# Patient Record
Sex: FEMALE | HISPANIC OR LATINO | Employment: UNEMPLOYED | URBAN - METROPOLITAN AREA
[De-identification: names, ages, dates, MRNs, and addresses within clinical notes are randomized per-mention and may not be internally consistent; named-entity substitution may affect disease eponyms.]

---

## 2019-06-28 ENCOUNTER — HOSPITAL ENCOUNTER (OUTPATIENT)
Facility: MEDICAL CENTER | Age: 44
End: 2019-06-29
Attending: EMERGENCY MEDICINE | Admitting: INTERNAL MEDICINE
Payer: COMMERCIAL

## 2019-06-28 ENCOUNTER — APPOINTMENT (OUTPATIENT)
Dept: RADIOLOGY | Facility: MEDICAL CENTER | Age: 44
End: 2019-06-28
Attending: EMERGENCY MEDICINE
Payer: COMMERCIAL

## 2019-06-28 DIAGNOSIS — D75.1 SECONDARY POLYCYTHEMIA: ICD-10-CM

## 2019-06-28 DIAGNOSIS — J96.01 ACUTE RESPIRATORY FAILURE WITH HYPOXIA (HCC): ICD-10-CM

## 2019-06-28 DIAGNOSIS — J45.41 MODERATE PERSISTENT ASTHMA WITH ACUTE EXACERBATION: ICD-10-CM

## 2019-06-28 DIAGNOSIS — R09.02 HYPOXIA: ICD-10-CM

## 2019-06-28 DIAGNOSIS — R06.02 SOB (SHORTNESS OF BREATH): ICD-10-CM

## 2019-06-28 PROBLEM — D45 POLYCYTHEMIA VERA (HCC): Status: ACTIVE | Noted: 2019-06-28

## 2019-06-28 LAB
ALBUMIN SERPL BCP-MCNC: 3.7 G/DL (ref 3.2–4.9)
ALBUMIN/GLOB SERPL: 1.2 G/DL
ALP SERPL-CCNC: 72 U/L (ref 30–99)
ALT SERPL-CCNC: 11 U/L (ref 2–50)
ANION GAP SERPL CALC-SCNC: 10 MMOL/L (ref 0–11.9)
ANISOCYTOSIS BLD QL SMEAR: ABNORMAL
AST SERPL-CCNC: 21 U/L (ref 12–45)
BASOPHILS # BLD AUTO: 1 % (ref 0–1.8)
BASOPHILS # BLD: 0.09 K/UL (ref 0–0.12)
BILIRUB SERPL-MCNC: 1 MG/DL (ref 0.1–1.5)
BNP SERPL-MCNC: 9 PG/ML (ref 0–100)
BUN SERPL-MCNC: 16 MG/DL (ref 8–22)
CALCIUM SERPL-MCNC: 9.3 MG/DL (ref 8.5–10.5)
CHLORIDE SERPL-SCNC: 97 MMOL/L (ref 96–112)
CO2 SERPL-SCNC: 31 MMOL/L (ref 20–33)
COMMENT 1642: NORMAL
CREAT SERPL-MCNC: 0.64 MG/DL (ref 0.5–1.4)
EKG IMPRESSION: NORMAL
EOSINOPHIL # BLD AUTO: 0.17 K/UL (ref 0–0.51)
EOSINOPHIL NFR BLD: 1.9 % (ref 0–6.9)
ERYTHROCYTE [DISTWIDTH] IN BLOOD BY AUTOMATED COUNT: 64.8 FL (ref 35.9–50)
GLOBULIN SER CALC-MCNC: 3.2 G/DL (ref 1.9–3.5)
GLUCOSE SERPL-MCNC: 126 MG/DL (ref 65–99)
HCT VFR BLD AUTO: 69.6 % (ref 37–47)
HGB BLD-MCNC: 19.6 G/DL (ref 12–16)
IMM GRANULOCYTES # BLD AUTO: 0.01 K/UL (ref 0–0.11)
IMM GRANULOCYTES NFR BLD AUTO: 0.1 % (ref 0–0.9)
LYMPHOCYTES # BLD AUTO: 1.85 K/UL (ref 1–4.8)
LYMPHOCYTES NFR BLD: 20.6 % (ref 22–41)
MACROCYTES BLD QL SMEAR: ABNORMAL
MCH RBC QN AUTO: 25.1 PG (ref 27–33)
MCHC RBC AUTO-ENTMCNC: 29.7 G/DL (ref 33.6–35)
MCV RBC AUTO: 85.7 FL (ref 81.4–97.8)
MICROCYTES BLD QL SMEAR: ABNORMAL
MONOCYTES # BLD AUTO: 0.75 K/UL (ref 0–0.85)
MONOCYTES NFR BLD AUTO: 8.4 % (ref 0–13.4)
MORPHOLOGY BLD-IMP: NORMAL
NEUTROPHILS # BLD AUTO: 6.1 K/UL (ref 2–7.15)
NEUTROPHILS NFR BLD: 68 % (ref 44–72)
NRBC # BLD AUTO: 0 K/UL
NRBC BLD-RTO: 0 /100 WBC
PLATELET # BLD AUTO: 258 K/UL (ref 164–446)
PLATELET BLD QL SMEAR: NORMAL
PMV BLD AUTO: 10.3 FL (ref 9–12.9)
POTASSIUM SERPL-SCNC: 4.4 MMOL/L (ref 3.6–5.5)
PROT SERPL-MCNC: 6.9 G/DL (ref 6–8.2)
RBC # BLD AUTO: 8.09 M/UL (ref 4.2–5.4)
RBC BLD AUTO: PRESENT
SODIUM SERPL-SCNC: 138 MMOL/L (ref 135–145)
TROPONIN I SERPL-MCNC: <0.01 NG/ML (ref 0–0.04)
WBC # BLD AUTO: 9 K/UL (ref 4.8–10.8)

## 2019-06-28 PROCEDURE — 700101 HCHG RX REV CODE 250: Performed by: EMERGENCY MEDICINE

## 2019-06-28 PROCEDURE — G0378 HOSPITAL OBSERVATION PER HR: HCPCS

## 2019-06-28 PROCEDURE — 80053 COMPREHEN METABOLIC PANEL: CPT

## 2019-06-28 PROCEDURE — 94640 AIRWAY INHALATION TREATMENT: CPT

## 2019-06-28 PROCEDURE — 83880 ASSAY OF NATRIURETIC PEPTIDE: CPT

## 2019-06-28 PROCEDURE — 93005 ELECTROCARDIOGRAM TRACING: CPT | Performed by: EMERGENCY MEDICINE

## 2019-06-28 PROCEDURE — 84484 ASSAY OF TROPONIN QUANT: CPT

## 2019-06-28 PROCEDURE — 99285 EMERGENCY DEPT VISIT HI MDM: CPT

## 2019-06-28 PROCEDURE — 99220 PR INITIAL OBSERVATION CARE,LEVL III: CPT | Performed by: INTERNAL MEDICINE

## 2019-06-28 PROCEDURE — 304561 HCHG STAT O2

## 2019-06-28 PROCEDURE — 85379 FIBRIN DEGRADATION QUANT: CPT

## 2019-06-28 PROCEDURE — 71045 X-RAY EXAM CHEST 1 VIEW: CPT

## 2019-06-28 PROCEDURE — 94760 N-INVAS EAR/PLS OXIMETRY 1: CPT

## 2019-06-28 PROCEDURE — 85025 COMPLETE CBC W/AUTO DIFF WBC: CPT

## 2019-06-28 RX ORDER — FUROSEMIDE 20 MG/1
20 TABLET ORAL DAILY
COMMUNITY
End: 2019-06-29

## 2019-06-28 RX ORDER — POLYETHYLENE GLYCOL 3350 17 G/17G
1 POWDER, FOR SOLUTION ORAL
Status: DISCONTINUED | OUTPATIENT
Start: 2019-06-28 | End: 2019-06-29 | Stop reason: HOSPADM

## 2019-06-28 RX ORDER — ALBUTEROL SULFATE 90 UG/1
2 AEROSOL, METERED RESPIRATORY (INHALATION) EVERY 6 HOURS PRN
Status: DISCONTINUED | OUTPATIENT
Start: 2019-06-28 | End: 2019-06-29 | Stop reason: HOSPADM

## 2019-06-28 RX ORDER — FELODIPINE 10 MG/1
10 TABLET, EXTENDED RELEASE ORAL
Status: DISCONTINUED | OUTPATIENT
Start: 2019-06-29 | End: 2019-06-29 | Stop reason: HOSPADM

## 2019-06-28 RX ORDER — BISACODYL 10 MG
10 SUPPOSITORY, RECTAL RECTAL
Status: DISCONTINUED | OUTPATIENT
Start: 2019-06-28 | End: 2019-06-29 | Stop reason: HOSPADM

## 2019-06-28 RX ORDER — ONDANSETRON 2 MG/ML
4 INJECTION INTRAMUSCULAR; INTRAVENOUS EVERY 4 HOURS PRN
Status: DISCONTINUED | OUTPATIENT
Start: 2019-06-28 | End: 2019-06-29 | Stop reason: HOSPADM

## 2019-06-28 RX ORDER — METOPROLOL SUCCINATE 50 MG/1
100 TABLET, EXTENDED RELEASE ORAL
Status: DISCONTINUED | OUTPATIENT
Start: 2019-06-29 | End: 2019-06-29 | Stop reason: HOSPADM

## 2019-06-28 RX ORDER — ONDANSETRON 4 MG/1
4 TABLET, ORALLY DISINTEGRATING ORAL EVERY 4 HOURS PRN
Status: DISCONTINUED | OUTPATIENT
Start: 2019-06-28 | End: 2019-06-29 | Stop reason: HOSPADM

## 2019-06-28 RX ORDER — ACETAMINOPHEN 325 MG/1
650 TABLET ORAL EVERY 6 HOURS PRN
Status: DISCONTINUED | OUTPATIENT
Start: 2019-06-28 | End: 2019-06-29 | Stop reason: HOSPADM

## 2019-06-28 RX ORDER — POTASSIUM CHLORIDE 1.5 G/1.58G
20 POWDER, FOR SOLUTION ORAL 2 TIMES DAILY
COMMUNITY
End: 2019-06-29

## 2019-06-28 RX ORDER — AMOXICILLIN 250 MG
2 CAPSULE ORAL 2 TIMES DAILY
Status: DISCONTINUED | OUTPATIENT
Start: 2019-06-29 | End: 2019-06-29 | Stop reason: HOSPADM

## 2019-06-28 RX ORDER — LISINOPRIL 20 MG/1
20 TABLET ORAL
Status: DISCONTINUED | OUTPATIENT
Start: 2019-06-29 | End: 2019-06-29 | Stop reason: HOSPADM

## 2019-06-28 RX ADMIN — ALBUTEROL SULFATE 2.5 MG: 2.5 SOLUTION RESPIRATORY (INHALATION) at 22:31

## 2019-06-28 ASSESSMENT — COPD QUESTIONNAIRES
HAVE YOU SMOKED AT LEAST 100 CIGARETTES IN YOUR ENTIRE LIFE: YES
COPD SCREENING SCORE: 6
DURING THE PAST 4 WEEKS HOW MUCH DID YOU FEEL SHORT OF BREATH: MOST  OR ALL OF THE TIME
DO YOU EVER COUGH UP ANY MUCUS OR PHLEGM?: YES, A FEW DAYS A WEEK OR MONTH

## 2019-06-28 ASSESSMENT — LIFESTYLE VARIABLES: EVER_SMOKED: YES

## 2019-06-29 ENCOUNTER — PATIENT OUTREACH (OUTPATIENT)
Dept: HEALTH INFORMATION MANAGEMENT | Facility: OTHER | Age: 44
End: 2019-06-29

## 2019-06-29 ENCOUNTER — APPOINTMENT (OUTPATIENT)
Dept: CARDIOLOGY | Facility: MEDICAL CENTER | Age: 44
End: 2019-06-29
Attending: INTERNAL MEDICINE
Payer: COMMERCIAL

## 2019-06-29 VITALS
WEIGHT: 292.99 LBS | RESPIRATION RATE: 20 BRPM | HEART RATE: 75 BPM | HEIGHT: 65 IN | SYSTOLIC BLOOD PRESSURE: 144 MMHG | OXYGEN SATURATION: 98 % | BODY MASS INDEX: 48.82 KG/M2 | TEMPERATURE: 96.5 F | DIASTOLIC BLOOD PRESSURE: 96 MMHG

## 2019-06-29 PROBLEM — J45.41 MODERATE PERSISTENT ASTHMA WITH ACUTE EXACERBATION: Status: ACTIVE | Noted: 2019-06-29

## 2019-06-29 PROBLEM — I10 ESSENTIAL HYPERTENSION: Status: ACTIVE | Noted: 2019-06-29

## 2019-06-29 PROBLEM — E66.9 OBESITY: Status: ACTIVE | Noted: 2019-06-29

## 2019-06-29 LAB
ANION GAP SERPL CALC-SCNC: 17 MMOL/L (ref 0–11.9)
BASOPHILS # BLD AUTO: 1.1 % (ref 0–1.8)
BASOPHILS # BLD: 0.07 K/UL (ref 0–0.12)
BUN SERPL-MCNC: 14 MG/DL (ref 8–22)
CALCIUM SERPL-MCNC: 9.2 MG/DL (ref 8.5–10.5)
CHLORIDE SERPL-SCNC: 97 MMOL/L (ref 96–112)
CO2 SERPL-SCNC: 25 MMOL/L (ref 20–33)
CREAT SERPL-MCNC: 0.6 MG/DL (ref 0.5–1.4)
D DIMER PPP IA.FEU-MCNC: <0.4 UG/ML (FEU) (ref 0–0.5)
EOSINOPHIL # BLD AUTO: 0.14 K/UL (ref 0–0.51)
EOSINOPHIL NFR BLD: 2.1 % (ref 0–6.9)
ERYTHROCYTE [DISTWIDTH] IN BLOOD BY AUTOMATED COUNT: 68.3 FL (ref 35.9–50)
GLUCOSE SERPL-MCNC: 100 MG/DL (ref 65–99)
HCT VFR BLD AUTO: 70.1 % (ref 37–47)
HGB BLD-MCNC: 20.2 G/DL (ref 12–16)
IMM GRANULOCYTES # BLD AUTO: 0.01 K/UL (ref 0–0.11)
IMM GRANULOCYTES NFR BLD AUTO: 0.2 % (ref 0–0.9)
LV EJECT FRACT  99904: 65
LV EJECT FRACT MOD 2C 99903: 66.82
LV EJECT FRACT MOD 4C 99902: 67.8
LV EJECT FRACT MOD BP 99901: 66.49
LYMPHOCYTES # BLD AUTO: 1.42 K/UL (ref 1–4.8)
LYMPHOCYTES NFR BLD: 21.4 % (ref 22–41)
MCH RBC QN AUTO: 25 PG (ref 27–33)
MCHC RBC AUTO-ENTMCNC: 28.8 G/DL (ref 33.6–35)
MCV RBC AUTO: 86.8 FL (ref 81.4–97.8)
MONOCYTES # BLD AUTO: 0.58 K/UL (ref 0–0.85)
MONOCYTES NFR BLD AUTO: 8.7 % (ref 0–13.4)
NEUTROPHILS # BLD AUTO: 4.42 K/UL (ref 2–7.15)
NEUTROPHILS NFR BLD: 66.5 % (ref 44–72)
NRBC # BLD AUTO: 0 K/UL
NRBC BLD-RTO: 0 /100 WBC
PLATELET # BLD AUTO: 230 K/UL (ref 164–446)
PMV BLD AUTO: 9.6 FL (ref 9–12.9)
POTASSIUM SERPL-SCNC: 4.2 MMOL/L (ref 3.6–5.5)
PROCALCITONIN SERPL-MCNC: <0.05 NG/ML
RBC # BLD AUTO: 8.08 M/UL (ref 4.2–5.4)
SODIUM SERPL-SCNC: 139 MMOL/L (ref 135–145)
WBC # BLD AUTO: 6.6 K/UL (ref 4.8–10.8)

## 2019-06-29 PROCEDURE — 84145 PROCALCITONIN (PCT): CPT

## 2019-06-29 PROCEDURE — 93306 TTE W/DOPPLER COMPLETE: CPT | Mod: 26 | Performed by: INTERNAL MEDICINE

## 2019-06-29 PROCEDURE — G0378 HOSPITAL OBSERVATION PER HR: HCPCS

## 2019-06-29 PROCEDURE — 700102 HCHG RX REV CODE 250 W/ 637 OVERRIDE(OP): Performed by: INTERNAL MEDICINE

## 2019-06-29 PROCEDURE — 80048 BASIC METABOLIC PNL TOTAL CA: CPT

## 2019-06-29 PROCEDURE — 36415 COLL VENOUS BLD VENIPUNCTURE: CPT

## 2019-06-29 PROCEDURE — A9270 NON-COVERED ITEM OR SERVICE: HCPCS | Performed by: INTERNAL MEDICINE

## 2019-06-29 PROCEDURE — 99217 PR OBSERVATION CARE DISCHARGE: CPT | Performed by: INTERNAL MEDICINE

## 2019-06-29 PROCEDURE — 93306 TTE W/DOPPLER COMPLETE: CPT

## 2019-06-29 PROCEDURE — 85025 COMPLETE CBC W/AUTO DIFF WBC: CPT

## 2019-06-29 RX ORDER — LISINOPRIL 20 MG/1
20 TABLET ORAL 2 TIMES DAILY
COMMUNITY
End: 2021-10-30

## 2019-06-29 RX ORDER — HYDROCHLOROTHIAZIDE 25 MG/1
25 TABLET ORAL DAILY
COMMUNITY
End: 2021-10-30

## 2019-06-29 RX ORDER — FELODIPINE 5 MG/1
5 TABLET, EXTENDED RELEASE ORAL DAILY
COMMUNITY
End: 2021-06-16

## 2019-06-29 RX ORDER — POTASSIUM CHLORIDE 20 MEQ/1
20 TABLET, EXTENDED RELEASE ORAL DAILY
COMMUNITY
End: 2021-10-31

## 2019-06-29 RX ORDER — ACETAMINOPHEN 325 MG/1
650 TABLET ORAL EVERY 6 HOURS PRN
Qty: 30 TAB | Refills: 0 | Status: SHIPPED | OUTPATIENT
Start: 2019-06-29 | End: 2021-10-30

## 2019-06-29 RX ORDER — IPRATROPIUM BROMIDE AND ALBUTEROL SULFATE 2.5; .5 MG/3ML; MG/3ML
3 SOLUTION RESPIRATORY (INHALATION) EVERY 4 HOURS PRN
Qty: 30 BULLET | Refills: 0 | Status: SHIPPED | OUTPATIENT
Start: 2019-06-29 | End: 2021-10-30

## 2019-06-29 RX ORDER — ALBUTEROL SULFATE 90 UG/1
2 AEROSOL, METERED RESPIRATORY (INHALATION) EVERY 6 HOURS PRN
Qty: 8.5 G | Refills: 1 | Status: SHIPPED | OUTPATIENT
Start: 2019-06-29 | End: 2023-01-11 | Stop reason: SDUPTHER

## 2019-06-29 RX ORDER — BUDESONIDE AND FORMOTEROL FUMARATE DIHYDRATE 80; 4.5 UG/1; UG/1
2 AEROSOL RESPIRATORY (INHALATION) 2 TIMES DAILY
Qty: 1 EACH | Refills: 2 | Status: SHIPPED | OUTPATIENT
Start: 2019-06-29 | End: 2021-06-16

## 2019-06-29 RX ADMIN — LISINOPRIL 20 MG: 20 TABLET ORAL at 04:49

## 2019-06-29 RX ADMIN — FELODIPINE 10 MG: 10 TABLET, EXTENDED RELEASE ORAL at 04:50

## 2019-06-29 RX ADMIN — ASPIRIN 81 MG: 81 TABLET, DELAYED RELEASE ORAL at 04:47

## 2019-06-29 RX ADMIN — SENNOSIDES AND DOCUSATE SODIUM 2 TABLET: 8.6; 5 TABLET ORAL at 04:49

## 2019-06-29 RX ADMIN — METOPROLOL SUCCINATE 100 MG: 50 TABLET, EXTENDED RELEASE ORAL at 01:41

## 2019-06-29 ASSESSMENT — COGNITIVE AND FUNCTIONAL STATUS - GENERAL
TURNING FROM BACK TO SIDE WHILE IN FLAT BAD: A LITTLE
DAILY ACTIVITIY SCORE: 24
SUGGESTED CMS G CODE MODIFIER DAILY ACTIVITY: CH
MOBILITY SCORE: 23
SUGGESTED CMS G CODE MODIFIER MOBILITY: CI

## 2019-06-29 ASSESSMENT — PATIENT HEALTH QUESTIONNAIRE - PHQ9
SUM OF ALL RESPONSES TO PHQ9 QUESTIONS 1 AND 2: 0
1. LITTLE INTEREST OR PLEASURE IN DOING THINGS: NOT AT ALL
2. FEELING DOWN, DEPRESSED, IRRITABLE, OR HOPELESS: NOT AT ALL

## 2019-06-29 ASSESSMENT — ENCOUNTER SYMPTOMS
NAUSEA: 0
MYALGIAS: 0
BLURRED VISION: 0
FOCAL WEAKNESS: 0
DIAPHORESIS: 0
FEVER: 0
DIARRHEA: 0
ABDOMINAL PAIN: 0
COUGH: 0
HEADACHES: 0
BLOOD IN STOOL: 0
PALPITATIONS: 0
WHEEZING: 0
CHILLS: 0
SPUTUM PRODUCTION: 0
NECK PAIN: 0
SHORTNESS OF BREATH: 1
DIZZINESS: 0
FLANK PAIN: 0
BACK PAIN: 0
SEIZURES: 0
BRUISES/BLEEDS EASILY: 0
VOMITING: 0
SORE THROAT: 0

## 2019-06-29 ASSESSMENT — LIFESTYLE VARIABLES
EVER_SMOKED: NEVER
ALCOHOL_USE: NO
EVER_SMOKED: NEVER

## 2019-06-29 NOTE — DISCHARGE PLANNING
Hospital Care Management Discharge Planning       Anticipated Discharge Disposition:   · Home w/ O2 DME resumption and update.     Action:   · This RN CM met with patient at bedside.   · Patient gets her NOC O2 through [place].   · This RN CM requested ELVIA Arriola to send updated DME order to [facility] and request bedside delivery for today.      Barriers to Discharge:   · DME delivery.     Plan:   · F/U with [facility] and treatment team.    · Continue to provide support services and assistance with discharge planning as needed.

## 2019-06-29 NOTE — DISCHARGE SUMMARY
Discharge Summary    CHIEF COMPLAINT ON ADMISSION  Chief Complaint   Patient presents with   • Sent by MD   • Abnormal Labs       Reason for Admission  Shortness of breath    Admission Date  6/28/2019    CODE STATUS  Full Code    HPI & HOSPITAL COURSE  This is a 43 y.o. female here with complaint of shortness of breath and noticed to have significant polycythemia.  Patient was also noticed to have COPD exacerbation.  Patient was treated with breathing treatment and bronchodilator.  Patient underlying asthma probably is the main reason for COPD.  Patient currently tolerated treatment of breathing condition significantly breathing treatment and bronchodilator.  Patient is recommended to be discharged home with use of oxygen to treat chronic hypoxemia and use of inhaled steroid for COPD.  Recommend patient follow-up with outpatient for polycythemia likely due to chronic hypoxia.    During patient's hospital stay patient's BMP was negative and patient d-dimer was negative.  No signs of PE.         Therefore, she is discharged in fair and stable condition to home with close outpatient follow-up.    The patient recovered much more quickly than anticipated on admission.    Discharge Date  6/29/2019    FOLLOW UP ITEMS POST DISCHARGE  none    DISCHARGE DIAGNOSES      Polycythemia vera (HCC) POA: Yes    Acute respiratory failure with hypoxia (HCC) POA: Yes    Obesity POA: Yes    Essential hypertension POA: Yes    Moderate persistent asthma with acute exacerbation POA: Yes    FOLLOW UP  No future appointments.  Deniz Murcia M.D.  [address] [facility] [address]  [phone]    In 1 week        MEDICATIONS ON DISCHARGE     Medication List      START taking these medications      Instructions   acetaminophen 325 MG Tabs  Commonly known as:  TYLENOL   Take 2 Tabs by mouth every 6 hours as needed (Mild Pain; (Pain scale 1-3); Temp greater than 100.5 F).  Dose:  650 mg     budesonide-formoterol 80-4.5 MCG/ACT Aero  Commonly  known as:  SYMBICORT   Inhale 2 Puffs by mouth 2 Times a Day.  Dose:  2 Puff     ipratropium-albuterol 0.5-2.5 (3) MG/3ML nebulizer solution  Commonly known as:  DUONEB   3 mL by Nebulization route every four hours as needed for Shortness of Breath.  Dose:  3 mL        CONTINUE taking these medications      Instructions   albuterol 108 (90 Base) MCG/ACT Aers inhalation aerosol   Inhale 2 Puffs by mouth every 6 hours as needed for Shortness of Breath.  Dose:  2 Puff     felodipine 5 MG Tb24  Commonly known as:  PLENDIL   Take 5 mg by mouth every day.  Dose:  5 mg     hydroCHLOROthiazide 25 MG Tabs  Commonly known as:  HYDRODIURIL   Take 25 mg by mouth every day.  Dose:  25 mg     lisinopril 20 MG Tabs  Commonly known as:  PRINIVIL   Take 20 mg by mouth 2 Times a Day.  Dose:  20 mg     metoprolol 25 MG Tabs  Commonly known as:  LOPRESSOR   Take 25 mg by mouth 2 times a day.  Dose:  25 mg     OMEGA 3 PO   Take 1 Dose by mouth every day. Unknown OTC Strength  Dose:  1 Dose     potassium chloride SA 20 MEQ Tbcr  Commonly known as:  Kdur   Take 20 mEq by mouth every day.  Dose:  20 mEq            Allergies  No Known Allergies    DIET  Orders Placed This Encounter   Procedures   • Diet Order Cardiac, Diabetic     Standing Status:   Standing     Number of Occurrences:   1     Order Specific Question:   Diet:     Answer:   Cardiac [6]     Order Specific Question:   Diet:     Answer:   Diabetic [3]       ACTIVITY  As tolerated.  Weight bearing as tolerated    CONSULTATIONS  none    PROCEDURES  None    DX-CHEST-PORTABLE (1 VIEW)   Final Result   Addendum 1 of 1   These findings were discussed with TRINITY SABA on 6/28/2019 10:51 PM.      Final      No acute cardiopulmonary disease.      EC-ECHOCARDIOGRAM COMPLETE W/O CONT    (Results Pending)     PE:  Gen: AAOx3, NAD  Eyes: PELLA  Neck: no JVD, no lymphadenopathy  Cardia: RRR, no mrg  Lungs: CTAB, no rales, rhonci or wheezing  Abd: NABS, soft, non extended, no mass  EXT:  Bilateral lower extremity symmetric 1+edema, peripheral pulse 2+ b/l  Neuro: CN II-XII intact, non focal, reflex 2+ symmetrical  Skin: Intact, no lesion, warm  Psych: Appropriate.      LABORATORY  Lab Results   Component Value Date    SODIUM 139 06/29/2019    POTASSIUM 4.2 06/29/2019    CHLORIDE 97 06/29/2019    CO2 25 06/29/2019    GLUCOSE 100 (H) 06/29/2019    BUN 14 06/29/2019    CREATININE 0.60 06/29/2019    CREATININE 0.6 05/28/2006        Lab Results   Component Value Date    WBC 6.6 06/29/2019    HEMOGLOBIN 20.2 (H) 06/29/2019    HEMATOCRIT 70.1 (HH) 06/29/2019    PLATELETCT 230 06/29/2019        Total time of the discharge process exceeds 38 minutes.

## 2019-06-29 NOTE — DISCHARGE PLANNING
Hospital Care Management Discharge Planning       Anticipated Discharge Disposition:   · Home w/ O2 DME resumption and update.     Action:   · This RN BOB requested additional follow-up with [facility] regarding delivery.      Barriers to Discharge:   · DME delivery.     Plan:   · F/U with [facility] and treatment team.    · Continue to provide support services and assistance with discharge planning as needed.

## 2019-06-29 NOTE — ASSESSMENT & PLAN NOTE
Troponin and BNP within normal limits  Check d-dimer, if elevated will order CTA chest with IV contrast to rule out PE  Check 2D echo  Continuous cardiac monitoring  Continue supplemental oxygen with RT protocol

## 2019-06-29 NOTE — ED TRIAGE NOTES
Chief Complaint   Patient presents with   • Sent by MD   • Abnormal Labs   Pt to triage in NAD.  Pt was sent by the [facility] for abnormal labs.  Pt reports they told her that her hormones are all out of wack.  Pt is supposed to wear 2L home O2 but has not gotten portable tank but she states she does have home O2.  Pt educated on triage process and instructed to notify triage RN of any change in status.

## 2019-06-29 NOTE — PROGRESS NOTES
O2 delivered to bedside, pt and  aware of how to follow up for new equipment. D/c instructions reviewed with pt. Pt escorted to family car via WC by RN. All questions answered.

## 2019-06-29 NOTE — CARE PLAN
Problem: Infection  Goal: Will remain free from infection  Outcome: PROGRESSING AS EXPECTED  No s/sx infection    Problem: Knowledge Deficit  Goal: Knowledge of disease process/condition, treatment plan, diagnostic tests, and medications will improve  Outcome: PROGRESSING SLOWER THAN EXPECTED  Pt states she has questions about diagnosis, want to talk with Md today. Will let them know

## 2019-06-29 NOTE — RESPIRATORY CARE
COPD EDUCATION by COPD CLINICAL EDUCATOR  6/29/2019 at 7:12 AM by Domonique Fajardo     Patient reviewed by COPD education team. Patient does not have a history or diagnosis of COPD and is a non-smoker, therefore does not qualify for the COPD program.

## 2019-06-29 NOTE — PROGRESS NOTES
Critical lab result called hct 70.1, read back and confirmed. Dr Contreras and bedside and made aware. No further interventions at this time.

## 2019-06-29 NOTE — PROGRESS NOTES
Lab called RN to clarify therapeutic phlebotomy that was ordered overnight but was not done. I had let Dr Contreras know when he was talking about discharge and he said it was fine it was not done. Cancelled with lab.

## 2019-06-29 NOTE — PROGRESS NOTES
Patient transfer from [place] [address] ED to 816.  Patient accompanied with significant other.  Patient on 3L via NC.  Denies pain.  No distress noted at this time.  Patient placed on monitor. SR.  Patient and significant other informed and educated of unit and floor protocol.  Will continue to monitor.

## 2019-06-29 NOTE — PROGRESS NOTES
RN acknowledges discharge order and echo complete. Working with CM to get home O2 delivered to bedside before d/c.

## 2019-06-29 NOTE — ASSESSMENT & PLAN NOTE
Body mass index is 48.76 kg/m².   Recommended outpatient weight management program and bariatric surgery evaluation

## 2019-06-29 NOTE — ED NOTES
"Pt here because of MD called her to come to the ED about lab results. Pt states \" I feel fine.\" SPO2 found to be 80%. Pt states she is supposed to be on O2 but was not wearing it at this time. SPO2 came up with 3L O2 via nasal cannula.   "

## 2019-06-29 NOTE — DISCHARGE PLANNING
Hospital Care Management Discharge Planning       Anticipated Discharge Disposition:   · Home w/ O2 DME resumption and update     Action:   · This RN CM placed STAT page out to [place]  and escalated the lack of communication to their leadership.      Barriers to Discharge:   · DME delivery.     Plan:   · Await call back.   · F/U with [facility] and treatment team.    · Continue to provide support services and assistance with discharge planning as needed.

## 2019-06-29 NOTE — PROGRESS NOTES
2 RN skin admission check with Isabell FORD    Devices in place: Nasal cannula and telebox   Skin assess under devices:  Yes  Bilateral ears: red/blanchable  Neck: skin tags with dark discoloration  Bilateral elbows: dry/blanchable  Skin folds (under breast and abdomen): discoloration  Sacral area: no pressure ulcers and blanchable  Heels: dry, callus, and slow to joseph    Patient informed and educated importance of pressure ulcers and repositioning.

## 2019-06-29 NOTE — ED PROVIDER NOTES
ED Provider Note    HPI: Patient is a 43-year-old female who presented to the emergency department June 28, 2019 at 6:52 PM with a chief complaint of shortness of breath.    The patient is an extremely poor and tangential historian.  the patient was recently started on O2 at home but states the oxygen has been delivered yet.  She was noted to be markedly hypoxic at triage with a pulse oximetry of 80%.  She states that her doctor told her her hormones were all out of whack but really could not give me any further helpful history in terms of this problem.  She said no chest pain no fever no chills no nausea no vomiting no abdominal pain.  No change in bladder bowel habits.  No other somatic complaint    Review of Systems: Positive shortness of breath possible laboratory abnormalities negative for chest pain fever chills nausea vomiting abdominal pain change in bladder or bowel habits.  Review of systems reviewed with patient, all other systems    Past medical/surgical history: Asthma/COPD elevated BMI hypertension    Medications: Home O2 Lasix potassium chloride metoprolol lisinopril hydrochlorothiazide    Allergies: None    Social History: No history of smoking no alcohol use      Physical exam: Constitutional: Pleasant female awake alert appears short of breath  Vital signs: Temperature 97.0 pulse 100 respirations 18 blood pressure 168/99 pulse oximetry 80% on room air 92% on 3 L  EYES: PERRL, EOMI, Conjunctivae and sclera normal, eyelids normal bilaterally.  Neck: Trachea midline. No cervical masses seen or palpated. Normal range of motion, supple. No meningeal signs elicited.  Cardiac: Regular rate and rhythm. S1-S2 present. No S3 or S4 present. No murmurs, rubs, or gallops heard. No edema or varicosities were seen.   Lungs: Coarse breath sounds diffusely aeration fair at best.  No wheezes, rales, or rhonchi heard. Patient's chest wall moved symmetrically with each respiratory effort. Patient was not making use of  accessory muscles of respiration in breathing.  Abdomen: Soft nontender to palpation. No rebound or guarding elicited. No organomegaly identified. No pulsatile abdominal masses identified.   Musculoskeletal:  no  pain with palpitation or movement of muscle, bone or joint , no obvious musculoskeletal deformities identified.  Neurologic: alert and awake answers questions appropriately. Moves all four extremities independently, no gross focal abnormalities identified. Normal strength and motor.  Skin: no rash or lesion seen, no palpable dermatologic lesions identified.  Psychiatric: not anxious, delusional, or hallucinating.    Medical decision making: EKG obtained (interpretation) twelve-lead EKG Rate 73.  Morphology P waves unremarkable.  Patient appears to have left posterior fascicular block configuration of her QRS complex.  No evidence of ST elevation or depression.  R wave progression appears to show late transition.  There is no evidence of ST elevation or depression.  Interpreted as an abnormal EKG but not indicating acute ischemia or dysrhythmia    Chest x-ray obtained; no acute abnormalities were seen    Laboratory studies obtained (please see lab sheet for all results) significant findings include significant polycythemia with 8.09 red blood cells and hemoglobin of 19.6 hematocrit of 69.6.  Platelet count and white count normal.  Chemistry panel unremarkable except minimally elevated blood sugar 126.  Troponin and BNP were normal    Patient admitted by hospitalist service.  She appears to have polycythemia which I suspect is secondary given her history of fairly significant COPD.  Phlebotomy may be helpful alternatively pharmaceutical treatment may be undertaken.  The patient is fairly hypoxic.  Further care and hospital course per attending physician summary    Impression secondary polycythemia  2) shortness of breath  3.)  Hypoxia

## 2019-06-29 NOTE — FACE TO FACE
"Face to Face Note  -  Durable Medical Equipment    Daisy Contreras M.D. - NPI: [id number]  I certify that this patient is under my care and that they had a durable medical equipment(DME)face to face encounter by myself that meets the physician DME face-to-face encounter requirements with this patient on:    Date of encounter:   Patient:                    MRN:                       YOB: 2020  Adri Maldonado  [id number]  1975     The encounter with the patient was in whole, or in part, for the following medical condition, which is the primary reason for durable medical equipment:  Asthma    I certify that, based on my findings, the following durable medical equipment is medically necessary:  Oxygen.    HOME O2 Saturation Measurements:(Values must be present for Home Oxygen orders)  Room air sat at rest: 88  Room air sat with amb: 77  With liters of O2: 3, O2 sat at rest with O2: 95  With Liters of O2: 3, O2 sat with amb with O2 : 93  Is the patient mobile?: Yes    My Clinical findings support the need for the above equipment due to:  Hypoxia    Supporting Symptoms: The patient requires supplemental oxygen, as the following interventions have been tried with limited or no improvement: \"Bronchodilators and/or steroid inhalers    "

## 2019-06-29 NOTE — DISCHARGE INSTRUCTIONS
Discharge Instructions    Discharged to home by car with relative. Discharged via wheelchair, hospital escort: Yes.  Special equipment needed: Oxygen    Be sure to schedule a follow-up appointment with your primary care doctor or any specialists as instructed.     Discharge Plan:   Diet Plan: Discussed  Activity Level: Discussed  Confirmed Follow up Appointment: Patient to Call and Schedule Appointment  Confirmed Symptoms Management: Discussed  Medication Reconciliation Updated: Yes  Influenza Vaccine Indication: Not indicated: Previously immunized this influenza season and > 8 years of age    I understand that a diet low in cholesterol, fat, and sodium is recommended for good health. Unless I have been given specific instructions below for another diet, I accept this instruction as my diet prescription.   Other diet: Regular    Special Instructions: None    · Is patient discharged on Warfarin / Coumadin?   No     Depression / Suicide Risk    As [facility], it is important to learn how to keep safe from harming yourself.    Recognize the warning signs:  · Abrupt changes in personality, positive or negative- including increase in energy   · Giving away possessions  · Change in eating patterns- significant weight changes-  positive or negative  · Change in sleeping patterns- unable to sleep or sleeping all the time   · Unwillingness or inability to communicate  · Depression  · Unusual sadness, discouragement and loneliness  · Talk of wanting to die  · Neglect of personal appearance   · Rebelliousness- reckless behavior  · Withdrawal from people/activities they love  · Confusion- inability to concentrate     If you or a loved one observes any of these behaviors or has concerns about self-harm, here's what you can do:  · Talk about it- your feelings and reasons for harming yourself  · Remove any means that you might use to hurt yourself (examples: pills, rope, extension cords,  firearm)  · Get professional help from the community (Mental Health, Substance Abuse, psychological counseling)  · Do not be alone:Call your Safe Contact- someone whom you trust who will be there for you.  · Call your local CRISIS HOTLINE 421-9093 or 943-285-5452  · Call your local Children's Mobile Crisis Response Team [place] (418) 774-9137 or www.ShowNearby  · Call the toll free National Suicide Prevention Hotlines   · National Suicide Prevention Lifeline 073-866-OENL (9474)  · National Hope Line Network 800-SUICIDE (938-1270)

## 2019-06-29 NOTE — H&P
Hospital Medicine History & Physical Note    Date of Service  6/28/2019    Primary Care Physician  Deniz Murcia M.D.    Consultants  None    Code Status  Full code    Chief Complaint  Shortness of breath    History of Presenting Illness  43 y.o. female with a past medical history of morbid obesity, obstructive sleep apnea on nocturnal oxygen, asthma, who presented 6/28/2019 with shortness of breath and abnormal labs.  Patient underwent blood work and was noted to have polycythemia with a hemoglobin of 19.6 and a hematocrit of 69.6.  In triage she was noted to be hypoxic at 80% and was started on 2 L of oxygen continuous whereas she had previously been on 2 L of oxygen only at night.  She reports a mild headache.  She denies any fevers, chills, chest pain, vision changes, focal weakness, numbness or tingling.    EKG interpreted by me reveals sinus rhythm with left posterior fascicular block and biatrial enlargement.  No ST elevation or ST depression noted  Chest x-ray interpreted by me reveals no acute cardiopulmonary process      Review of Systems  Review of Systems   Constitutional: Negative for chills, diaphoresis and fever.   HENT: Negative for hearing loss and sore throat.    Eyes: Negative for blurred vision.   Respiratory: Positive for shortness of breath. Negative for cough, sputum production and wheezing.    Cardiovascular: Negative for chest pain, palpitations and leg swelling.   Gastrointestinal: Negative for abdominal pain, blood in stool, diarrhea, nausea and vomiting.   Genitourinary: Negative for dysuria, flank pain and urgency.   Musculoskeletal: Negative for back pain, joint pain, myalgias and neck pain.   Skin: Negative for rash.   Neurological: Negative for dizziness, focal weakness, seizures and headaches.   Endo/Heme/Allergies: Does not bruise/bleed easily.   Psychiatric/Behavioral: Negative for suicidal ideas.   All other systems reviewed and are negative.      Past Medical History   has a  past medical history of ASTHMA.    Surgical History  No pertinent surgical history    Family History  No pertinent family history    Social History  Ex-smoker with a 9-pack-year smoking history. She does not have any smokeless tobacco history on file. She reports that she does not drink alcohol or use drugs.    Allergies  No Known Allergies    Medications  Prior to Admission Medications   Prescriptions Last Dose Informant Patient Reported? Taking?   ALBUTEROL INH   Yes No   Sig: Inhale  by mouth. PRN   BACITRACIN 500 UNIT/GM OP OINT   No No   Sig: Place  in right eye 4 times a day. Apply qid to R eye in place of polytrim drops if no response to polytrim drops   GuaiFENesin (MUCINEX MAXIMUM STRENGTH) 1200 MG TB12   No No   Sig: Take 1 Tab by mouth 2 times a day.   Pseudoephedrine HCl (SUDAFED 12 HOUR PO)   Yes No   Sig: Take  by mouth.   albuterol (ACCUNEB) 0.63 MG/3ML nebulizer solution   No No   Sig: 3 mL by Nebulization route every four hours as needed for Shortness of Breath. Dispense # 1 box   albuterol (VENTOLIN OR PROVENTIL) 108 (90 BASE) MCG/ACT AERS   No No   Sig: Inhale 2 Puffs by mouth every 6 hours as needed for Shortness of Breath.   albuterol (VENTOLIN OR PROVENTIL) 108 (90 BASE) MCG/ACT AERS   No No   Sig: Inhale 2 Puffs by mouth every 6 hours as needed for Shortness of Breath.   amoxicillin-clavulanate (AUGMENTIN) 875-125 MG TABS   No No   Sig: Take 1 Tab by mouth every 12 hours. Take with food.   azithromycin (ZITHROMAX) 250 MG TABS   No No   Sig: Take  by mouth every day. 2 tabs by mouth day 1, 1 tab by mouth days 2-5   chlorpheniramine-hydrocodone (TUSSIONEX) 8-10 MG/5ML suspension   No No   Sig: Take 5 mL by mouth every 12 hours as needed.   fluconazole (DIFLUCAN) 150 MG tablet   No No   Sig: Take 1 Tab by mouth every day.   furosemide (LASIX) 40 MG Tab   Yes Yes   Sig: Take 40 mg by mouth every day.   ipratropium (ATROVENT) 0.02 % SOLN   No No   Si.5 mL by Nebulization route. now    levalbuterol (XOPENEX) 0.63 MG/3ML NEBU   No No   Sig: 3 mL by Nebulization route every four hours as needed for Shortness of Breath for 1 dose.   moxifloxacin (AVELOX) 400 MG TABS   No No   Sig: Take 1 Tab by mouth every day.   phenazopyridine (PYRIDIUM) 200 MG TABS   No No   Sig: Take 1 Tab by mouth 3 times a day as needed.   potassium chloride (KLOR-CON) 20 MEQ Pack   Yes Yes   Sig: Take 20 mEq by mouth 2 times a day.      Facility-Administered Medications: None       Physical Exam  Temp:  [36.1 °C (97 °F)] 36.1 °C (97 °F)  Pulse:  [] 86  Resp:  [18-20] 20  BP: (158-168)/() 158/108  SpO2:  [92 %-98 %] 95 %    Physical Exam   Constitutional: She is oriented to person, place, and time. She appears well-developed and well-nourished. No distress.   Obese   HENT:   Head: Normocephalic and atraumatic.   Mouth/Throat: Oropharynx is clear and moist.   Eyes: Pupils are equal, round, and reactive to light. Conjunctivae are normal. Right eye exhibits no discharge. Left eye exhibits no discharge. No scleral icterus.   Neck: Normal range of motion. Neck supple. No tracheal deviation present.   Cardiovascular: Normal rate, regular rhythm and normal heart sounds.    Pulmonary/Chest: Effort normal and breath sounds normal. No stridor. No respiratory distress. She has no wheezes. She has no rales.   Abdominal: Soft. Bowel sounds are normal. She exhibits no distension. There is no tenderness. There is no rebound and no guarding.   Musculoskeletal: Normal range of motion. She exhibits edema (+1 edema bilaterally in lower extremity). She exhibits no tenderness or deformity.   Lymphadenopathy:     She has no cervical adenopathy.   Neurological: She is alert and oriented to person, place, and time. No cranial nerve deficit. Coordination normal.   Skin: Skin is warm and dry. No rash noted. She is not diaphoretic. No erythema.   Psychiatric: She has a normal mood and affect. Her behavior is normal.   Nursing note and  vitals reviewed.      Laboratory:  Recent Labs      06/28/19   2222   WBC  9.0   RBC  8.09*   HEMOGLOBIN  19.6*   HEMATOCRIT  69.6*   MCV  85.7   MCH  25.1*   MCHC  29.7*   RDW  64.8*   PLATELETCT  258   MPV  10.3     Recent Labs      06/28/19   2222   SODIUM  138   POTASSIUM  4.4   CHLORIDE  97   CO2  31   GLUCOSE  126*   BUN  16   CREATININE  0.64   CALCIUM  9.3     Recent Labs      06/28/19   2222   ALTSGPT  11   ASTSGOT  21   ALKPHOSPHAT  72   TBILIRUBIN  1.0   GLUCOSE  126*         Recent Labs      06/28/19   2222   BNPBTYPENAT  9         Recent Labs      06/28/19   2222   TROPONINI  <0.01       Urinalysis:    No results found     Imaging:  DX-CHEST-PORTABLE (1 VIEW)   Final Result   Addendum 1 of 1   These findings were discussed with TRINITY SABA on 6/28/2019 10:51 PM.      Final      No acute cardiopulmonary disease.            Assessment/Plan:  I anticipate this patient is appropriate for observation status at this time.    Polycythemia vera (HCC)- (present on admission)   Assessment & Plan    Likely secondary polycythemia due to chronic hypoxia secondary to obstructive sleep apnea.  Rule out underlying cardiopulmonary process.  Check 2D echo  Check serum EPO  I have ordered a therapeutic phlebotomy  I started the patient aspirin 81 mg twice daily  Will consider consulting hematology in the morning for further recommendations  Continuous cardiac monitoring  Monitor CBC     Acute respiratory failure with hypoxia (HCC)- (present on admission)   Assessment & Plan    Troponin and BNP within normal limits  Check d-dimer, if elevated will order CTA chest with IV contrast to rule out PE  Check 2D echo  Continuous cardiac monitoring  Continue supplemental oxygen with RT protocol       Essential hypertension- (present on admission)   Assessment & Plan    Uncontrolled  Continue metoprolol, lisinopril and Plendil  IV hydralazine PRN for SBP greater than 160     Obesity- (present on admission)   Assessment & Plan     Body mass index is 48.76 kg/m².   Recommended outpatient weight management program and bariatric surgery evaluation             VTE prophylaxis: heparin

## 2019-06-29 NOTE — ASSESSMENT & PLAN NOTE
Uncontrolled  Continue metoprolol, lisinopril and Plendil  IV hydralazine PRN for SBP greater than 160

## 2019-06-29 NOTE — ASSESSMENT & PLAN NOTE
Likely secondary polycythemia due to chronic hypoxia secondary to obstructive sleep apnea.  Rule out underlying cardiopulmonary process.  Check 2D echo  Check serum EPO  I have ordered a therapeutic phlebotomy  I started the patient aspirin 81 mg twice daily  Will consider consulting hematology in the morning for further recommendations  Continuous cardiac monitoring  Monitor CBC

## 2019-06-29 NOTE — DIETARY
NUTRITION SERVICES: BMI - Pt with BMI >40 (=Body mass index is 48.76 kg/m².), class III (extreme) obesity. Weight loss counseling not appropriate in acute care setting.     RECOMMEND - Referral to outpatient nutrition services for weight management after D/C.

## 2019-10-21 ENCOUNTER — SLEEP CENTER VISIT (OUTPATIENT)
Dept: SLEEP MEDICINE | Facility: MEDICAL CENTER | Age: 44
End: 2019-10-21
Payer: COMMERCIAL

## 2019-10-21 VITALS
WEIGHT: 293 LBS | DIASTOLIC BLOOD PRESSURE: 84 MMHG | HEART RATE: 83 BPM | RESPIRATION RATE: 15 BRPM | SYSTOLIC BLOOD PRESSURE: 134 MMHG | HEIGHT: 65 IN | OXYGEN SATURATION: 92 % | BODY MASS INDEX: 48.82 KG/M2

## 2019-10-21 DIAGNOSIS — I10 ESSENTIAL HYPERTENSION: ICD-10-CM

## 2019-10-21 DIAGNOSIS — J96.01 ACUTE RESPIRATORY FAILURE WITH HYPOXIA (HCC): ICD-10-CM

## 2019-10-21 DIAGNOSIS — J45.41 MODERATE PERSISTENT ASTHMA WITH ACUTE EXACERBATION: ICD-10-CM

## 2019-10-21 DIAGNOSIS — G47.30 SLEEP DISORDER BREATHING: ICD-10-CM

## 2019-10-21 PROCEDURE — 99244 OFF/OP CNSLTJ NEW/EST MOD 40: CPT | Performed by: FAMILY MEDICINE

## 2019-10-21 RX ORDER — ZOLPIDEM TARTRATE 5 MG/1
5 TABLET ORAL NIGHTLY PRN
Qty: 2 TAB | Refills: 0 | Status: SHIPPED | OUTPATIENT
Start: 2019-10-21 | End: 2019-10-22

## 2019-10-21 SDOH — HEALTH STABILITY: MENTAL HEALTH: HOW OFTEN DO YOU HAVE A DRINK CONTAINING ALCOHOL?: MONTHLY OR LESS

## 2019-10-21 NOTE — PATIENT INSTRUCTIONS
Zolpidem tablets  What is this medicine?  ZOLPIDEM (zole PI dem) is used to treat insomnia. This medicine helps you to fall asleep and sleep through the night.  This medicine may be used for other purposes; ask your health care provider or pharmacist if you have questions.  COMMON BRAND NAME(S): Karan  What should I tell my health care provider before I take this medicine?  They need to know if you have any of these conditions:  -depression  -history of drug abuse or addiction  -if you often drink alcohol  -liver disease  -lung or breathing disease  -myasthenia gravis  -sleep apnea  -suicidal thoughts, plans, or attempt; a previous suicide attempt by you or a family member  -an unusual or allergic reaction to zolpidem, other medicines, foods, dyes, or preservatives  -pregnant or trying to get pregnant  -breast-feeding  How should I use this medicine?  Take this medicine by mouth with a glass of water. Follow the directions on the prescription label. It is better to take this medicine on an empty stomach and only when you are ready for bed. Do not take your medicine more often than directed. If you have been taking this medicine for several weeks and suddenly stop taking it, you may get unpleasant withdrawal symptoms. Your doctor or health care professional may want to gradually reduce the dose. Do not stop taking this medicine on your own. Always follow your doctor or health care professional's advice.  A special MedGuide will be given to you by the pharmacist with each prescription and refill. Be sure to read this information carefully each time.  Talk to your pediatrician regarding the use of this medicine in children. Special care may be needed.  Overdosage: If you think you have taken too much of this medicine contact a poison control center or emergency room at once.  NOTE: This medicine is only for you. Do not share this medicine with others.  What if I miss a dose?  This does not apply. This medicine  "should only be taken immediately before going to sleep. Do not take double or extra doses.  What may interact with this medicine?  -alcohol  -antihistamines for allergy, cough and cold  -certain medicines for anxiety or sleep  -certain medicines for depression, like amitriptyline, fluoxetine, sertraline  -certain medicines for fungal infections like ketoconazole and itraconazole  -certain medicines for seizures like phenobarbital, primidone  -ciprofloxacin  -dietary supplements for sleep, like valerian or kava kava  -general anesthetics like halothane, isoflurane, methoxyflurane, propofol  -local anesthetics like lidocaine, pramoxine, tetracaine  -medicines that relax muscles for surgery  -narcotic medicines for pain  -phenothiazines like chlorpromazine, mesoridazine, prochlorperazine, thioridazine  -rifampin  This list may not describe all possible interactions. Give your health care provider a list of all the medicines, herbs, non-prescription drugs, or dietary supplements you use. Also tell them if you smoke, drink alcohol, or use illegal drugs. Some items may interact with your medicine.  What should I watch for while using this medicine?  Visit your doctor or health care professional for regular checks on your progress. Keep a regular sleep schedule by going to bed at about the same time each night. Avoid caffeine-containing drinks in the evening hours. When sleep medicines are used every night for more than a few weeks, they may stop working. Talk to your doctor if you still have trouble sleeping.  After taking this medicine for sleep, you may get up out of bed while not being fully awake and do an activity that you do not know you are doing. The next morning, you may have no memory of the event. Activities such as driving a car (\"sleep-driving\"), making and eating food, talking on the phone, sexual activity, and sleep-walking have been reported. Call your doctor right away if you find out you have done any of " these activities. Do not take this medicine if you have used alcohol that evening or before bed or taken another medicine for sleep since your risk of doing these sleep-related activities will be increased.  Wait for at least 8 hours after you take a dose before driving or doing other activities that require full mental alertness. Do not take this medicine unless you are able to stay in bed for a full night (7 to 8 hours) before you must be active again. You may have a decrease in mental alertness the day after use, even if you feel that you are fully awake. Tell your doctor if you will need to perform activities requiring full alertness, such as driving, the next day. Do not stand or sit up quickly after taking this medicine, especially if you are an older patient. This reduces the risk of dizzy or fainting spells.  If you or your family notice any changes in your behavior, such as new or worsening depression, thoughts of harming yourself, anxiety, other unusual or disturbing thoughts, or memory loss, call your doctor right away.  After you stop taking this medicine, you may have trouble falling asleep. This is called rebound insomnia. This problem usually goes away on its own after 1 or 2 nights.  What side effects may I notice from receiving this medicine?  Side effects that you should report to your doctor or health care professional as soon as possible:  -allergic reactions like skin rash, itching or hives, swelling of the face, lips, or tongue  -breathing problems  -changes in vision  -confusion  -depressed mood or other changes in moods or emotions  -feeling faint or lightheaded, falls  -hallucinations  -loss of balance or coordination  -loss of memory  -numbness or tingling of the tongue  -restlessness, excitability, or feelings of anxiety or agitation  -signs and symptoms of liver injury like dark yellow or brown urine; general ill feeling or flu-like symptoms; light-colored stools; loss of appetite;  nausea; right upper belly pain; unusually weak or tired; yellowing of the eyes or skin  -suicidal thoughts  -unusual activities while asleep like driving, eating, making phone calls, or sexual activity  Side effects that usually do not require medical attention (report to your doctor or health care professional if they continue or are bothersome):  -dizziness  -drowsiness the day after you take this medicine  -headache  This list may not describe all possible side effects. Call your doctor for medical advice about side effects. You may report side effects to FDA at 8-888-FDA-1777.  Where should I keep my medicine?  Keep out of the reach of children. This medicine can be abused. Keep your medicine in a safe place to protect it from theft. Do not share this medicine with anyone. Selling or giving away this medicine is dangerous and against the law.  This medicine may cause accidental overdose and death if taken by other adults, children, or pets. Mix any unused medicine with a substance like cat litter or coffee grounds. Then throw the medicine away in a sealed container like a sealed bag or a coffee can with a lid. Do not use the medicine after the expiration date.  Store at room temperature between 20 and 25 degrees C (68 and 77 degrees F).  NOTE: This sheet is a summary. It may not cover all possible information. If you have questions about this medicine, talk to your doctor, pharmacist, or health care provider.  © 2018 [employer]vier/Gold Standard (2017-03-22 14:38:20)  Sleep Apnea  Sleep apnea is a condition in which breathing pauses or becomes shallow during sleep. Episodes of sleep apnea usually last 10 seconds or longer, and they may occur as many as 20 times an hour. Sleep apnea disrupts your sleep and keeps your body from getting the rest that it needs. This condition can increase your risk of certain health problems, including:  · Heart attack.  · Stroke.  · Obesity.  · Diabetes.  · Heart failure.  · Irregular  heartbeat.  There are three kinds of sleep apnea:  · Obstructive sleep apnea. This kind is caused by a blocked or collapsed airway.  · Central sleep apnea. This kind happens when the part of the brain that controls breathing does not send the correct signals to the muscles that control breathing.  · Mixed sleep apnea. This is a combination of obstructive and central sleep apnea.  What are the causes?  The most common cause of this condition is a collapsed or blocked airway. An airway can collapse or become blocked if:  · Your throat muscles are abnormally relaxed.  · Your tongue and tonsils are larger than normal.  · You are overweight.  · Your airway is smaller than normal.  What increases the risk?  This condition is more likely to develop in people who:  · Are overweight.  · Smoke.  · Have a smaller than normal airway.  · Are elderly.  · Are male.  · Drink alcohol.  · Take sedatives or tranquilizers.  · Have a family history of sleep apnea.  What are the signs or symptoms?  Symptoms of this condition include:  · Trouble staying asleep.  · Daytime sleepiness and tiredness.  · Irritability.  · Loud snoring.  · Morning headaches.  · Trouble concentrating.  · Forgetfulness.  · Decreased interest in sex.  · Unexplained sleepiness.  · Mood swings.  · Personality changes.  · Feelings of depression.  · Waking up often during the night to urinate.  · Dry mouth.  · Sore throat.  How is this diagnosed?  This condition may be diagnosed with:  · A medical history.  · A physical exam.  · A series of tests that are done while you are sleeping (sleep study). These tests are usually done in a sleep lab, but they may also be done at home.  How is this treated?  Treatment for this condition aims to restore normal breathing and to ease symptoms during sleep. It may involve managing health issues that can affect breathing, such as high blood pressure or obesity. Treatment may include:  · Sleeping on your side.  · Using a decongestant  if you have nasal congestion.  · Avoiding the use of depressants, including alcohol, sedatives, and narcotics.  · Losing weight if you are overweight.  · Making changes to your diet.  · Quitting smoking.  · Using a device to open your airway while you sleep, such as:  ¨ An oral appliance. This is a custom-made mouthpiece that shifts your lower jaw forward.  ¨ A continuous positive airway pressure (CPAP) device. This device delivers oxygen to your airway through a mask.  ¨ A nasal expiratory positive airway pressure (EPAP) device. This device has valves that you put into each nostril.  ¨ A bi-level positive airway pressure (BPAP) device. This device delivers oxygen to your airway through a mask.  · Surgery if other treatments do not work. During surgery, excess tissue is removed to create a wider airway.  It is important to get treatment for sleep apnea. Without treatment, this condition can lead to:  · High blood pressure.  · Coronary artery disease.  · (Men) An inability to achieve or maintain an erection (impotence).  · Reduced thinking abilities.  Follow these instructions at home:  · Make any lifestyle changes that your health care provider recommends.  · Eat a healthy, well-balanced diet.  · Take over-the-counter and prescription medicines only as told by your health care provider.  · Avoid using depressants, including alcohol, sedatives, and narcotics.  · Take steps to lose weight if you are overweight.  · If you were given a device to open your airway while you sleep, use it only as told by your health care provider.  · Do not use any tobacco products, such as cigarettes, chewing tobacco, and e-cigarettes. If you need help quitting, ask your health care provider.  · Keep all follow-up visits as told by your health care provider. This is important.  Contact a health care provider if:  · The device that you received to open your airway during sleep is uncomfortable or does not seem to be working.  · Your  symptoms do not improve.  · Your symptoms get worse.  Get help right away if:  · You develop chest pain.  · You develop shortness of breath.  · You develop discomfort in your back, arms, or stomach.  · You have trouble speaking.  · You have weakness on one side of your body.  · You have drooping in your face.  These symptoms may represent a serious problem that is an emergency. Do not wait to see if the symptoms will go away. Get medical help right away. Call your local emergency services (911 in the [place] Do not drive yourself to the hospital.   This information is not intended to replace advice given to you by your health care provider. Make sure you discuss any questions you have with your health care provider.  Document Released: 12/08/2003 Document Revised: 08/13/2017 Document Reviewed: 09/26/2016  [employer]vier Interactive Patient Education © 2017 [employer]

## 2019-10-21 NOTE — PROGRESS NOTES
"     [facility]  Consult Note     Date: 10/21/2019 / Time: 8:41 AM    Patient ID:   Name:             Adri Maldonado   YOB: 1975  Age:                 44 y.o.  female   MRN:               [id number]      Thank you for requesting a sleep medicine consultation on Adri Maldonado at the sleep center. She presents today with the chief complaints of snoring and hypoxia. She is referred by Dr. Murcia for evaluation and treatment of sleep disorder breathing. She is currently on O2 at 2 L/min 24/7. Her comorbid conditions include respiratory failure with hypoxia, asthma and HTN.     HISTORY OF PRESENT ILLNESS:       At night,  Adri Maldonado goes to bed around 11 pm-1 am on weekdays and on the weekends. She gets out of bed at 7 am on weekdays and on the weekends.  She  Averages about 7-8 hrs of sleep on a good night,. Pt rarely has bad nights. She falls asleep within few minutes. She awakens 1 times during the night due to bathroom use. It takes her fe min to fall back asleep.     As per suppose questioner,She is aware of snoring/breathing pauses/gasping or choking in sleep.  She  denies any symptoms of restless legs syndrome such as an \"urge to move\"  She  legs in the evening or bedtime. She  denies any symptoms of narcolepsy such as sleep paralysis or cataplexy, or any symptoms to suggest parasomnias such as sleep walking or acting out of dreams. She  has not used any medications for the sleep problem.    Overall, she doesnot finds her sleep refreshing. In terms of  excessive daytime sleepiness,  She complains of sleepiness while watching TV however denies while  driving.She does take regular naps. The naps are usually  min long.      REVIEW OF SYSTEMS:       Constitutional: Denies fevers, Denies weight changes  Eyes: Denies changes in vision, no eye pain  Ears/Nose/Throat/Mouth: Denies nasal congestion or sore throat   Cardiovascular: Denies chest pain or palpitations   Respiratory: " Denies shortness of breath , Denies cough  Gastrointestinal/Hepatic: Denies abdominal pain, nausea, vomiting, diarrhea, constipation or GI bleeding   Genitourinary: Denies bladder dysfunction, dysuria or frequency  Musculoskeletal/Rheum: Denies  joint pain and swelling   Skin/Breast: Denies rash  Neurological: Denies headache, confusion, memory loss or focal weakness/parasthesias  Psychiatric: denies mood disorder     Comprehensive review of systems form is reviewed with the patient and is attached in the EMR.     PMH:  has a past medical history of ASTHMA.  MEDS:   Current Outpatient Medications:   •  lisinopril (PRINIVIL) 20 MG Tab, Take 20 mg by mouth 2 Times a Day., Disp: , Rfl:   •  metoprolol (LOPRESSOR) 25 MG Tab, Take 25 mg by mouth 2 times a day., Disp: , Rfl:   •  hydroCHLOROthiazide (HYDRODIURIL) 25 MG Tab, Take 25 mg by mouth every day., Disp: , Rfl:   •  potassium chloride SA (KDUR) 20 MEQ Tab CR, Take 20 mEq by mouth every day., Disp: , Rfl:   •  felodipine (PLENDIL) 5 MG TABLET SR 24 HR, Take 5 mg by mouth every day., Disp: , Rfl:   •  Omega-3 Fatty Acids (OMEGA 3 PO), Take 1 Dose by mouth every day. Unknown OTC Strength, Disp: , Rfl:   •  albuterol 108 (90 Base) MCG/ACT Aero Soln inhalation aerosol, Inhale 2 Puffs by mouth every 6 hours as needed for Shortness of Breath., Disp: 8.5 g, Rfl: 1  •  budesonide-formoterol (SYMBICORT) 80-4.5 MCG/ACT Aerosol, Inhale 2 Puffs by mouth 2 Times a Day., Disp: 1 Each, Rfl: 2  •  acetaminophen (TYLENOL) 325 MG Tab, Take 2 Tabs by mouth every 6 hours as needed (Mild Pain; (Pain scale 1-3); Temp greater than 100.5 F)., Disp: 30 Tab, Rfl: 0  •  ipratropium-albuterol (DUONEB) 0.5-2.5 (3) MG/3ML nebulizer solution, 3 mL by Nebulization route every four hours as needed for Shortness of Breath. (Patient not taking: Reported on 10/21/2019), Disp: 30 Bullet, Rfl: 0  ALLERGIES: No Known Allergies  SURGHX: History reviewed. No pertinent surgical history.  SOCHX:  reports  "that she has quit smoking. Her smoking use included cigarettes. She quit after 9.00 years of use. She has never used smokeless tobacco. She reports that she drinks alcohol. She reports that she does not use drugs.   FH: History reviewed. No pertinent family history.        Physical Exam:  Vitals/ General Appearance:   Weight/BMI: Body mass index is 51.42 kg/m².  /84 (BP Location: Right arm, Patient Position: Sitting, BP Cuff Size: Adult)   Pulse 83   Resp 15   Ht 1.651 m (5' 5\")   Wt (!) 140.2 kg (309 lb)   SpO2 92%   Vitals:    10/21/19 0829   BP: 134/84   BP Location: Right arm   Patient Position: Sitting   BP Cuff Size: Adult   Pulse: 83   Resp: 15   SpO2: 92%   Weight: (!) 140.2 kg (309 lb)   Height: 1.651 m (5' 5\")       Pt. is alert and oriented to time, place and person. Cooperative and in no apparent distress.       -Head: Atraumatic, normocephalic.   -. Ears: Normal tympanic membrane and no discharge  -. Nose: No inferior turbinate hypertophy, no septal deviation, no polyp.   -. Throat: Oropharynx appears crowded in that the palate is  overhanging (Malam Ellie scale 4. Tonsils are enlarged, uvula is large, pharynx not inflamed. .   -. Neck: Supple. No thyromegaly  -. Chest: Trachea central, no spine deformity   -. Lungs auscultation: B/L good air entry, vesicular breath sounds, no adventitious sounds  -. Heart auscultation: 1st and 2nd heart sounds normal, regular rhythm. No appreciable murmur.  - Extremities: no clubbing,2+ pedal edema.  - Skin: No rash  - NEUROLOGICAL EXAMINATION: On neurological exam, the patient was alert and oriented x3. speech was clear and fluent without dysarthria.      INVESTIGATIONS:       ASSESSMENT AND PLAN     1. She  has symptoms of Obstructive Sleep Apnea (KIRIT). She  has excessive daytime sleepiness that  interferes with activites of daily living. She  risk factors for KIRIT include obesity, thick neck, and crowded oropharynx.     The pathophysiology of KIRIT and the " increased risk of cardiovascular morbidity from untreated KIRIT is discussed in detail with the patient. She  also has HTN which can be worsened by her KIRIT.     We have discussed diagnostic options including in-laboratory, attended polysomnography and home sleep testing. We have also discussed treatment options including airway pressurization, reconstructive otolaryngologic surgery, dental appliances and weight management.       Subsequently,treatment options will be discussed based on the diagnostic study. Meanwhile, She is urged to avoid supine sleep, weight gain and alcoholic beverages since all of these can worsen KIRIT. She is cautioned against drowsy driving. If She feels sleepy while driving, She must pull over for a break/nap, rather than persist on the road, in the interest of She own safety and that of others on the road.    Plan  -  She  will be scheduled for an overnight PSG to assess sleep related  breathing disorder.    2. HTN: stable and asymptomatic. She is currently on lisinopril, metoprolol and HCTZ. Last ECHo was in 6/29/19 which showed Normal left ventricular systolic function. Left ventricular ejection fraction is visually estimated to be 65%. Normal diastolic function    3. Regarding treatment of other past medical problems and general health maintenance,  She is urged to follow up with PCP.

## 2019-11-17 ENCOUNTER — SLEEP STUDY (OUTPATIENT)
Dept: SLEEP MEDICINE | Facility: MEDICAL CENTER | Age: 44
End: 2019-11-17
Attending: FAMILY MEDICINE
Payer: COMMERCIAL

## 2019-11-17 DIAGNOSIS — G47.30 SLEEP DISORDER BREATHING: ICD-10-CM

## 2019-11-17 DIAGNOSIS — J45.41 MODERATE PERSISTENT ASTHMA WITH ACUTE EXACERBATION: ICD-10-CM

## 2019-11-17 DIAGNOSIS — J96.01 ACUTE RESPIRATORY FAILURE WITH HYPOXIA (HCC): ICD-10-CM

## 2019-11-17 PROCEDURE — 95811 POLYSOM 6/>YRS CPAP 4/> PARM: CPT | Performed by: FAMILY MEDICINE

## 2019-11-18 NOTE — PROCEDURES
The patient underwent a split night polysomnogram with a CPAP titration using the standard montage for measurement of paramaters of sleep, respiratory events, movement abnormalities, heart rate and rhythm.  A Microphone was used to monitior snoring.    Interpretation:  Study start time was 10:20:30 PM.  Total recording time was 3h 57.0m (237 minutes) with a total sleep time of 2h 9.0m (129 minutes) resulting in a sleep efficiency of 54.43%.Sleep latency from the start fo the study was 10 minutes minutes and REM latency from sleep onset was 00 minutes minutes.    Respiratory:   There were 0 apneas in total consisting of 0 obstructive apneas, 0 mixed apneas, and 0 central apneas.  There were 126 hypopneas in total.The apnea index was 0.00 per hour and the hypopnea index was 58.60 per hour.The overall AHI was 58.6, with a REM AHI of 0.00, and a supine AHI of 0.00.    Limb Movements:  There were a total of 4 periodic leg movements, of which 0 were PLMS arousals.  This resulted in a PLMS index of 1.9 and a PLMS arousal index of 0.0    Oximetry:  The mean SaO2 was 66.0% for the diagnostic portion of the study, with a minimum SaO2 of 51.0%.    Treatment:    Interpretation:  Treatment recording time was 3h 50.0m (230 minutes) with a total sleep time of 2h 53.0m (173 minutes) resulting in a sleep efficiency of 75.2%.  Sleep latency from the start of treatment was 13 minutes minutes and REM latency from sleep onset was 2h 33.0m minutes.  The patient had 63 arousals in total for an arousal index of 21.8.    Respiratory:   There were 0 apneas in total consisting of  0 obstructive apneas, 0 central apneas, and 0 mixed apneas for an apnea index of 0.00.  The patient had 56 hypopneas in total, which resulted in a hypopnea index of 19.42.  The overall AHI was 19.42, with a REM AHI of 10.71, and a supine AHI of 0.00.       Limb Movements:  There were a total of 14 periodic leg movements, of which 1 were PLMS arousals.  This resulted  in a PLMS index of 4.9 and a PLMS arousal index of 0.3.    Oximetry:  The mean SaO2 during treatment was 64.0%, with a minimum oxygen saturation of 50.0%.    CPAP was tried from 5cm H2O to 15cm H2O.      CPAP Titration:  Due to the significant number of obstructive respiratory events observed during the diagnostic portion of the study a CPAP titration trial was performed during the second half of the night. The CPAP pressure was initiated at 5 cm of water and the pressure was increased in an attempt to eliminate all sleep disordered breathing and snoring. The CPAP pressure was increased to CPAP 15 cm water and at this final pressure the patient was not observed in supine or REM sleep stage. The apnea hypopnea index improved to 0/hr with improved O2 nona of  64%.He spent 173 min of sleep time below 89% O2 saturation Snoring was resolved. The patient utilized medium dreamwhisp mask with heated humidification. The CPAP was well-tolerated and there was minimal air leaks.    Impression:  1.  Severe obstructive sleep apnea with AHI of 58.6/hr and O2 nona 53 %. Due to severity of the disease he met the split study protocol. The titration started with CPAP 5 cm and the best tolerated was CPAP 15 cm. The AHI improved to 0/hr with improved O2 nona of 64% and average O2 saturation of 67 %.     2.  Severe sleep related hypoxia  3.  Sinus tachycardia      Recommendations:  I recommend CPAP 15-20 cm with O2 bleed in and medium dreamwear whisp mask. I also recommend 30 day compliance download to assess the efficacy to the recommended pressure, measure leak, apnea hypopnea index and compliance for further outpatient monitoring and management of CPAP therapy. In some cases alternative treatment options may prove effective in resolving sleep apnea and these options include upper airway surgery, the use of a dental orthotic or weight loss and positional therapy. Clinical correlation is required. In general patients with sleep apnea  are advised to avoid alcohol and sedatives and to not operate a motor vehicle while drowsy and are at a greater risk for cardiovascular disease.

## 2019-11-25 ENCOUNTER — APPOINTMENT (OUTPATIENT)
Dept: PULMONOLOGY | Facility: HOSPICE | Age: 44
End: 2019-11-25
Payer: COMMERCIAL

## 2019-12-02 ENCOUNTER — APPOINTMENT (OUTPATIENT)
Dept: PULMONOLOGY | Facility: HOSPICE | Age: 44
End: 2019-12-02
Payer: COMMERCIAL

## 2021-06-11 ENCOUNTER — TELEPHONE (OUTPATIENT)
Dept: CARDIOLOGY | Facility: MEDICAL CENTER | Age: 46
End: 2021-06-11

## 2021-06-11 NOTE — TELEPHONE ENCOUNTER
Called patient to request records for NP appointment with . Called to confirm if this will be first time patient is seeing a cardiologist. No answer, left voicemail to call back.

## 2021-06-14 ENCOUNTER — APPOINTMENT (OUTPATIENT)
Dept: CARDIOLOGY | Facility: MEDICAL CENTER | Age: 46
End: 2021-06-14
Payer: COMMERCIAL

## 2021-06-14 NOTE — PROGRESS NOTES
Cardiology Initial Consultation Note    Date of note:    6/13/2021    Primary Care Provider: Deniz Murcia M.D.  Referring Provider: Lorna Desir P.A.-*     Patient Name: Adri Maldonado   YOB: 1975  MRN:              [id number]    Chief Complaint: HTN    History of Present Illness: Ms. Adri Maldonado is a 45 y.o. female whose current medical problems include HTN, polycythemia vera, and COPD who is here for cardiac consultation for HTN.      Cardiovascular Risk Factors:  1. Smoking status: ***  2. Type II Diabetes Mellitus: *** No results found for: HBA1C  3. Hypertension: ***  4. Dyslipidemia: *** No results found for: CHOLSTRLTOT, LDL, HDL, TRIGLYCERIDE  5. Family history of early Coronary Artery Disease in a first degree relative (Male less than 55 years of age; Female less than 65 years of age): ***  6.  Obesity and/or Metabolic Syndrome: ***  7. Sedentary lifestyle: ***    ROS      All other systems reviewed and are negative.       Past Medical History:   Diagnosis Date   • ASTHMA          No past surgical history on file.      Current Outpatient Medications   Medication Sig Dispense Refill   • lisinopril (PRINIVIL) 20 MG Tab Take 20 mg by mouth 2 Times a Day.     • metoprolol (LOPRESSOR) 25 MG Tab Take 25 mg by mouth 2 times a day.     • hydroCHLOROthiazide (HYDRODIURIL) 25 MG Tab Take 25 mg by mouth every day.     • potassium chloride SA (KDUR) 20 MEQ Tab CR Take 20 mEq by mouth every day.     • felodipine (PLENDIL) 5 MG TABLET SR 24 HR Take 5 mg by mouth every day.     • Omega-3 Fatty Acids (OMEGA 3 PO) Take 1 Dose by mouth every day. Unknown OTC Strength     • acetaminophen (TYLENOL) 325 MG Tab Take 2 Tabs by mouth every 6 hours as needed (Mild Pain; (Pain scale 1-3); Temp greater than 100.5 F). 30 Tab 0   • albuterol 108 (90 Base) MCG/ACT Aero Soln inhalation aerosol Inhale 2 Puffs by mouth every 6 hours as needed for Shortness of Breath. 8.5 g 1   • ipratropium-albuterol  (DUONEB) 0.5-2.5 (3) MG/3ML nebulizer solution 3 mL by Nebulization route every four hours as needed for Shortness of Breath. (Patient not taking: Reported on 10/21/2019) 30 Bullet 0   • budesonide-formoterol (SYMBICORT) 80-4.5 MCG/ACT Aerosol Inhale 2 Puffs by mouth 2 Times a Day. 1 Each 2     No current facility-administered medications for this visit.         No Known Allergies      No family history on file.      Social History     Socioeconomic History   • Marital status:      Spouse name: Not on file   • Number of children: Not on file   • Years of education: Not on file   • Highest education level: Not on file   Occupational History   • Not on file   Tobacco Use   • Smoking status: Former Smoker     Years: 9.00     Types: Cigarettes   • Smokeless tobacco: Never Used   Vaping Use   • Vaping Use: Never used   Substance and Sexual Activity   • Alcohol use: Yes     Comment: occ   • Drug use: No   • Sexual activity: Not on file   Other Topics Concern   • Not on file   Social History Narrative   • Not on file     Social Determinants of Health     Financial Resource Strain:    • Difficulty of Paying Living Expenses:    Food Insecurity:    • Worried About Running Out of Food in the Last Year:    • Ran Out of Food in the Last Year:    Transportation Needs:    • Lack of Transportation (Medical):    • Lack of Transportation (Non-Medical):    Physical Activity:    • Days of Exercise per Week:    • Minutes of Exercise per Session:    Stress:    • Feeling of Stress :    Social Connections:    • Frequency of Communication with Friends and Family:    • Frequency of Social Gatherings with Friends and Family:    • Attends Ten[employer] Services:    • Active Member of Clubs or Organizations:    • Attends Club or Organization Meetings:    • Marital Status:    Intimate Partner Violence:    • Fear of Current or Ex-Partner:    • Emotionally Abused:    • Physically Abused:    • Sexually Abused:          Physical Exam:  Ambulatory  Vitals  There were no vitals taken for this visit.   Oxygen Therapy:     BP Readings from Last 4 Encounters:   10/21/19 134/84   06/29/19 144/96   05/15/15 (!) 170/110   02/02/13 148/80       Weight/BMI: There is no height or weight on file to calculate BMI.  Wt Readings from Last 4 Encounters:   10/21/19 (!) 140 kg (309 lb)   06/29/19 (!) 133 kg (292 lb 15.9 oz)   05/15/15 137 kg (301 lb)   02/02/13 136 kg (300 lb)         General: Well appearing and in no apparent distress  Eyes: ***nl conjunctiva, no icteric sclera  ENT: wearing a mask***, normal external appearance of ears  Neck: ***no visible JVP, *** no carotid bruits  Lungs: normal respiratory effort, CTAB  Heart: ***RRR, ***no murmurs, no rubs or gallops, *** no edema bilateral lower extremities. No LV/RV heave on cardiac palpatation. ***+ bilateral radial pulses.  ***+ bilateral dp pulses.   Abdomen: soft, non tender, non distended, no masses, normal bowel sounds.  No HSM.  Extremities/MSK: no clubbing, no cyanosis  Neurological: No focal sensory deficits  Psychiatric: Appropriate affect, A/O x 3, intact judgement and insight  Skin: Warm extremities      Lab Data Review:  No results found for: CHOLSTRLTOT, LDL, HDL, TRIGLYCERIDE    Lab Results   Component Value Date/Time    SODIUM 139 06/29/2019 09:31 AM    POTASSIUM 4.2 06/29/2019 09:31 AM    CHLORIDE 97 06/29/2019 09:31 AM    CO2 25 06/29/2019 09:31 AM    GLUCOSE 100 (H) 06/29/2019 09:31 AM    BUN 14 06/29/2019 09:31 AM    CREATININE 0.60 06/29/2019 09:31 AM    CREATININE 0.6 05/28/2006 03:30 PM     Lab Results   Component Value Date/Time    ALKPHOSPHAT 72 06/28/2019 10:22 PM    ASTSGOT 21 06/28/2019 10:22 PM    ALTSGPT 11 06/28/2019 10:22 PM    TBILIRUBIN 1.0 06/28/2019 10:22 PM      Lab Results   Component Value Date/Time    WBC 6.6 06/29/2019 09:31 AM     No results found for: HBA1C      Cardiac Imaging and Procedures Review:    EKG dated ***: My personal interpretation is ***    Echo dated 6/29/2019:     CONCLUSIONS  No prior study is available for comparison.   Normal left ventricular systolic function. Left ventricular ejection   fraction is visually estimated to be 65%.   Normal diastolic function.  Normal inferior vena cava size and inspiratory collapse.        Assessment & Plan     No diagnosis found.      Shared Medical Decision Making:  ***    All of *** excellent questions were answered to the best of my knowledge and to *** satisfaction.  It was a pleasure seeing Ms. Adri Maldonado in my clinic today. No follow-ups on file. Patient is aware to call the cardiology clinic with any questions or concerns.      Delvis Carrasco MD  [facility], [address]  [address] [address]  Phone: [phone]  Fax: [phone]

## 2021-06-15 ASSESSMENT — ENCOUNTER SYMPTOMS
DYSPNEA ON EXERTION: 0
NAUSEA: 0
DIARRHEA: 0
ABDOMINAL PAIN: 0
WEAKNESS: 0
DIZZINESS: 0
FEVER: 0
WHEEZING: 0
COUGH: 0
NIGHT SWEATS: 0
PND: 0
VOMITING: 0
ORTHOPNEA: 0
PALPITATIONS: 0
SYNCOPE: 0
FOCAL WEAKNESS: 0
NEAR-SYNCOPE: 0
IRREGULAR HEARTBEAT: 0
SHORTNESS OF BREATH: 0

## 2021-06-15 NOTE — PROGRESS NOTES
Cardiology Initial Consultation Note    Date of note:    6/16/2021    Primary Care Provider: Deniz Murcia M.D.  Referring Provider: Lonra Desir P.A.-*     Patient Name: Adri Maldonado   YOB: 1975  MRN:              [id number]    Chief Complaint: HTN     History of Present Illness: Ms. Adri Maldonado is a 45 y.o. female whose current medical problems include HTN (diagnosed 2015), polycythemia vera, and COPD who is here for cardiac consultation for HTN.    The patient reports that she was referred to cardiology due to her uncontrolled hypertension.  She denies any symptoms.  She is starting to become more active, walks every other day without any chest pain or shortness of breath.  No orthopnea, PND, or leg swelling.  No palpitations.  No syncope or presyncopal episodes.      Cardiovascular Risk Factors:  1. Smoking status: Former smoker  2. Type II Diabetes Mellitus: None  3. Hypertension: On medication  4. Dyslipidemia: None  5. Family history of early Coronary Artery Disease in a first degree relative (Male less than 55 years of age; Female less than 65 years of age): None  6.  Obesity and/or Metabolic Syndrome: BMI 50.42  7. Sedentary lifestyle: Not sedentary, walking every other day ~0.5 mile    Review of Systems   Constitutional: Negative for fever, malaise/fatigue and night sweats.   Cardiovascular: Negative for chest pain, dyspnea on exertion, irregular heartbeat, leg swelling, near-syncope, orthopnea, palpitations, paroxysmal nocturnal dyspnea and syncope.   Respiratory: Negative for cough, shortness of breath and wheezing.    Gastrointestinal: Negative for abdominal pain, diarrhea, nausea and vomiting.   Neurological: Negative for dizziness, focal weakness and weakness.       All other systems reviewed and are negative.       Current Outpatient Medications   Medication Sig Dispense Refill   • aspirin 81 MG EC tablet      • cetirizine (ZYRTEC) 10 MG Tab      • WIXELA INHUB  "250-50 MCG/DOSE AEROSOL POWDER, BREATH ACTIVATED      • lisinopril (PRINIVIL) 20 MG Tab Take 20 mg by mouth 2 Times a Day.     • metoprolol (LOPRESSOR) 25 MG Tab Take 25 mg by mouth 2 times a day.     • hydroCHLOROthiazide (HYDRODIURIL) 25 MG Tab Take 25 mg by mouth every day.     • potassium chloride SA (KDUR) 20 MEQ Tab CR Take 20 mEq by mouth every day.     • Omega-3 Fatty Acids (OMEGA 3 PO) Take 1 Dose by mouth every day. Unknown OTC Strength     • acetaminophen (TYLENOL) 325 MG Tab Take 2 Tabs by mouth every 6 hours as needed (Mild Pain; (Pain scale 1-3); Temp greater than 100.5 F). 30 Tab 0   • albuterol 108 (90 Base) MCG/ACT Aero Soln inhalation aerosol Inhale 2 Puffs by mouth every 6 hours as needed for Shortness of Breath. 8.5 g 1   • ipratropium-albuterol (DUONEB) 0.5-2.5 (3) MG/3ML nebulizer solution 3 mL by Nebulization route every four hours as needed for Shortness of Breath. 30 Bullet 0   • metoprolol tartrate (LOPRESSOR) 100 MG Tab        No current facility-administered medications for this visit.         No Known Allergies      Physical Exam:  Ambulatory Vitals  /86 (BP Location: Left arm, Patient Position: Sitting, BP Cuff Size: Adult)   Pulse 84   Ht 1.651 m (5' 5\")   Wt (!) 137 kg (303 lb)   SpO2 94%    Oxygen Therapy:  Pulse Oximetry: 94 %  BP Readings from Last 4 Encounters:   06/16/21 138/86   10/21/19 134/84   06/29/19 144/96   05/15/15 (!) 170/110       Weight/BMI: Body mass index is 50.42 kg/m².  Wt Readings from Last 4 Encounters:   06/16/21 (!) 137 kg (303 lb)   10/21/19 (!) 140 kg (309 lb)   06/29/19 (!) 133 kg (292 lb 15.9 oz)   05/15/15 137 kg (301 lb)       General: Well appearing and in no apparent distress  Eyes: nl conjunctiva, no icteric sclera  ENT: wearing a mask, normal external appearance of ears  Neck: no visible JVP,  no carotid bruits  Lungs: normal respiratory effort, CTAB  Heart: RRR, no murmurs, no rubs or gallops,  no edema bilateral lower extremities. No " LV/RV heave on cardiac palpatation. + bilateral radial pulses.  + bilateral dp pulses.   Abdomen: soft, non tender, non distended, no masses, normal bowel sounds.  No HSM.  Extremities/MSK: no clubbing, no cyanosis  Neurological: No focal sensory deficits  Psychiatric: Appropriate affect, A/O x 3, intact judgement and insight  Skin: Warm extremities      Lab Data Review:  No results found for: CHOLSTRLTOT, LDL, HDL, TRIGLYCERIDE    Lab Results   Component Value Date/Time    SODIUM 139 06/29/2019 09:31 AM    POTASSIUM 4.2 06/29/2019 09:31 AM    CHLORIDE 97 06/29/2019 09:31 AM    CO2 25 06/29/2019 09:31 AM    GLUCOSE 100 (H) 06/29/2019 09:31 AM    BUN 14 06/29/2019 09:31 AM    CREATININE 0.60 06/29/2019 09:31 AM    CREATININE 0.6 05/28/2006 03:30 PM     Lab Results   Component Value Date/Time    ALKPHOSPHAT 72 06/28/2019 10:22 PM    ASTSGOT 21 06/28/2019 10:22 PM    ALTSGPT 11 06/28/2019 10:22 PM    TBILIRUBIN 1.0 06/28/2019 10:22 PM      Lab Results   Component Value Date/Time    WBC 6.6 06/29/2019 09:31 AM     No results found for: HBA1C      Cardiac Imaging and Procedures Review:    EKG dated 6/16/2021: My personal interpretation is Sinus rhythm, biatrial abnormalities.  Borderline right axis deviation    Echo dated 6/29/2019:   CONCLUSIONS  No prior study is available for comparison.   Normal left ventricular systolic function. Left ventricular ejection   fraction is visually estimated to be 65%.   Normal diastolic function.  Normal inferior vena cava size and inspiratory collapse.        Assessment & Plan     1. Essential hypertension  EKG    EC-ECHOCARDIOGRAM COMPLETE W/O CONT   2. Sleep apnea, unspecified type           Shared Medical Decision Making:    Hypertension  Pressure borderline elevated today.  Patient does not take hydrochlorothiazide regularly.  -Continue lisinopril and metoprolol.  -Instructed the patient to start taking hydrochlorothiazide 25 mg daily.  Can hold felodipine while starting  hydrochlorothiazide.  Instructed the patient to measure blood pressure at home.  If remains above 130/80, can add back felodipine.  -Obtain echocardiogram to assess LVEF and any structural abnormalities  -Continue treatment for sleep apnea  -Counseled the patient on diet and exercise     All of the patient's excellent questions were answered to the best of my knowledge and to her satisfaction.  It was a pleasure seeing Ms. Adri Maldonado in my clinic today. Return in about 6 months (around 12/16/2021). Patient is aware to call the cardiology clinic with any questions or concerns.      Delvis Carrasco MD  [facility]  [place], [address]  [address] [address]  Phone: [phone]  Fax: [phone]

## 2021-06-16 ENCOUNTER — OFFICE VISIT (OUTPATIENT)
Dept: CARDIOLOGY | Facility: MEDICAL CENTER | Age: 46
End: 2021-06-16
Payer: COMMERCIAL

## 2021-06-16 VITALS
WEIGHT: 293 LBS | OXYGEN SATURATION: 94 % | HEIGHT: 65 IN | HEART RATE: 84 BPM | SYSTOLIC BLOOD PRESSURE: 138 MMHG | BODY MASS INDEX: 48.82 KG/M2 | DIASTOLIC BLOOD PRESSURE: 86 MMHG

## 2021-06-16 DIAGNOSIS — I10 ESSENTIAL HYPERTENSION: ICD-10-CM

## 2021-06-16 DIAGNOSIS — G47.30 SLEEP APNEA, UNSPECIFIED TYPE: ICD-10-CM

## 2021-06-16 LAB — EKG IMPRESSION: NORMAL

## 2021-06-16 PROCEDURE — 93000 ELECTROCARDIOGRAM COMPLETE: CPT | Performed by: STUDENT IN AN ORGANIZED HEALTH CARE EDUCATION/TRAINING PROGRAM

## 2021-06-16 PROCEDURE — 99244 OFF/OP CNSLTJ NEW/EST MOD 40: CPT | Performed by: STUDENT IN AN ORGANIZED HEALTH CARE EDUCATION/TRAINING PROGRAM

## 2021-06-16 RX ORDER — CETIRIZINE HYDROCHLORIDE 10 MG/1
TABLET ORAL
COMMUNITY
Start: 2021-05-03 | End: 2021-10-30

## 2021-06-16 RX ORDER — METOPROLOL TARTRATE 100 MG/1
TABLET ORAL
COMMUNITY
Start: 2021-06-01 | End: 2021-10-30

## 2021-06-16 RX ORDER — ASPIRIN 81 MG/1
TABLET ORAL
COMMUNITY
Start: 2021-05-03 | End: 2021-10-30

## 2021-06-16 ASSESSMENT — FIBROSIS 4 INDEX: FIB4 SCORE: 1.266347647226607215

## 2021-06-16 NOTE — PATIENT INSTRUCTIONS
Schedule echocardiogram    Start hydrocholorthiazide.  You can stop felodipine.   Measure blood pressure at home.  Your blood pressure should be <130/80. If blood pressure consistently above 130/80, add felodipine.

## 2021-10-18 ENCOUNTER — HOSPITAL ENCOUNTER (OUTPATIENT)
Dept: CARDIOLOGY | Facility: MEDICAL CENTER | Age: 46
End: 2021-10-18
Attending: STUDENT IN AN ORGANIZED HEALTH CARE EDUCATION/TRAINING PROGRAM
Payer: COMMERCIAL

## 2021-10-18 DIAGNOSIS — I10 ESSENTIAL HYPERTENSION: ICD-10-CM

## 2021-10-18 LAB
LV EJECT FRACT MOD 2C 99903: 70.07
LV EJECT FRACT MOD 4C 99902: 66.58
LV EJECT FRACT MOD BP 99901: 68.33

## 2021-10-18 PROCEDURE — 93306 TTE W/DOPPLER COMPLETE: CPT | Mod: 26 | Performed by: STUDENT IN AN ORGANIZED HEALTH CARE EDUCATION/TRAINING PROGRAM

## 2021-10-18 PROCEDURE — 93306 TTE W/DOPPLER COMPLETE: CPT

## 2021-10-30 ENCOUNTER — OFFICE VISIT (OUTPATIENT)
Dept: URGENT CARE | Facility: CLINIC | Age: 46
End: 2021-10-30
Payer: COMMERCIAL

## 2021-10-30 VITALS
SYSTOLIC BLOOD PRESSURE: 140 MMHG | HEART RATE: 74 BPM | RESPIRATION RATE: 26 BRPM | WEIGHT: 293 LBS | TEMPERATURE: 97.5 F | DIASTOLIC BLOOD PRESSURE: 88 MMHG | OXYGEN SATURATION: 100 % | BODY MASS INDEX: 48.82 KG/M2 | HEIGHT: 65 IN

## 2021-10-30 DIAGNOSIS — R53.83 FATIGUE, UNSPECIFIED TYPE: ICD-10-CM

## 2021-10-30 DIAGNOSIS — U07.1 COVID-19: ICD-10-CM

## 2021-10-30 DIAGNOSIS — R50.81 FEVER IN OTHER DISEASES: ICD-10-CM

## 2021-10-30 DIAGNOSIS — Z99.81 OXYGEN DEPENDENT: ICD-10-CM

## 2021-10-30 LAB
EXTERNAL QUALITY CONTROL: ABNORMAL
SARS-COV+SARS-COV-2 AG RESP QL IA.RAPID: POSITIVE

## 2021-10-30 PROCEDURE — 87426 SARSCOV CORONAVIRUS AG IA: CPT | Performed by: FAMILY MEDICINE

## 2021-10-30 PROCEDURE — 99204 OFFICE O/P NEW MOD 45 MIN: CPT | Mod: CS | Performed by: FAMILY MEDICINE

## 2021-10-30 RX ORDER — FELODIPINE 10 MG/1
10 TABLET, EXTENDED RELEASE ORAL DAILY
COMMUNITY
Start: 2021-09-10 | End: 2021-11-22

## 2021-10-30 ASSESSMENT — ENCOUNTER SYMPTOMS
MYALGIAS: 0
EYE REDNESS: 0
EYE DISCHARGE: 0
VOMITING: 0
NAUSEA: 0
WEIGHT LOSS: 0

## 2021-10-30 NOTE — PROGRESS NOTES
"Subjective     Adri Maldonado is a 46 y.o. female who presents with Coronavirus Screening (Exposure, cold sweats, fatigue, feverish,  runny nose, headaches, stomach aches x 4 days  )            Onset 10/26 fatigue, headache, fever.  T-max 101F.  No cough.  Chronic respiratory failure on O2 noting mild new shortness of breath.  Possible COVID-19 exposure.  Unvaccinated.  No other aggravating or alleviating factors.      Review of Systems   Constitutional: Negative for malaise/fatigue and weight loss.   Eyes: Negative for discharge and redness.   Gastrointestinal: Negative for nausea and vomiting.   Musculoskeletal: Negative for joint pain and myalgias.   Skin: Negative for itching and rash.              Objective     /88   Pulse 74   Temp 36.4 °C (97.5 °F)   Resp (!) 26   Ht 1.651 m (5' 5\")   Wt (!) 136 kg (300 lb 6.4 oz)   SpO2 100%   BMI 49.99 kg/m²      Physical Exam  Constitutional:       General: She is not in acute distress.     Appearance: She is well-developed.   HENT:      Head: Normocephalic and atraumatic.      Nose: Congestion present.      Mouth/Throat:      Mouth: Mucous membranes are moist.   Eyes:      Conjunctiva/sclera: Conjunctivae normal.   Cardiovascular:      Rate and Rhythm: Regular rhythm. Tachycardia present.      Heart sounds: Normal heart sounds. No murmur heard.     Pulmonary:      Effort: Pulmonary effort is normal.      Breath sounds: Normal breath sounds. No wheezing.      Comments: Hypoxic to 70s on room air notes that this is not unusual for her.  Skin:     General: Skin is warm and dry.      Findings: No rash.   Neurological:      Mental Status: She is alert and oriented to person, place, and time.                             Assessment & Plan      POCT Covid positive    1. Fatigue, unspecified type  POCT SARS-COV Antigen ANA (Symptomatic Only)   2. Fever in other diseases  POCT SARS-COV Antigen ANA (Symptomatic Only)   3. Oxygen dependent     4. COVID-19   "       Differential diagnosis, natural history, supportive care, and indications for immediate follow-up discussed at length.     Offered chest x-ray today.  Adri declines noting that she would like to get that through the [facility] on Monday.  She understands the risks and will go to the hospital if breathing worsens.  I also offered referral to the infusion center for Regeneron therapy.  She would like to talk to call any about this on Monday as well.  She understands that the treatment is time-limited.  I will contact her around lunchtime on Monday to determine if the [facility] can help her with this.

## 2021-10-31 ENCOUNTER — APPOINTMENT (OUTPATIENT)
Dept: RADIOLOGY | Facility: MEDICAL CENTER | Age: 46
DRG: 871 | End: 2021-10-31
Attending: SPECIALIST
Payer: COMMERCIAL

## 2021-10-31 ENCOUNTER — APPOINTMENT (OUTPATIENT)
Dept: RADIOLOGY | Facility: MEDICAL CENTER | Age: 46
DRG: 871 | End: 2021-10-31
Attending: EMERGENCY MEDICINE
Payer: COMMERCIAL

## 2021-10-31 ENCOUNTER — HOSPITAL ENCOUNTER (INPATIENT)
Facility: MEDICAL CENTER | Age: 46
LOS: 7 days | DRG: 871 | End: 2021-11-07
Attending: EMERGENCY MEDICINE | Admitting: INTERNAL MEDICINE
Payer: COMMERCIAL

## 2021-10-31 DIAGNOSIS — E83.51 HYPOCALCEMIA: ICD-10-CM

## 2021-10-31 DIAGNOSIS — R40.4 ALTERED LEVEL OF CONSCIOUSNESS: ICD-10-CM

## 2021-10-31 DIAGNOSIS — Z86.79 HISTORY OF CHF (CONGESTIVE HEART FAILURE): ICD-10-CM

## 2021-10-31 DIAGNOSIS — J96.00 ACUTE RESPIRATORY FAILURE DUE TO COVID-19 (HCC): ICD-10-CM

## 2021-10-31 DIAGNOSIS — R65.21 SEPTIC SHOCK (HCC): ICD-10-CM

## 2021-10-31 DIAGNOSIS — A41.9 SEPTIC SHOCK (HCC): ICD-10-CM

## 2021-10-31 DIAGNOSIS — U07.1 ACUTE RESPIRATORY FAILURE DUE TO COVID-19 (HCC): ICD-10-CM

## 2021-10-31 DIAGNOSIS — I16.0 HYPERTENSIVE URGENCY: ICD-10-CM

## 2021-10-31 DIAGNOSIS — U07.1 REAL TIME REVERSE TRANSCRIPTASE PCR POSITIVE FOR COVID-19 VIRUS: ICD-10-CM

## 2021-10-31 DIAGNOSIS — K11.20 SIALADENITIS: ICD-10-CM

## 2021-10-31 DIAGNOSIS — J96.01 ACUTE RESPIRATORY FAILURE WITH HYPOXIA (HCC): ICD-10-CM

## 2021-10-31 DIAGNOSIS — J43.1 PANLOBULAR EMPHYSEMA (HCC): Chronic | ICD-10-CM

## 2021-10-31 DIAGNOSIS — E66.01 MORBID OBESITY (HCC): ICD-10-CM

## 2021-10-31 DIAGNOSIS — E87.1 HYPONATREMIA: ICD-10-CM

## 2021-10-31 PROBLEM — G93.40 ENCEPHALOPATHY ACUTE: Status: ACTIVE | Noted: 2021-10-31

## 2021-10-31 PROBLEM — I95.9 ARTERIAL HYPOTENSION: Status: ACTIVE | Noted: 2021-10-31

## 2021-10-31 LAB
ALBUMIN SERPL BCP-MCNC: 2.7 G/DL (ref 3.2–4.9)
ALBUMIN/GLOB SERPL: 0.6 G/DL
ALP SERPL-CCNC: 107 U/L (ref 30–99)
ALT SERPL-CCNC: 16 U/L (ref 2–50)
AMORPH CRY #/AREA URNS HPF: PRESENT /HPF
ANION GAP SERPL CALC-SCNC: 15 MMOL/L (ref 7–16)
APPEARANCE UR: CLEAR
APTT PPP: 38.3 SEC (ref 24.7–36)
AST SERPL-CCNC: 33 U/L (ref 12–45)
BACTERIA #/AREA URNS HPF: ABNORMAL /HPF
BASE EXCESS BLDA CALC-SCNC: 2 MMOL/L (ref -4–3)
BASOPHILS # BLD AUTO: 0.3 % (ref 0–1.8)
BASOPHILS # BLD: 0.03 K/UL (ref 0–0.12)
BILIRUB SERPL-MCNC: 1.3 MG/DL (ref 0.1–1.5)
BILIRUB UR QL STRIP.AUTO: ABNORMAL
BODY TEMPERATURE: ABNORMAL DEGREES
BUN SERPL-MCNC: 12 MG/DL (ref 8–22)
CALCIUM SERPL-MCNC: 7.9 MG/DL (ref 8.4–10.2)
CHLORIDE SERPL-SCNC: 76 MMOL/L (ref 96–112)
CO2 BLDA-SCNC: 35 MMOL/L (ref 20–33)
CO2 SERPL-SCNC: 25 MMOL/L (ref 20–33)
COLOR UR: YELLOW
COMMENT 1642: NORMAL
CORTIS SERPL-MCNC: 27 UG/DL (ref 0–23)
CREAT SERPL-MCNC: 0.84 MG/DL (ref 0.5–1.4)
CRP SERPL HS-MCNC: 10.53 MG/DL (ref 0–0.75)
CRP SERPL HS-MCNC: 12.83 MG/DL (ref 0–0.75)
D DIMER PPP IA.FEU-MCNC: 1.87 UG/ML (FEU) (ref 0–0.5)
D DIMER PPP IA.FEU-MCNC: 2.04 UG/ML (FEU) (ref 0–0.5)
DELSYS IDSYS: ABNORMAL
EKG IMPRESSION: NORMAL
EOSINOPHIL # BLD AUTO: 0 K/UL (ref 0–0.51)
EOSINOPHIL NFR BLD: 0 % (ref 0–6.9)
EPI CELLS #/AREA URNS HPF: ABNORMAL /HPF
ERYTHROCYTE [DISTWIDTH] IN BLOOD BY AUTOMATED COUNT: 49.8 FL (ref 35.9–50)
GLOBULIN SER CALC-MCNC: 4.4 G/DL (ref 1.9–3.5)
GLUCOSE BLD-MCNC: 215 MG/DL (ref 65–99)
GLUCOSE SERPL-MCNC: 183 MG/DL (ref 65–99)
GLUCOSE UR STRIP.AUTO-MCNC: NEGATIVE MG/DL
GRAN CASTS #/AREA URNS LPF: ABNORMAL /LPF
HCO3 BLDA-SCNC: 32.5 MMOL/L (ref 17–25)
HCT VFR BLD AUTO: 57.8 % (ref 37–47)
HGB BLD-MCNC: 16.7 G/DL (ref 12–16)
HYALINE CASTS #/AREA URNS LPF: ABNORMAL /LPF
IMM GRANULOCYTES # BLD AUTO: 0.05 K/UL (ref 0–0.11)
IMM GRANULOCYTES NFR BLD AUTO: 0.5 % (ref 0–0.9)
INR PPP: 1.19 (ref 0.87–1.13)
KETONES UR STRIP.AUTO-MCNC: ABNORMAL MG/DL
LACTATE BLD-SCNC: 1.7 MMOL/L (ref 0.5–2)
LACTATE BLD-SCNC: 1.8 MMOL/L (ref 0.5–2)
LACTATE BLD-SCNC: 2.5 MMOL/L (ref 0.5–2)
LEUKOCYTE ESTERASE UR QL STRIP.AUTO: NEGATIVE
LYMPHOCYTES # BLD AUTO: 1.2 K/UL (ref 1–4.8)
LYMPHOCYTES NFR BLD: 12 % (ref 22–41)
MAGNESIUM SERPL-MCNC: 1.4 MG/DL (ref 1.5–2.5)
MCH RBC QN AUTO: 22.2 PG (ref 27–33)
MCHC RBC AUTO-ENTMCNC: 28.9 G/DL (ref 33.6–35)
MCV RBC AUTO: 76.9 FL (ref 81.4–97.8)
MICRO URNS: ABNORMAL
MICROCYTES BLD QL SMEAR: ABNORMAL
MONOCYTES # BLD AUTO: 1.1 K/UL (ref 0–0.85)
MONOCYTES NFR BLD AUTO: 11 % (ref 0–13.4)
NEUTROPHILS # BLD AUTO: 7.61 K/UL (ref 2–7.15)
NEUTROPHILS NFR BLD: 76.2 % (ref 44–72)
NITRITE UR QL STRIP.AUTO: NEGATIVE
NRBC # BLD AUTO: 0 K/UL
NRBC BLD-RTO: 0 /100 WBC
PCO2 BLDA: 68.5 MMHG (ref 26–37)
PH BLDA: 7.28 [PH] (ref 7.4–7.5)
PH TEMP ADJ BLDA: 7.27 [PH] (ref 7.4–7.5)
PH UR STRIP.AUTO: 6 [PH] (ref 5–8)
PHOSPHATE SERPL-MCNC: 2.7 MG/DL (ref 2.5–4.5)
PLATELET # BLD AUTO: 203 K/UL (ref 164–446)
PLATELET BLD QL SMEAR: NORMAL
PMV BLD AUTO: 10 FL (ref 9–12.9)
PO2 BLDA: 81 MMHG (ref 64–87)
PO2 TEMP ADJ BLDA: 85 MMHG (ref 64–87)
POTASSIUM SERPL-SCNC: 4 MMOL/L (ref 3.6–5.5)
PROCALCITONIN SERPL-MCNC: 0.13 NG/ML
PROT SERPL-MCNC: 7.1 G/DL (ref 6–8.2)
PROT UR QL STRIP: >=300 MG/DL
PROTHROMBIN TIME: 14.2 SEC (ref 12–14.6)
RBC # BLD AUTO: 7.52 M/UL (ref 4.2–5.4)
RBC # URNS HPF: ABNORMAL /HPF
RBC BLD AUTO: PRESENT
RBC UR QL AUTO: ABNORMAL
SAO2 % BLDA: 94 % (ref 93–99)
SODIUM SERPL-SCNC: 114 MMOL/L (ref 135–145)
SODIUM SERPL-SCNC: 116 MMOL/L (ref 135–145)
SP GR UR STRIP.AUTO: 1.02
SPECIMEN DRAWN FROM PATIENT: ABNORMAL
TRANS CELLS URNS QL MICRO: ABNORMAL /HPF
TRIGL SERPL-MCNC: 118 MG/DL (ref 0–149)
TROPONIN T SERPL-MCNC: 10 NG/L (ref 6–19)
WBC # BLD AUTO: 10 K/UL (ref 4.8–10.8)
WBC #/AREA URNS HPF: ABNORMAL /HPF

## 2021-10-31 PROCEDURE — 99291 CRITICAL CARE FIRST HOUR: CPT

## 2021-10-31 PROCEDURE — 31500 INSERT EMERGENCY AIRWAY: CPT

## 2021-10-31 PROCEDURE — 770022 HCHG ROOM/CARE - ICU (200)

## 2021-10-31 PROCEDURE — 94760 N-INVAS EAR/PLS OXIMETRY 1: CPT

## 2021-10-31 PROCEDURE — 84100 ASSAY OF PHOSPHORUS: CPT

## 2021-10-31 PROCEDURE — 96375 TX/PRO/DX INJ NEW DRUG ADDON: CPT

## 2021-10-31 PROCEDURE — 0BH17EZ INSERTION OF ENDOTRACHEAL AIRWAY INTO TRACHEA, VIA NATURAL OR ARTIFICIAL OPENING: ICD-10-PCS | Performed by: INTERNAL MEDICINE

## 2021-10-31 PROCEDURE — 700105 HCHG RX REV CODE 258

## 2021-10-31 PROCEDURE — 71045 X-RAY EXAM CHEST 1 VIEW: CPT

## 2021-10-31 PROCEDURE — 84295 ASSAY OF SERUM SODIUM: CPT

## 2021-10-31 PROCEDURE — 304538 HCHG NG TUBE

## 2021-10-31 PROCEDURE — 84300 ASSAY OF URINE SODIUM: CPT

## 2021-10-31 PROCEDURE — 700111 HCHG RX REV CODE 636 W/ 250 OVERRIDE (IP)

## 2021-10-31 PROCEDURE — 700111 HCHG RX REV CODE 636 W/ 250 OVERRIDE (IP): Performed by: EMERGENCY MEDICINE

## 2021-10-31 PROCEDURE — 83930 ASSAY OF BLOOD OSMOLALITY: CPT

## 2021-10-31 PROCEDURE — 96374 THER/PROPH/DIAG INJ IV PUSH: CPT

## 2021-10-31 PROCEDURE — 02HV33Z INSERTION OF INFUSION DEVICE INTO SUPERIOR VENA CAVA, PERCUTANEOUS APPROACH: ICD-10-PCS | Performed by: INTERNAL MEDICINE

## 2021-10-31 PROCEDURE — 83935 ASSAY OF URINE OSMOLALITY: CPT

## 2021-10-31 PROCEDURE — 94002 VENT MGMT INPAT INIT DAY: CPT

## 2021-10-31 PROCEDURE — 87040 BLOOD CULTURE FOR BACTERIA: CPT | Mod: 91

## 2021-10-31 PROCEDURE — 84484 ASSAY OF TROPONIN QUANT: CPT

## 2021-10-31 PROCEDURE — 84145 PROCALCITONIN (PCT): CPT

## 2021-10-31 PROCEDURE — C1751 CATH, INF, PER/CENT/MIDLINE: HCPCS

## 2021-10-31 PROCEDURE — 700105 HCHG RX REV CODE 258: Performed by: INTERNAL MEDICINE

## 2021-10-31 PROCEDURE — 36415 COLL VENOUS BLD VENIPUNCTURE: CPT

## 2021-10-31 PROCEDURE — 80053 COMPREHEN METABOLIC PANEL: CPT

## 2021-10-31 PROCEDURE — 303105 HCHG CATHETER EXTRA

## 2021-10-31 PROCEDURE — 700101 HCHG RX REV CODE 250

## 2021-10-31 PROCEDURE — 85025 COMPLETE CBC W/AUTO DIFF WBC: CPT

## 2021-10-31 PROCEDURE — 87086 URINE CULTURE/COLONY COUNT: CPT

## 2021-10-31 PROCEDURE — 36556 INSERT NON-TUNNEL CV CATH: CPT

## 2021-10-31 PROCEDURE — 99291 CRITICAL CARE FIRST HOUR: CPT | Mod: 25 | Performed by: INTERNAL MEDICINE

## 2021-10-31 PROCEDURE — 84478 ASSAY OF TRIGLYCERIDES: CPT

## 2021-10-31 PROCEDURE — 700101 HCHG RX REV CODE 250: Performed by: INTERNAL MEDICINE

## 2021-10-31 PROCEDURE — 5A1945Z RESPIRATORY VENTILATION, 24-96 CONSECUTIVE HOURS: ICD-10-PCS | Performed by: INTERNAL MEDICINE

## 2021-10-31 PROCEDURE — 83735 ASSAY OF MAGNESIUM: CPT

## 2021-10-31 PROCEDURE — 36600 WITHDRAWAL OF ARTERIAL BLOOD: CPT

## 2021-10-31 PROCEDURE — 36556 INSERT NON-TUNNEL CV CATH: CPT | Mod: LT | Performed by: INTERNAL MEDICINE

## 2021-10-31 PROCEDURE — 86140 C-REACTIVE PROTEIN: CPT

## 2021-10-31 PROCEDURE — 85730 THROMBOPLASTIN TIME PARTIAL: CPT

## 2021-10-31 PROCEDURE — 700111 HCHG RX REV CODE 636 W/ 250 OVERRIDE (IP): Performed by: INTERNAL MEDICINE

## 2021-10-31 PROCEDURE — 31500 INSERT EMERGENCY AIRWAY: CPT | Performed by: INTERNAL MEDICINE

## 2021-10-31 PROCEDURE — 83605 ASSAY OF LACTIC ACID: CPT | Mod: 91

## 2021-10-31 PROCEDURE — 99292 CRITICAL CARE ADDL 30 MIN: CPT | Mod: 25 | Performed by: INTERNAL MEDICINE

## 2021-10-31 PROCEDURE — 82533 TOTAL CORTISOL: CPT

## 2021-10-31 PROCEDURE — 85610 PROTHROMBIN TIME: CPT

## 2021-10-31 PROCEDURE — 93005 ELECTROCARDIOGRAM TRACING: CPT

## 2021-10-31 PROCEDURE — 85379 FIBRIN DEGRADATION QUANT: CPT

## 2021-10-31 PROCEDURE — 51702 INSERT TEMP BLADDER CATH: CPT

## 2021-10-31 PROCEDURE — 81001 URINALYSIS AUTO W/SCOPE: CPT

## 2021-10-31 PROCEDURE — 700102 HCHG RX REV CODE 250 W/ 637 OVERRIDE(OP): Performed by: INTERNAL MEDICINE

## 2021-10-31 PROCEDURE — 700105 HCHG RX REV CODE 258: Performed by: EMERGENCY MEDICINE

## 2021-10-31 PROCEDURE — 82962 GLUCOSE BLOOD TEST: CPT

## 2021-10-31 PROCEDURE — 82803 BLOOD GASES ANY COMBINATION: CPT

## 2021-10-31 RX ORDER — DEXTROSE MONOHYDRATE 25 G/50ML
50 INJECTION, SOLUTION INTRAVENOUS
Status: DISCONTINUED | OUTPATIENT
Start: 2021-10-31 | End: 2021-11-07 | Stop reason: HOSPADM

## 2021-10-31 RX ORDER — METOPROLOL TARTRATE 100 MG/1
100 TABLET ORAL 2 TIMES DAILY
Status: ON HOLD | COMMUNITY
End: 2021-11-07

## 2021-10-31 RX ORDER — 3% SODIUM CHLORIDE 3 G/100ML
INJECTION, SOLUTION INTRAVENOUS CONTINUOUS
Status: DISCONTINUED | OUTPATIENT
Start: 2021-10-31 | End: 2021-11-01

## 2021-10-31 RX ORDER — NOREPINEPHRINE BITARTRATE 0.03 MG/ML
INJECTION, SOLUTION INTRAVENOUS
Status: COMPLETED
Start: 2021-10-31 | End: 2021-10-31

## 2021-10-31 RX ORDER — SODIUM CHLORIDE, SODIUM LACTATE, POTASSIUM CHLORIDE, AND CALCIUM CHLORIDE .6; .31; .03; .02 G/100ML; G/100ML; G/100ML; G/100ML
30 INJECTION, SOLUTION INTRAVENOUS ONCE
Status: ACTIVE | OUTPATIENT
Start: 2021-10-31 | End: 2021-11-01

## 2021-10-31 RX ORDER — ETOMIDATE 2 MG/ML
40 INJECTION INTRAVENOUS ONCE
Status: COMPLETED | OUTPATIENT
Start: 2021-10-31 | End: 2021-10-31

## 2021-10-31 RX ORDER — HEPARIN SODIUM 5000 [USP'U]/ML
5000 INJECTION, SOLUTION INTRAVENOUS; SUBCUTANEOUS EVERY 8 HOURS
Status: DISCONTINUED | OUTPATIENT
Start: 2021-11-01 | End: 2021-11-07 | Stop reason: HOSPADM

## 2021-10-31 RX ORDER — LISINOPRIL 20 MG/1
20 TABLET ORAL 2 TIMES DAILY
COMMUNITY

## 2021-10-31 RX ORDER — ONDANSETRON 2 MG/ML
4 INJECTION INTRAMUSCULAR; INTRAVENOUS EVERY 6 HOURS PRN
Status: DISCONTINUED | OUTPATIENT
Start: 2021-10-31 | End: 2021-11-07 | Stop reason: HOSPADM

## 2021-10-31 RX ORDER — BISACODYL 10 MG
10 SUPPOSITORY, RECTAL RECTAL
Status: DISCONTINUED | OUTPATIENT
Start: 2021-10-31 | End: 2021-11-04

## 2021-10-31 RX ORDER — POLYETHYLENE GLYCOL 3350 17 G/17G
1 POWDER, FOR SOLUTION ORAL
Status: DISCONTINUED | OUTPATIENT
Start: 2021-10-31 | End: 2021-11-04

## 2021-10-31 RX ORDER — BISACODYL 10 MG
10 SUPPOSITORY, RECTAL RECTAL
Status: DISCONTINUED | OUTPATIENT
Start: 2021-10-31 | End: 2021-10-31

## 2021-10-31 RX ORDER — LORAZEPAM 2 MG/ML
1 INJECTION INTRAMUSCULAR ONCE
Status: COMPLETED | OUTPATIENT
Start: 2021-10-31 | End: 2021-10-31

## 2021-10-31 RX ORDER — NOREPINEPHRINE BITARTRATE 0.03 MG/ML
0-30 INJECTION, SOLUTION INTRAVENOUS CONTINUOUS
Status: DISCONTINUED | OUTPATIENT
Start: 2021-10-31 | End: 2021-11-02

## 2021-10-31 RX ORDER — MIDAZOLAM HYDROCHLORIDE 1 MG/ML
5 INJECTION INTRAMUSCULAR; INTRAVENOUS ONCE
Status: COMPLETED | OUTPATIENT
Start: 2021-10-31 | End: 2021-10-31

## 2021-10-31 RX ORDER — ALBUTEROL SULFATE 2.5 MG/3ML
2.5 SOLUTION RESPIRATORY (INHALATION) EVERY 4 HOURS PRN
COMMUNITY

## 2021-10-31 RX ORDER — ACETAMINOPHEN 325 MG/1
650 TABLET ORAL EVERY 6 HOURS PRN
Status: DISCONTINUED | OUTPATIENT
Start: 2021-10-31 | End: 2021-11-04

## 2021-10-31 RX ORDER — AMOXICILLIN 250 MG
2 CAPSULE ORAL 2 TIMES DAILY
Status: DISCONTINUED | OUTPATIENT
Start: 2021-10-31 | End: 2021-11-04

## 2021-10-31 RX ORDER — IBUPROFEN 800 MG/1
800 TABLET ORAL EVERY 8 HOURS PRN
COMMUNITY
End: 2021-12-13

## 2021-10-31 RX ORDER — HYDRALAZINE HYDROCHLORIDE 20 MG/ML
10 INJECTION INTRAMUSCULAR; INTRAVENOUS EVERY 6 HOURS PRN
Status: DISCONTINUED | OUTPATIENT
Start: 2021-10-31 | End: 2021-11-03

## 2021-10-31 RX ORDER — SODIUM CHLORIDE 9 MG/ML
INJECTION, SOLUTION INTRAVENOUS CONTINUOUS
Status: DISCONTINUED | OUTPATIENT
Start: 2021-10-31 | End: 2021-10-31

## 2021-10-31 RX ORDER — CETIRIZINE HYDROCHLORIDE 10 MG/1
10 TABLET ORAL DAILY
Status: SHIPPED | COMMUNITY
End: 2021-10-31

## 2021-10-31 RX ORDER — ROCURONIUM BROMIDE 10 MG/ML
100 INJECTION, SOLUTION INTRAVENOUS ONCE
Status: COMPLETED | OUTPATIENT
Start: 2021-10-31 | End: 2021-10-31

## 2021-10-31 RX ORDER — ONDANSETRON 4 MG/1
4 TABLET, ORALLY DISINTEGRATING ORAL EVERY 6 HOURS PRN
Status: DISCONTINUED | OUTPATIENT
Start: 2021-10-31 | End: 2021-11-01

## 2021-10-31 RX ORDER — ACETAMINOPHEN 500 MG
1000 TABLET ORAL EVERY 6 HOURS PRN
COMMUNITY
End: 2021-12-13

## 2021-10-31 RX ORDER — VECURONIUM BROMIDE 1 MG/ML
20 INJECTION, POWDER, LYOPHILIZED, FOR SOLUTION INTRAVENOUS ONCE
Status: COMPLETED | OUTPATIENT
Start: 2021-10-31 | End: 2021-10-31

## 2021-10-31 RX ORDER — MIDAZOLAM HYDROCHLORIDE 5 MG/ML
INJECTION INTRAMUSCULAR; INTRAVENOUS
Status: COMPLETED
Start: 2021-10-31 | End: 2021-10-31

## 2021-10-31 RX ORDER — 3% SODIUM CHLORIDE 3 G/100ML
INJECTION, SOLUTION INTRAVENOUS
Status: COMPLETED
Start: 2021-10-31 | End: 2021-11-01

## 2021-10-31 RX ORDER — POLYETHYLENE GLYCOL 3350 17 G/17G
1 POWDER, FOR SOLUTION ORAL
Status: DISCONTINUED | OUTPATIENT
Start: 2021-10-31 | End: 2021-10-31

## 2021-10-31 RX ORDER — FAMOTIDINE 20 MG/1
20 TABLET, FILM COATED ORAL EVERY 12 HOURS
Status: DISCONTINUED | OUTPATIENT
Start: 2021-10-31 | End: 2021-11-03

## 2021-10-31 RX ORDER — AMOXICILLIN 250 MG
2 CAPSULE ORAL 2 TIMES DAILY
Status: DISCONTINUED | OUTPATIENT
Start: 2021-10-31 | End: 2021-10-31

## 2021-10-31 RX ADMIN — FAMOTIDINE 20 MG: 10 INJECTION INTRAVENOUS at 21:23

## 2021-10-31 RX ADMIN — ETOMIDATE 40 MG: 2 INJECTION INTRAVENOUS at 18:45

## 2021-10-31 RX ADMIN — LORAZEPAM 1 MG: 2 INJECTION INTRAMUSCULAR; INTRAVENOUS at 17:45

## 2021-10-31 RX ADMIN — NOREPINEPHRINE BITARTRATE 2 MCG/MIN: 1 INJECTION, SOLUTION, CONCENTRATE INTRAVENOUS at 19:50

## 2021-10-31 RX ADMIN — INSULIN HUMAN 4 UNITS: 100 INJECTION, SOLUTION PARENTERAL at 21:40

## 2021-10-31 RX ADMIN — VECURONIUM BROMIDE 20 MG: 10 INJECTION, POWDER, LYOPHILIZED, FOR SOLUTION INTRAVENOUS at 18:40

## 2021-10-31 RX ADMIN — PROPOFOL 40 MCG/KG/MIN: 10 INJECTION, EMULSION INTRAVENOUS at 19:00

## 2021-10-31 RX ADMIN — MIDAZOLAM HYDROCHLORIDE 5 MG: 1 INJECTION, SOLUTION INTRAMUSCULAR; INTRAVENOUS at 18:45

## 2021-10-31 RX ADMIN — ROCURONIUM BROMIDE 100 MG: 10 INJECTION, SOLUTION INTRAVENOUS at 19:45

## 2021-10-31 RX ADMIN — LORAZEPAM 1 MG: 2 INJECTION INTRAMUSCULAR; INTRAVENOUS at 18:15

## 2021-10-31 RX ADMIN — HYDROCORTISONE SODIUM SUCCINATE 100 MG: 100 INJECTION, POWDER, FOR SOLUTION INTRAMUSCULAR; INTRAVENOUS at 21:19

## 2021-10-31 RX ADMIN — Medication 2500 MCG: at 21:37

## 2021-10-31 RX ADMIN — SODIUM CHLORIDE: 9 INJECTION, SOLUTION INTRAVENOUS at 17:45

## 2021-10-31 RX ADMIN — SODIUM CHLORIDE: 9 INJECTION, SOLUTION INTRAVENOUS at 19:45

## 2021-10-31 ASSESSMENT — COGNITIVE AND FUNCTIONAL STATUS - GENERAL
MOBILITY SCORE: 24
DAILY ACTIVITIY SCORE: 24
SUGGESTED CMS G CODE MODIFIER DAILY ACTIVITY: CH
SUGGESTED CMS G CODE MODIFIER MOBILITY: CH

## 2021-10-31 ASSESSMENT — PAIN DESCRIPTION - PAIN TYPE
TYPE: ACUTE PAIN
TYPE: ACUTE PAIN

## 2021-10-31 ASSESSMENT — FIBROSIS 4 INDEX
FIB4 SCORE: 1.91
FIB4 SCORE: 1.91

## 2021-10-31 ASSESSMENT — LIFESTYLE VARIABLES
AVERAGE NUMBER OF DAYS PER WEEK YOU HAVE A DRINK CONTAINING ALCOHOL: 1
ALCOHOL_USE: YES
CONSUMPTION TOTAL: INCOMPLETE

## 2021-11-01 ENCOUNTER — APPOINTMENT (OUTPATIENT)
Dept: RADIOLOGY | Facility: MEDICAL CENTER | Age: 46
DRG: 871 | End: 2021-11-01
Attending: INTERNAL MEDICINE
Payer: COMMERCIAL

## 2021-11-01 PROBLEM — R65.21 SEPTIC SHOCK (HCC): Status: ACTIVE | Noted: 2021-11-01

## 2021-11-01 PROBLEM — A41.9 SEPTIC SHOCK (HCC): Status: ACTIVE | Noted: 2021-11-01

## 2021-11-01 PROBLEM — J96.01 ACUTE HYPOXEMIC RESPIRATORY FAILURE DUE TO COVID-19 (HCC): Status: ACTIVE | Noted: 2021-10-31

## 2021-11-01 PROBLEM — R65.21 SEPTIC SHOCK (HCC): Status: ACTIVE | Noted: 2021-10-31

## 2021-11-01 PROBLEM — A41.9 SEPTIC SHOCK (HCC): Status: RESOLVED | Noted: 2021-10-31 | Resolved: 2021-11-01

## 2021-11-01 PROBLEM — R65.21 SEPTIC SHOCK (HCC): Status: RESOLVED | Noted: 2021-10-31 | Resolved: 2021-11-01

## 2021-11-01 PROBLEM — A41.9 SEPTIC SHOCK (HCC): Status: ACTIVE | Noted: 2021-10-31

## 2021-11-01 LAB
ALBUMIN SERPL BCP-MCNC: 1.8 G/DL (ref 3.2–4.9)
ALBUMIN/GLOB SERPL: 0.5 G/DL
ALP SERPL-CCNC: 78 U/L (ref 30–99)
ALT SERPL-CCNC: 13 U/L (ref 2–50)
ANION GAP SERPL CALC-SCNC: 12 MMOL/L (ref 7–16)
ANION GAP SERPL CALC-SCNC: 12 MMOL/L (ref 7–16)
ANION GAP SERPL CALC-SCNC: 8 MMOL/L (ref 7–16)
ANION GAP SERPL CALC-SCNC: 8 MMOL/L (ref 7–16)
AST SERPL-CCNC: 28 U/L (ref 12–45)
BASE EXCESS BLDA CALC-SCNC: 1 MMOL/L (ref -4–3)
BASOPHILS # BLD AUTO: 0.1 % (ref 0–1.8)
BASOPHILS # BLD: 0.01 K/UL (ref 0–0.12)
BILIRUB SERPL-MCNC: 0.7 MG/DL (ref 0.1–1.5)
BODY TEMPERATURE: ABNORMAL DEGREES
BUN SERPL-MCNC: 16 MG/DL (ref 8–22)
BUN SERPL-MCNC: 18 MG/DL (ref 8–22)
CA-I SERPL-SCNC: 0.98 MMOL/L (ref 1.1–1.3)
CALCIUM SERPL-MCNC: 6.9 MG/DL (ref 8.4–10.2)
CALCIUM SERPL-MCNC: 7.5 MG/DL (ref 8.4–10.2)
CALCIUM SERPL-MCNC: 8 MG/DL (ref 8.4–10.2)
CALCIUM SERPL-MCNC: 8.1 MG/DL (ref 8.4–10.2)
CHLORIDE SERPL-SCNC: 84 MMOL/L (ref 96–112)
CHLORIDE SERPL-SCNC: 85 MMOL/L (ref 96–112)
CHLORIDE SERPL-SCNC: 91 MMOL/L (ref 96–112)
CHLORIDE SERPL-SCNC: 94 MMOL/L (ref 96–112)
CO2 BLDA-SCNC: 33 MMOL/L (ref 20–33)
CO2 SERPL-SCNC: 23 MMOL/L (ref 20–33)
CO2 SERPL-SCNC: 25 MMOL/L (ref 20–33)
CO2 SERPL-SCNC: 27 MMOL/L (ref 20–33)
CO2 SERPL-SCNC: 29 MMOL/L (ref 20–33)
CREAT SERPL-MCNC: 0.96 MG/DL (ref 0.5–1.4)
CREAT SERPL-MCNC: 1.05 MG/DL (ref 0.5–1.4)
CREAT SERPL-MCNC: 1.13 MG/DL (ref 0.5–1.4)
CREAT SERPL-MCNC: 1.17 MG/DL (ref 0.5–1.4)
CRP SERPL HS-MCNC: 11.32 MG/DL (ref 0–0.75)
CRP SERPL HS-MCNC: 13.8 MG/DL (ref 0–0.75)
EOSINOPHIL # BLD AUTO: 0 K/UL (ref 0–0.51)
EOSINOPHIL NFR BLD: 0 % (ref 0–6.9)
ERYTHROCYTE [DISTWIDTH] IN BLOOD BY AUTOMATED COUNT: 51.5 FL (ref 35.9–50)
GLOBULIN SER CALC-MCNC: 3.6 G/DL (ref 1.9–3.5)
GLUCOSE BLD-MCNC: 171 MG/DL (ref 65–99)
GLUCOSE BLD-MCNC: 174 MG/DL (ref 65–99)
GLUCOSE BLD-MCNC: 176 MG/DL (ref 65–99)
GLUCOSE BLD-MCNC: 195 MG/DL (ref 65–99)
GLUCOSE SERPL-MCNC: 177 MG/DL (ref 65–99)
GLUCOSE SERPL-MCNC: 192 MG/DL (ref 65–99)
GLUCOSE SERPL-MCNC: 195 MG/DL (ref 65–99)
GLUCOSE SERPL-MCNC: 206 MG/DL (ref 65–99)
HAV IGM SERPL QL IA: NORMAL
HBV CORE IGM SER QL: NORMAL
HBV SURFACE AG SER QL: NORMAL
HCO3 BLDA-SCNC: 30.9 MMOL/L (ref 17–25)
HCT VFR BLD AUTO: 53.5 % (ref 37–47)
HCV AB SER QL: NORMAL
HGB BLD-MCNC: 15.1 G/DL (ref 12–16)
IMM GRANULOCYTES # BLD AUTO: 0.06 K/UL (ref 0–0.11)
IMM GRANULOCYTES NFR BLD AUTO: 0.6 % (ref 0–0.9)
LACTATE BLD-SCNC: 0.9 MMOL/L (ref 0.5–2)
LACTATE BLD-SCNC: 1.6 MMOL/L (ref 0.5–2)
LYMPHOCYTES # BLD AUTO: 0.58 K/UL (ref 1–4.8)
LYMPHOCYTES NFR BLD: 5.3 % (ref 22–41)
MAGNESIUM SERPL-MCNC: 1.6 MG/DL (ref 1.5–2.5)
MCH RBC QN AUTO: 22 PG (ref 27–33)
MCHC RBC AUTO-ENTMCNC: 28.2 G/DL (ref 33.6–35)
MCV RBC AUTO: 78.1 FL (ref 81.4–97.8)
MONOCYTES # BLD AUTO: 0.83 K/UL (ref 0–0.85)
MONOCYTES NFR BLD AUTO: 7.6 % (ref 0–13.4)
NEUTROPHILS # BLD AUTO: 9.38 K/UL (ref 2–7.15)
NEUTROPHILS NFR BLD: 86.4 % (ref 44–72)
NRBC # BLD AUTO: 0 K/UL
NRBC BLD-RTO: 0 /100 WBC
OSMOLALITY SERPL: 257 MOSM/KG H2O (ref 278–298)
OSMOLALITY UR: 311 MOSM/KG H2O (ref 300–900)
PCO2 BLDA: 67.4 MMHG (ref 26–37)
PH BLDA: 7.27 [PH] (ref 7.4–7.5)
PH TEMP ADJ BLDA: 7.27 [PH] (ref 7.4–7.5)
PHOSPHATE SERPL-MCNC: 4.4 MG/DL (ref 2.5–4.5)
PLATELET # BLD AUTO: 268 K/UL (ref 164–446)
PMV BLD AUTO: 10.8 FL (ref 9–12.9)
PO2 BLDA: 127 MMHG (ref 64–87)
PO2 TEMP ADJ BLDA: 127 MMHG (ref 64–87)
POTASSIUM SERPL-SCNC: 4.9 MMOL/L (ref 3.6–5.5)
POTASSIUM SERPL-SCNC: 5 MMOL/L (ref 3.6–5.5)
POTASSIUM SERPL-SCNC: 5 MMOL/L (ref 3.6–5.5)
POTASSIUM SERPL-SCNC: 5.3 MMOL/L (ref 3.6–5.5)
PREALB SERPL-MCNC: 4.4 MG/DL (ref 18–38)
PROT SERPL-MCNC: 5.4 G/DL (ref 6–8.2)
RBC # BLD AUTO: 6.85 M/UL (ref 4.2–5.4)
SAO2 % BLDA: 98 % (ref 93–99)
SODIUM SERPL-SCNC: 119 MMOL/L (ref 135–145)
SODIUM SERPL-SCNC: 119 MMOL/L (ref 135–145)
SODIUM SERPL-SCNC: 120 MMOL/L (ref 135–145)
SODIUM SERPL-SCNC: 128 MMOL/L (ref 135–145)
SODIUM SERPL-SCNC: 131 MMOL/L (ref 135–145)
SODIUM UR-SCNC: <20 MMOL/L
SPECIMEN DRAWN FROM PATIENT: ABNORMAL
WBC # BLD AUTO: 10.9 K/UL (ref 4.8–10.8)

## 2021-11-01 PROCEDURE — 84295 ASSAY OF SERUM SODIUM: CPT

## 2021-11-01 PROCEDURE — 80048 BASIC METABOLIC PNL TOTAL CA: CPT | Mod: 91

## 2021-11-01 PROCEDURE — 36592 COLLECT BLOOD FROM PICC: CPT

## 2021-11-01 PROCEDURE — 86140 C-REACTIVE PROTEIN: CPT

## 2021-11-01 PROCEDURE — 82962 GLUCOSE BLOOD TEST: CPT

## 2021-11-01 PROCEDURE — 700105 HCHG RX REV CODE 258

## 2021-11-01 PROCEDURE — 700105 HCHG RX REV CODE 258: Performed by: INTERNAL MEDICINE

## 2021-11-01 PROCEDURE — 85025 COMPLETE CBC W/AUTO DIFF WBC: CPT

## 2021-11-01 PROCEDURE — 94003 VENT MGMT INPAT SUBQ DAY: CPT

## 2021-11-01 PROCEDURE — 84100 ASSAY OF PHOSPHORUS: CPT

## 2021-11-01 PROCEDURE — 80074 ACUTE HEPATITIS PANEL: CPT

## 2021-11-01 PROCEDURE — 86480 TB TEST CELL IMMUN MEASURE: CPT

## 2021-11-01 PROCEDURE — 83605 ASSAY OF LACTIC ACID: CPT | Mod: 91

## 2021-11-01 PROCEDURE — 82803 BLOOD GASES ANY COMBINATION: CPT

## 2021-11-01 PROCEDURE — 770022 HCHG ROOM/CARE - ICU (200)

## 2021-11-01 PROCEDURE — 700101 HCHG RX REV CODE 250: Performed by: INTERNAL MEDICINE

## 2021-11-01 PROCEDURE — 80053 COMPREHEN METABOLIC PANEL: CPT

## 2021-11-01 PROCEDURE — 700111 HCHG RX REV CODE 636 W/ 250 OVERRIDE (IP): Performed by: INTERNAL MEDICINE

## 2021-11-01 PROCEDURE — 700102 HCHG RX REV CODE 250 W/ 637 OVERRIDE(OP): Performed by: INTERNAL MEDICINE

## 2021-11-01 PROCEDURE — 83735 ASSAY OF MAGNESIUM: CPT

## 2021-11-01 PROCEDURE — 94760 N-INVAS EAR/PLS OXIMETRY 1: CPT

## 2021-11-01 PROCEDURE — 99291 CRITICAL CARE FIRST HOUR: CPT | Performed by: INTERNAL MEDICINE

## 2021-11-01 PROCEDURE — A9270 NON-COVERED ITEM OR SERVICE: HCPCS | Performed by: INTERNAL MEDICINE

## 2021-11-01 PROCEDURE — 82330 ASSAY OF CALCIUM: CPT

## 2021-11-01 PROCEDURE — XW033H5 INTRODUCTION OF TOCILIZUMAB INTO PERIPHERAL VEIN, PERCUTANEOUS APPROACH, NEW TECHNOLOGY GROUP 5: ICD-10-PCS | Performed by: INTERNAL MEDICINE

## 2021-11-01 PROCEDURE — 84134 ASSAY OF PREALBUMIN: CPT

## 2021-11-01 PROCEDURE — 71045 X-RAY EXAM CHEST 1 VIEW: CPT

## 2021-11-01 RX ORDER — CALCIUM GLUCONATE 20 MG/ML
2 INJECTION, SOLUTION INTRAVENOUS ONCE
Status: COMPLETED | OUTPATIENT
Start: 2021-11-01 | End: 2021-11-01

## 2021-11-01 RX ORDER — DEXTROSE MONOHYDRATE 50 MG/ML
INJECTION, SOLUTION INTRAVENOUS CONTINUOUS
Status: DISCONTINUED | OUTPATIENT
Start: 2021-11-01 | End: 2021-11-03

## 2021-11-01 RX ORDER — MAGNESIUM SULFATE HEPTAHYDRATE 40 MG/ML
2 INJECTION, SOLUTION INTRAVENOUS ONCE
Status: COMPLETED | OUTPATIENT
Start: 2021-11-01 | End: 2021-11-01

## 2021-11-01 RX ORDER — ONDANSETRON 4 MG/1
4 TABLET, ORALLY DISINTEGRATING ORAL EVERY 6 HOURS PRN
Status: DISCONTINUED | OUTPATIENT
Start: 2021-11-01 | End: 2021-11-04

## 2021-11-01 RX ADMIN — INSULIN HUMAN 3 UNITS: 100 INJECTION, SOLUTION PARENTERAL at 17:50

## 2021-11-01 RX ADMIN — HYDROCORTISONE SODIUM SUCCINATE 100 MG: 100 INJECTION, POWDER, FOR SOLUTION INTRAMUSCULAR; INTRAVENOUS at 13:59

## 2021-11-01 RX ADMIN — INSULIN HUMAN 3 UNITS: 100 INJECTION, SOLUTION PARENTERAL at 05:11

## 2021-11-01 RX ADMIN — Medication 250 MCG/HR: at 14:00

## 2021-11-01 RX ADMIN — FAMOTIDINE 20 MG: 10 INJECTION INTRAVENOUS at 05:06

## 2021-11-01 RX ADMIN — SENNOSIDES AND DOCUSATE SODIUM 2 TABLET: 50; 8.6 TABLET ORAL at 05:06

## 2021-11-01 RX ADMIN — PROPOFOL 30 MCG/KG/MIN: 10 INJECTION, EMULSION INTRAVENOUS at 08:50

## 2021-11-01 RX ADMIN — INSULIN HUMAN 3 UNITS: 100 INJECTION, SOLUTION PARENTERAL at 01:20

## 2021-11-01 RX ADMIN — HYDROCORTISONE SODIUM SUCCINATE 100 MG: 100 INJECTION, POWDER, FOR SOLUTION INTRAMUSCULAR; INTRAVENOUS at 21:10

## 2021-11-01 RX ADMIN — CALCIUM GLUCONATE 2 G: 20 INJECTION, SOLUTION INTRAVENOUS at 10:52

## 2021-11-01 RX ADMIN — HEPARIN SODIUM 5000 UNITS: 5000 INJECTION, SOLUTION INTRAVENOUS; SUBCUTANEOUS at 05:38

## 2021-11-01 RX ADMIN — DEXTROSE MONOHYDRATE: 50 INJECTION, SOLUTION INTRAVENOUS at 19:38

## 2021-11-01 RX ADMIN — Medication 2500 MCG: at 17:16

## 2021-11-01 RX ADMIN — HYDROCORTISONE SODIUM SUCCINATE 100 MG: 100 INJECTION, POWDER, FOR SOLUTION INTRAMUSCULAR; INTRAVENOUS at 05:07

## 2021-11-01 RX ADMIN — FAMOTIDINE 20 MG: 10 INJECTION INTRAVENOUS at 17:27

## 2021-11-01 RX ADMIN — PROPOFOL 35.05 MCG/KG/MIN: 10 INJECTION, EMULSION INTRAVENOUS at 01:17

## 2021-11-01 RX ADMIN — PROPOFOL 35 MCG/KG/MIN: 10 INJECTION, EMULSION INTRAVENOUS at 04:48

## 2021-11-01 RX ADMIN — SODIUM CHLORIDE: 3 INJECTION, SOLUTION INTRAVENOUS at 00:42

## 2021-11-01 RX ADMIN — SODIUM CHLORIDE: 3 INJECTION, SOLUTION INTRAVENOUS at 12:57

## 2021-11-01 RX ADMIN — INSULIN HUMAN 3 UNITS: 100 INJECTION, SOLUTION PARENTERAL at 11:26

## 2021-11-01 RX ADMIN — DEXMEDETOMIDINE HYDROCHLORIDE 0.6 MCG/KG/HR: 100 INJECTION, SOLUTION, CONCENTRATE INTRAVENOUS at 16:13

## 2021-11-01 RX ADMIN — MAGNESIUM SULFATE 2 G: 2 INJECTION INTRAVENOUS at 07:55

## 2021-11-01 RX ADMIN — INSULIN HUMAN 3 UNITS: 100 INJECTION, SOLUTION PARENTERAL at 23:50

## 2021-11-01 RX ADMIN — HEPARIN SODIUM 5000 UNITS: 5000 INJECTION, SOLUTION INTRAVENOUS; SUBCUTANEOUS at 21:10

## 2021-11-01 RX ADMIN — Medication 200 MCG: at 10:24

## 2021-11-01 RX ADMIN — HEPARIN SODIUM 5000 UNITS: 5000 INJECTION, SOLUTION INTRAVENOUS; SUBCUTANEOUS at 13:58

## 2021-11-01 RX ADMIN — NOREPINEPHRINE BITARTRATE 5 MCG/MIN: 1 INJECTION, SOLUTION, CONCENTRATE INTRAVENOUS at 19:39

## 2021-11-01 RX ADMIN — SENNOSIDES AND DOCUSATE SODIUM 2 TABLET: 50; 8.6 TABLET ORAL at 17:27

## 2021-11-01 RX ADMIN — TOCILIZUMAB 400 MG: 20 INJECTION, SOLUTION, CONCENTRATE INTRAVENOUS at 11:34

## 2021-11-01 RX ADMIN — PROPOFOL 10 MCG/KG/MIN: 10 INJECTION, EMULSION INTRAVENOUS at 13:59

## 2021-11-01 RX ADMIN — DEXMEDETOMIDINE HYDROCHLORIDE 0.2 MCG/KG/HR: 100 INJECTION, SOLUTION, CONCENTRATE INTRAVENOUS at 11:32

## 2021-11-01 RX ADMIN — Medication 300 MCG/HR: at 21:18

## 2021-11-01 ASSESSMENT — PAIN DESCRIPTION - PAIN TYPE
TYPE: ACUTE PAIN

## 2021-11-01 NOTE — DIETARY
"Nutrition Support Assessment   Day 1 of admit.  Adri Maldonado is a 46 y.o. female with admitting DX of Acute respiratory failure due to COVID-19     Current problem list:  1. Arterial hypotension  2. Hyponatremia  3. Encephalopathy  4. Acute respiratory failure due to COVID 19  5. Polycythemia vera     Assessment:  Estimated Nutritional Needs: based on:   Height: 165.1 cm (5' 5\")  Weight: (!) 139 kg (305 lb 8.9 oz)(bed scale)  Body mass index is 50.85 kg/m²., BMI classification: class 3 obesity  Ideal body weight (IBW): 56.8 kg (125 lb)    Calculation/Equation:  PSU (EMV=10 L/min, Tmax=37.5 C) MSO=7298 kcals   MSJ: REE=2028 x 1.1-1.3=2231-2636 kcals    - 4554-4963 kcals (11-14 kcals/kg)    - >/= 142 gms protein (>/=2.5 gms/kg protein IBW)     Evaluation:   1. Pt with tube feed consult due to sedation and intubation (10/31). Pt is currently NPO.   2. RD spoke with pt's nurse. She currently has an OG tube.  3. Pt with BMI >40 (=Body mass index is 50.85 kg/m².), Class III obesity. Weight loss counseling not appropriate in acute care setting.   4. Consult noted for indirect metabolic cart study. Based on pt's current dx of COVID-19, this will not be utilized.   5. Labs: 11/1/21: sodium=120 (L), glucose=177 (H). Glucose accu-chk: 171-215   6. Meds: Solu-Cortef, SSI, Precedex, Levophed @ 6 mcg/min, propofol @ 28.6 mL/hour providing 755 kcals per day  7. Peptamen Intense VHP at a goal rate of 65 mL/hour will provide 1560 kcals (2315 kcals w/ propofol), 144 gms of protein and 1310 mL of free water. This formula is appropriate for this pt while on or off propofol.Based on guidelines for critically ill pt with COVID-19, tube feed should be initiated at a low rate and advanced slowly.  Pt with low sodium. Although Peptamen Intense VHP is not a  concentrated formula, it is appropriate. With low initiation and slow advancement, tube feed will not reach goal rate for 5 to 6 days. This will limit fluid. Of note, more " concentrated formula, Impact Peptide 1.5, would have to be provided at higher rate to meet pt's elevated protein needs. At this higher rate, this formula will only provide ~ 100 mL less fluid than Peptamen Intense VHP.      Malnutrition Risk: unable to determine     Recommendations/Plan:  1. Initiate Peptamen Intense VHP at 10 mL/hour and advance slowly by 10 mL per day to goal rate of 65 ml/hour.  2. Additional fluid per MD  3. Monitor weights  4. If appropriate at DC please refer to outpatient nutrition services for weight management.      RD will monitor.

## 2021-11-01 NOTE — ED NOTES
Med rec updated and complete  Allergies reviewed. Per vickie  Pt not able to tell me her medications, spoke with pts  (Chuck) [phone] to verify when pt took her medications last.  Pt had RX bottles at bedside,went over with  on the phone.  Per  reports that pt has not been taking her LASIX, HCTZ, KLOR-CON , and CETIRIZINE in the last 30 days or more.  Pts husabnd reports no vitamins   Pts  reports no antibiotics in the last 30 days.    No current facility-administered medications on file prior to encounter.     Current Outpatient Medications on File Prior to Encounter   Medication Sig Dispense Refill   • albuterol (PROVENTIL) 2.5mg/3ml Nebu Soln solution for nebulization Take 2.5 mg by nebulization every four hours as needed for Shortness of Breath.     • fluticasone-salmeterol (ADVAIR) 250-50 MCG/DOSE AEROSOL POWDER, BREATH ACTIVATED Inhale 1 Puff every 12 hours.     • aspirin EC (ECOTRIN) 81 MG Tablet Delayed Response Take 81 mg by mouth every day.     • ibuprofen (MOTRIN) 800 MG Tab Take 800 mg by mouth every 8 hours as needed for Moderate Pain.     • acetaminophen (TYLENOL) 500 MG Tab Take 1,000 mg by mouth every 6 hours as needed for Moderate Pain.     • lisinopril (PRINIVIL) 20 MG Tab Take 20 mg by mouth 2 times a day.     • metoprolol tartrate (LOPRESSOR) 100 MG Tab Take 100 mg by mouth 2 times a day.     • felodipine (PLENDIL) 10 MG TABLET SR 24 HR Take 10 mg by mouth every day.     • albuterol 108 (90 Base) MCG/ACT Aero Soln inhalation aerosol Inhale 2 Puffs by mouth every 6 hours as needed for Shortness of Breath. 8.5 g 1

## 2021-11-01 NOTE — PROCEDURES
Intubation    Date/Time: 10/31/2021 10:46 PM  Performed by: Alan Rios M.D.  Authorized by: Alan Rios M.D.     Consent:     Consent obtained:  Emergent situation    Alternatives discussed:  No treatment, delayed treatment and alternative treatment  Pre-procedure details:     Patient status:  Altered mental status    Mallampati score:  IV    Pretreatment medications:  Midazolam (etomidate 40 mg , vecuronium 10 mg followed by rsoalinda 100 mg)    Paralytics:  Vecuronium and rocuronium  Procedure details:     Preoxygenation:  Bag valve mask (Initially bag valve mask then LMA)    CPR in progress: no      Intubation method:  Oral    Oral intubation technique:  Video-assisted    Laryngoscope type:  GlideScope    Laryngoscope blade:  Mac 4    Tube size (mm):  6.0    Tube type:  Cuffed    Number of attempts:  3 or more    Ventilation between attempts: yes      Cricoid pressure: yes      Tube visualized through cords: yes    Placement assessment:     ETT to lip:  28    Tube secured with:  Adhesive tape and ETT rose    Breath sounds:  Equal    Placement verification: chest rise, CXR verification, equal breath sounds and ETCO2 detector    Post-procedure details:     Patient tolerance of procedure:  Tolerated with difficulty  Comments:      Pt extremely difficult intubation due to marked macroglossia  Initially tried with 7.5 ET tube but unable to see vocal cord  ERP DR. Lewis tried and unable to see the cords  LMA placed   Finally with aide of DR. Lewis who held the tongue out able to pass a 6.0 ET tube under direct visualization with glidescope  Very tight fit of ET tube  CXR shows good placement of ET tube

## 2021-11-01 NOTE — CARE PLAN
The patient is Unstable - High likelihood or risk of patient condition declining or worsening    Shift Goals  Clinical Goals: decrease o2 demand     Progress made toward(s) clinical / shift goals:        Problem: Pain - Standard  Goal: Alleviation of pain or a reduction in pain to the patient’s comfort goal  Outcome: Progressing  See emar for medication ordered.      Problem: Fall Risk  Goal: Patient will remain free from falls  Outcome: Progressing  Bed locked in lowest position.        Patient is not progressing towards the following goals:

## 2021-11-01 NOTE — ASSESSMENT & PLAN NOTE
This is Septic shock Present on admission  SIRS criteria identified on my evaluation include: Fever, with temperature greater than 101 deg F and Tachypnea, with respirations greater than 20 per minute  Source is COVID pneumonia  Presentation includes: Severe sepsis present and persistent hypotension after 30 ml/kg completed.   Despite appropriate fluid resuscitation with crystalloid given per sepsis guidelines, the patient remains hypotensive with systolic blood pressure less than 90 or MAP less than 65  Hemodynamic support with additional fluids and IV vasopressors as needed to maintain a SBP of 90 or MAP of 65  IV antibiotics as appropriate for source of sepsis  Reassessment: I have reassessed the patient's hemodynamic status    Septic shock 2/2 covid, improving, off pressors  TTE 10/18: mild pHTN, EF 65%  Cont precedex, fentanyl, minimize propofol

## 2021-11-01 NOTE — ED NOTES
Pt BIB [facility]  + Rapid COVID yesterday  Sudden onset of confusion after a shower today, family checked pulse ox 72% on home 3L    Pt verbal but not following commands, trashing  PIV placed, labs collected

## 2021-11-01 NOTE — ASSESSMENT & PLAN NOTE
Acute on chronic hypoxemic respiratory failure  Intubated 10/31/21, 6.0 ETT, challenging intubation  Self extubated 11/2 despite restraints and adequate sedation  Doing well on aerosol mask, cont to wean FiO2 to maintain sats low 90s  Cont decadron 6mg PO QD for total 10 days  s/p tocilimuzab 11/1/2021   Cont lasix 20mg IV BID  Refusing proning despite extensive education and counseling by RN, RT, MD  Cont PT/OT/SLP  Strict droplet, contact and  Eye protection

## 2021-11-01 NOTE — ED PROVIDER NOTES
ED Provider Note     Scribed for Samantha Chow D.O. by Mario Montelongo. 10/31/2021, 5:15 PM.     Primary care provider: Lorna Desir P.A.-C.  Means of arrival: EMS         History obtained from: Nurse  History limited by: None    CHIEF COMPLAINT  Chief Complaint   Patient presents with   • Respiratory Distress   • ALOC       HPI  Adri Maldonado is a 46 y.o. female with a history of asthma and congestive heart failure who presents to the emergency Department via EMS for acute altered level of consciousness and severe respiratory distress onset earlier today. Per nurse, the patient had a rapid COVID test yesterday which came back positive. Today, she had a sudden onset of altered level of consciousness after a shower today. Her family checked her pulse ox which was 72%. She is on 3 liters at home. Her blood sugar was 117. Per family member, she will say words but is difficult to understand. Denies fever. No alleviating or exacerbating factors reported.  No history of head trauma.    REVIEW OF SYSTEMS  Pertinent positives include respiratory distress and altered level of consciousness. Pertinent negatives include no fever.   See HPI for further details. All other systems are negative.    PAST MEDICAL HISTORY  Past Medical History:   Diagnosis Date   • ASTHMA      FAMILY HISTORY  No family history noted.    SOCIAL HISTORY  Social History     Tobacco Use   • Smoking status: Former Smoker     Years: 9.00     Types: Cigarettes   • Smokeless tobacco: Never Used   Vaping Use   • Vaping Use: Never used   Substance Use Topics   • Alcohol use: Not Currently     Comment: occ   • Drug use: No      Social History     Substance and Sexual Activity   Drug Use No       SURGICAL HISTORY  No past surgical history noted.    CURRENT MEDICATIONS    Current Facility-Administered Medications:   •  lactated ringers infusion (BOLUS): BMI less than or equal to 30, 30 mL/kg, Intravenous, Once, Samantha Chow D.O.  •  propofol (DIPRIVAN)  injection, 0-80 mcg/kg/min, Intravenous, Continuous, Last Rate: 32.6 mL/hr at 10/31/21 1900, 40 mcg/kg/min at 10/31/21 1900 **AND** Triglycerides Starting now and then Every 3 Days, , , Every 3 Days (0300), Alan Rios M.D.  •  [COMPLETED] rocuronium (ZEMURON) injection 100 mg, 100 mg, Intravenous, Once, Alan Rios M.D., 100 mg at 10/31/21 1945  •  ondansetron (ZOFRAN) syringe/vial injection 4 mg, 4 mg, Intravenous, Q6HRS PRN, Alan Rios M.D.  •  ondansetron (ZOFRAN ODT) dispertab 4 mg, 4 mg, Oral, Q6HRS PRN, Alan Rios M.D.  •  hydrALAZINE (APRESOLINE) injection 10 mg, 10 mg, Intravenous, Q6HRS PRN, Alan Rios M.D.  •  senna-docusate (PERICOLACE or SENOKOT S) 8.6-50 MG per tablet 2 Tablet, 2 Tablet, Oral, BID **AND** polyethylene glycol/lytes (MIRALAX) PACKET 1 Packet, 1 Packet, Oral, QDAY PRN **AND** magnesium hydroxide (MILK OF MAGNESIA) suspension 30 mL, 30 mL, Oral, QDAY PRN **AND** bisacodyl (DULCOLAX) suppository 10 mg, 10 mg, Rectal, QDAY PRN, Alan Rios M.D.  •  Respiratory Therapy Consult, , Nebulization, Continuous RT, Alan Rios M.D.  •  NS infusion, , Intravenous, Continuous, Alan Rios M.D.  •  heparin injection 5,000 Units, 5,000 Units, Subcutaneous, Q8HRS, Alan Rios M.D.  •  acetaminophen (Tylenol) tablet 650 mg, 650 mg, Oral, Q6HRS PRN, Phillips Madhani-Blanquita, M.D.  •  insulin regular (HumuLIN R,NovoLIN R) injection, 3-14 Units, Subcutaneous, Q6HRS **AND** POC blood glucose manual result, , , Q6H **AND** NOTIFY MD and PharmD, , , Once **AND** glucose 4 g chewable tablet 16 g, 16 g, Oral, Q15 MIN PRN **AND** dextrose 50% (D50W) injection 50 mL, 50 mL, Intravenous, Q15 MIN PRN, Alan Rios M.D.  •  Respiratory Therapy Consult, , Nebulization, Continuous RT, Alan Rios M.D.  •  famotidine (PEPCID) tablet 20 mg, 20 mg, Enteral Tube, Q12HRS **OR** famotidine (PEPCID) injection 20 mg, 20  mg, Intravenous, Q12HRS, Alan Rios M.D.  •  senna-docusate (PERICOLACE or SENOKOT S) 8.6-50 MG per tablet 2 Tablet, 2 Tablet, Enteral Tube, BID **AND** polyethylene glycol/lytes (MIRALAX) PACKET 1 Packet, 1 Packet, Enteral Tube, QDAY PRN **AND** magnesium hydroxide (MILK OF MAGNESIA) suspension 30 mL, 30 mL, Enteral Tube, QDAY PRN **AND** bisacodyl (DULCOLAX) suppository 10 mg, 10 mg, Rectal, QDAY PRN, Alan Rios M.D.  •  MD Alert...ICU Electrolyte Replacement per Pharmacy, , Other, PHARMACY TO DOSE, Alan Rios M.D.  •  lidocaine (XYLOCAINE) 1 % injection 2 mL, 2 mL, Tracheal Tube, Q30 MIN PRN, Alan Rios M.D.  •  propofol (DIPRIVAN) injection, 0-80 mcg/kg/min, Intravenous, Continuous **AND** Triglycerides Starting now and then Every 3 Days, , , Every 3 Days (0300), Alan Rios M.D.  •  fentaNYL (SUBLIMAZE) 50 mcg/mL in 50mL, , Intravenous, Continuous, Alan Rios M.D.  •  hydrocortisone sodium succinate PF (Solu-CORTEF) 100 MG injection 100 mg, 100 mg, Intravenous, Q8HRS, Alan Rios M.D.    Current Outpatient Medications:   •  albuterol (PROVENTIL) 2.5mg/3ml Nebu Soln solution for nebulization, Take 2.5 mg by nebulization every four hours as needed for Shortness of Breath., Disp: , Rfl:   •  fluticasone-salmeterol (ADVAIR) 250-50 MCG/DOSE AEROSOL POWDER, BREATH ACTIVATED, Inhale 1 Puff every 12 hours., Disp: , Rfl:   •  aspirin EC (ECOTRIN) 81 MG Tablet Delayed Response, Take 81 mg by mouth every day., Disp: , Rfl:   •  ibuprofen (MOTRIN) 800 MG Tab, Take 800 mg by mouth every 8 hours as needed for Moderate Pain., Disp: , Rfl:   •  acetaminophen (TYLENOL) 500 MG Tab, Take 1,000 mg by mouth every 6 hours as needed for Moderate Pain., Disp: , Rfl:   •  lisinopril (PRINIVIL) 20 MG Tab, Take 20 mg by mouth 2 times a day., Disp: , Rfl:   •  metoprolol tartrate (LOPRESSOR) 100 MG Tab, Take 100 mg by mouth 2 times a day., Disp: , Rfl:   •   "felodipine (PLENDIL) 10 MG TABLET SR 24 HR, Take 10 mg by mouth every day., Disp: , Rfl:   •  albuterol 108 (90 Base) MCG/ACT Aero Soln inhalation aerosol, Inhale 2 Puffs by mouth every 6 hours as needed for Shortness of Breath., Disp: 8.5 g, Rfl: 1    ALLERGIES  No Known Allergies    PHYSICAL EXAM  VITAL SIGNS: BP (!) 191/98   Pulse (!) 126   Temp 36.4 °C (97.5 °F)   Resp (!) 154   Ht 1.651 m (5' 5\")   Wt (!) 136 kg (300 lb)   SpO2 94%   BMI 49.92 kg/m²     Constitutional: Patient is well developed, well nourished. Non-toxic appearing. Acutely disoriented.  HENT: Normocephalic, atraumatic.  Nose normal with no mucosal edema or drainage. Oropharynx dry.  Cardiovascular: Tachycardic without murmur  Thorax & Lungs: In severe respiratory distress, Diminished air exchange diffusely, No rhonchi or wheezing  Abdomen: Morbidly obese, non tender  Skin: Warm, Dry, No edema, no peripheral cyanosis.  Neurologic: Does respond to verbal stimulus but does not track well. Does not respond to commands. No lateralizing or focal deficits noted.  Psychiatric: Acutely disoriented    DIAGNOSTICS/PROCEDURES    LABS  Results for orders placed or performed during the hospital encounter of 10/31/21   CBC WITH DIFFERENTIAL   Result Value Ref Range    WBC 10.0 4.8 - 10.8 K/uL    RBC 7.52 (H) 4.20 - 5.40 M/uL    Hemoglobin 16.7 (H) 12.0 - 16.0 g/dL    Hematocrit 57.8 (H) 37.0 - 47.0 %    MCV 76.9 (L) 81.4 - 97.8 fL    MCH 22.2 (L) 27.0 - 33.0 pg    MCHC 28.9 (L) 33.6 - 35.0 g/dL    RDW 49.8 35.9 - 50.0 fL    Platelet Count 203 164 - 446 K/uL    MPV 10.0 9.0 - 12.9 fL    Neutrophils-Polys 76.20 (H) 44.00 - 72.00 %    Lymphocytes 12.00 (L) 22.00 - 41.00 %    Monocytes 11.00 0.00 - 13.40 %    Eosinophils 0.00 0.00 - 6.90 %    Basophils 0.30 0.00 - 1.80 %    Immature Granulocytes 0.50 0.00 - 0.90 %    Nucleated RBC 0.00 /100 WBC    Neutrophils (Absolute) 7.61 (H) 2.00 - 7.15 K/uL    Lymphs (Absolute) 1.20 1.00 - 4.80 K/uL    Monos " (Absolute) 1.10 (H) 0.00 - 0.85 K/uL    Eos (Absolute) 0.00 0.00 - 0.51 K/uL    Baso (Absolute) 0.03 0.00 - 0.12 K/uL    Immature Granulocytes (abs) 0.05 0.00 - 0.11 K/uL    NRBC (Absolute) 0.00 K/uL    Microcytosis 2+ (A)    COMP METABOLIC PANEL   Result Value Ref Range    Sodium 116 (LL) 135 - 145 mmol/L    Potassium 4.0 3.6 - 5.5 mmol/L    Chloride 76 (L) 96 - 112 mmol/L    Co2 25 20 - 33 mmol/L    Anion Gap 15.0 7.0 - 16.0    Glucose 183 (H) 65 - 99 mg/dL    Bun 12 8 - 22 mg/dL    Creatinine 0.84 0.50 - 1.40 mg/dL    Calcium 7.9 (L) 8.4 - 10.2 mg/dL    AST(SGOT) 33 12 - 45 U/L    ALT(SGPT) 16 2 - 50 U/L    Alkaline Phosphatase 107 (H) 30 - 99 U/L    Total Bilirubin 1.3 0.1 - 1.5 mg/dL    Albumin 2.7 (L) 3.2 - 4.9 g/dL    Total Protein 7.1 6.0 - 8.2 g/dL    Globulin 4.4 (H) 1.9 - 3.5 g/dL    A-G Ratio 0.6 g/dL   URINALYSIS    Specimen: Urine   Result Value Ref Range    Color Yellow     Character Clear     Specific Gravity 1.025 <1.035    Ph 6.0 5.0 - 8.0    Glucose Negative Negative mg/dL    Ketones Trace (A) Negative mg/dL    Protein >=300 (A) Negative mg/dL    Bilirubin Small (A) Negative    Nitrite Negative Negative    Leukocyte Esterase Negative Negative    Occult Blood Small (A) Negative    Micro Urine Req Microscopic    LACTIC ACID   Result Value Ref Range    Lactic Acid 2.5 (H) 0.5 - 2.0 mmol/L   ESTIMATED GFR   Result Value Ref Range    GFR If African American >60 >60 mL/min/1.73 m 2    GFR If Non African American >60 >60 mL/min/1.73 m 2   MAGNESIUM   Result Value Ref Range    Magnesium 1.4 (L) 1.5 - 2.5 mg/dL   PHOSPHORUS   Result Value Ref Range    Phosphorus 2.7 2.5 - 4.5 mg/dL   TROPONIN   Result Value Ref Range    Troponin T 10 6 - 19 ng/L   APTT   Result Value Ref Range    APTT 38.3 (H) 24.7 - 36.0 sec   PROTHROMBIN TIME   Result Value Ref Range    PT 14.2 12.0 - 14.6 sec    INR 1.19 (H) 0.87 - 1.13   D-Dimer   Result Value Ref Range    D-Dimer Screen 1.87 (H) 0.00 - 0.50 ug/mL (FEU)   CRP  Quantitative (Non-Cardiac)   Result Value Ref Range    Stat C-Reactive Protein 12.83 (H) 0.00 - 0.75 mg/dL   Procalcitonin   Result Value Ref Range    Procalcitonin 0.13 <0.25 ng/mL   PLATELET ESTIMATE   Result Value Ref Range    Plt Estimation Normal    MORPHOLOGY   Result Value Ref Range    RBC Morphology Present    DIFFERENTIAL COMMENT   Result Value Ref Range    Comments-Diff see below    URINE MICROSCOPIC (W/UA)   Result Value Ref Range    WBC 2-5 /hpf    RBC 2-5 (A) /hpf    Bacteria Rare (A) None /hpf    Epithelial Cells Moderate (A) Few /hpf    Trans Epithelial Cells Few /hpf    Amorphous Crystal Present /hpf    Hyaline Cast 3-5 (A) /lpf    Granular Casts 3-5 (A) /lpf   EKG   Result Value Ref Range    Report       [facility] Emergency Dept.    Test Date:  2021-10-31  Pt Name:    DAMIEN VALENCIA                  Department: [facility]  MRN:        [id number]                      [facility]   Gender:     Female                       Technician: CHARLA  :        [id number]                   Requested By:ER TRIAGE PROTOCOL  Order #:    [id number]                    Reading MD:    Measurements  Intervals                                Axis  Rate:       132                          P:          72  [place]:         159                          QRS:        118  QRSD:       90                           T:          3  QT:         297  QTc:        440    Interpretive Statements  Sinus tachycardia  Ventricular premature complex  Aberrant conduction of SV complex(es)  LAE, consider biatrial enlargement  Left posterior fascicular block  Borderline T abnormalities, inferior leads  ST elev, probable normal early repol pattern  Artifact in lead(s) II,III,aVF and baseline wander in lead(s) V6  Comp ared to ECG 2021 09:44:02  Ventricular premature complex(es) now present  Aberrant conduction of supraventricular beat(s) now present  T-wave abnormality now present  ST (T wave) deviation now present  Sinus  rhythm no longer present       Labs reviewed by me    EKG  Results for orders placed or performed during the hospital encounter of 10/31/21   EKG   Result Value Ref Range    Report       [facility] Emergency Dept.    Test Date:  2021-10-31  Pt Name:    DAMIEN VALENCIA                  Department: [facility]  MRN:        [id number]                      Room:       -ROOM 5  Gender:     Female                       Technician: CHARLA  :        [id number]                   Requested By:ER TRIAGE PROTOCOL  Order #:    [id number]                    Reading MD:    Measurements  Intervals                                Axis  Rate:       132                          P:          72  [place]:         159                          QRS:        118  QRSD:       90                           T:          3  QT:         297  QTc:        440    Interpretive Statements  Sinus tachycardia  Ventricular premature complex  Aberrant conduction of SV complex(es)  LAE, consider biatrial enlargement  Left posterior fascicular block  Borderline T abnormalities, inferior leads  ST elev, probable normal early repol pattern  Artifact in lead(s) II,III,aVF and baseline wander in lead(s) V6  Comp ared to ECG 2021 09:44:02  Ventricular premature complex(es) now present  Aberrant conduction of supraventricular beat(s) now present  T-wave abnormality now present  ST (T wave) deviation now present  Sinus rhythm no longer present       RADIOLOGY/PROCEDURES  DX-CHEST-PORTABLE (1 VIEW)   Final Result      1.  Supportive tubing as described above.   2.  Worsening hypoinflation.   3.  Patchy bilateral infiltrates again seen.   4.  No pneumothorax.      DX-CHEST-PORTABLE (1 VIEW)   Final Result      1.  There are bilateral parenchymal opacifications consistent with Covid 19 pneumonia.        Results and radiologist interpretation reviewed by me.       COURSE & MEDICAL DECISION MAKING  Pertinent Labs & Imaging studies reviewed. (See chart for  details)    5:15 PM - Patient seen and evaluated at bedside. Ordered for DX-Chest, D-Dimer, CRP, Quantitative, Procalcitonin, Cortisol, Magnesium, Phosphorus, Troponin, APTT, Prothrombin Time, COV-2, Flu A/B, RSV, Lactic Acid, CBC w/ Diff, CMP, UA, Urine Culture, Blood Culture, Estimated GFR and EKG to evaluate. Patient will be treated with Ativan 1 mg injection and IV Fluids for her symptoms. Differential diagnoses include, but are not limited to, respiratory failure, Covid pneumonia, CHF    5:45 PM - I discussed the patient's case and the above findings with Dr. Juares (Intensivist) who will come to the ED and advised me to intubate the patient and she will admit her into the ICU.    6:09 PM -we attempted to intubate the patient multiple times, she was an extremely difficult airway secondary to her body habitus, obesity, large tongue, small oral cavity.  Patient received LMA for short period of time as we are unable to get an endotracheal tube secondary to decreased visualization of records.  We attempted bougie but because patient's mouth would not open wide enough and her tongue obstructed the airway was very difficult.  Eventually she was intubated with a 6.0 [place]ian endotracheal tube.  We contacted anesthesia as well as general surgery for backup for surgical airway in the operating suite but they were unavailable to help us.  Sepsis protocol was initiated.  Chest x-ray showed what appeared to be viral pneumonia.,  Endotracheal tube is in place.  Dr. Juares plans to take the patient to the intensive care unit for central line placement.   her vital signs improved mildly prior to transfer to the floor but she is still in very critical condition.  NG tube, Orr catheter were inserted prior to her transfer.  HYDRATION: Based on the patient's presentation of Sepsis the patient was given IV fluids. IV Hydration was used because oral hydration was not adequate alone. Upon recheck following hydration, the patient was  improved.    CRITICAL CARE  The very real possibilty of a deterioration of this patient's condition required the highest level of my preparedness for sudden, emergent intervention. I provided critical care services, which included medication orders, frequent reevaluations of the patient's condition and response to treatment, ordering and reviewing test results, and discussing the case with various consultants. The critical care time associated with the care of the patient was 45 minutes. Review chart for interventions. This time is exclusive of any other billable procedures.       DISPOSITION:  Patient will be hospitalized by Dr. Juares in critical condition.    FINAL IMPRESSION  1. Acute respiratory failure with hypoxia (HCC)    2. Morbid obesity (HCC)    3. Real time reverse transcriptase PCR positive for COVID-19 virus    4.  Critical care time 45 minutes.  Mario COWAN (Scribe), am scribing for, and in the presence of, Samantha Chow D.O..    Electronically signed by: Mario Montelongo (Lucasibe), 10/31/2021    Samantha COWAN D.O. personally performed the services described in this documentation, as scribed by Mario Montelongo in my presence, and it is both accurate and complete.    The note accurately reflects work and decisions made by me.  Samantha Chow D.O.  11/1/2021  12:18 AM

## 2021-11-01 NOTE — PROCEDURES
Central Line Insertion    Date/Time: 10/31/2021 10:45 PM  Performed by: Alan Rios M.D.  Authorized by: Alan Rios M.D.     Consent:     Consent obtained:  Emergent situation    Alternatives discussed:  No treatment, delayed treatment and alternative treatment  Pre-procedure details:     Hand hygiene: Hand hygiene performed prior to insertion      Sterile barrier technique: All elements of maximal sterile technique followed      Skin preparation:  2% chlorhexidine    Skin preparation agent: Skin preparation agent completely dried prior to procedure    Sedation:     Sedation type:  Moderate (conscious) sedation  Anesthesia:     Anesthesia method:  None  Procedure details:     Location:  L internal jugular    Patient position:  Trendelenburg    Procedural supplies:  Triple lumen    Catheter size:  7.5 Fr    Landmarks identified: yes      Ultrasound guidance: yes      Sterile ultrasound techniques: Sterile gel and sterile probe covers were used      Number of attempts:  1    Successful placement: yes    Post-procedure details:     Post-procedure:  Dressing applied and line sutured    Assessment:  Blood return through all ports, no pneumothorax on x-ray, placement verified by x-ray and free fluid flow    Patient tolerance of procedure:  Tolerated well, no immediate complications

## 2021-11-01 NOTE — ED NOTES
ERP, Dr Juares RT, RN and ED at bedside for emergent intubation.  Pt was a very difficult intubation several tempts were made. After several attempts pt was successfully intubated.

## 2021-11-01 NOTE — ASSESSMENT & PLAN NOTE
Likely secondary from nocturnal and maybe daytime hypoxemia  No indication for therapeutic phlebotomy

## 2021-11-01 NOTE — H&P
Pulmonary History & Physical Note    Date of Service  10/31/2021    Primary Care Physician  Lorna Desir P.A.-C.    Consultants  none      Code Status  Full Code    Chief Complaint  Chief Complaint   Patient presents with   • Respiratory Distress   • ALOC       History of Presenting Illness  Adri Maldonado is a 46 y.o. female who presented 10/31/2021 with HTN, morbid obesity, secondary polycythemia brought in by [facility] with worsening SOB. Rapid COVID test + yesterday and on 3l oxygen was saturating 72%. Pt was altered.  Labs in ER showed Na of 116, altered and hypoxic, cyanotic and in extreme distress  Extremely difficult intubation but pt finally intubated with 6.0 ET tube  Transferred to the ICU for further management    I discussed the plan of care with DR. Chow and Dr. Lewis    Review of Systems  Review of Systems   Unable to perform ROS: Acuity of condition       Past Medical History   has a past medical history of ASTHMA.    Surgical History   has no past surgical history on file.     Family History  family history is not on file.   Family history reviewed with patient. There is no family history that is pertinent to the chief complaint.     Social History   reports that she has quit smoking. Her smoking use included cigarettes. She quit after 9.00 years of use. She has never used smokeless tobacco. She reports previous alcohol use. She reports that she does not use drugs.    Allergies  No Known Allergies    Medications  Prior to Admission Medications   Prescriptions Last Dose Informant Patient Reported? Taking?   acetaminophen (TYLENOL) 500 MG Tab 10/31/2021 at 1500 Family Member Yes Yes   Sig: Take 1,000 mg by mouth every 6 hours as needed for Moderate Pain.   albuterol (PROVENTIL) 2.5mg/3ml Nebu Soln solution for nebulization 10/31/2021 at 1300 Family Member Yes Yes   Sig: Take 2.5 mg by nebulization every four hours as needed for Shortness of Breath.   albuterol 108 (90 Base) MCG/ACT Aero Soln  inhalation aerosol 10/31/2021 at 1645 Rx Bottle (For Med Information) No No   Sig: Inhale 2 Puffs by mouth every 6 hours as needed for Shortness of Breath.   aspirin EC (ECOTRIN) 81 MG Tablet Delayed Response 10/31/2021 at 0700 Rx Bottle (For Med Information) Yes Yes   Sig: Take 81 mg by mouth every day.   felodipine (PLENDIL) 10 MG TABLET SR 24 HR 10/31/2021 at 0700 Rx Bottle (For Med Information) Yes No   Sig: Take 10 mg by mouth every day.   fluticasone-salmeterol (ADVAIR) 250-50 MCG/DOSE AEROSOL POWDER, BREATH ACTIVATED 10/31/2021 at 0700 Family Member Yes Yes   Sig: Inhale 1 Puff every 12 hours.   ibuprofen (MOTRIN) 800 MG Tab > 2 weeks at Unknown Rx Bottle (For Med Information) Yes Yes   Sig: Take 800 mg by mouth every 8 hours as needed for Moderate Pain.   lisinopril (PRINIVIL) 20 MG Tab 10/31/2021 at 0700 Rx Bottle (For Med Information) Yes No   Sig: Take 20 mg by mouth 2 times a day.   metoprolol tartrate (LOPRESSOR) 100 MG Tab 10/31/2021 at 0700 Rx Bottle (For Med Information) Yes No   Sig: Take 100 mg by mouth 2 times a day.      Facility-Administered Medications: None       Physical Exam  Temp:  [36.4 °C (97.5 °F)] 36.4 °C (97.5 °F)  Pulse:  [] 73  Resp:  [] 19  BP: ()/(31-98) 88/55  SpO2:  [88 %-95 %] 95 %  Blood Pressure: (!) 191/98   Temperature: 36.4 °C (97.5 °F)   Pulse: (!) 126   Respiration: (!) 154   Pulse Oximetry: 94 %       Physical Exam  Constitutional:       General: She is in acute distress.      Appearance: She is obese. She is ill-appearing and toxic-appearing.   HENT:      Head: Normocephalic and atraumatic.      Mouth/Throat:      Mouth: Mucous membranes are dry.      Comments: cyanotic  Abdominal:      General: There is distension.      Palpations: Abdomen is soft.      Comments: No bowel souns   Musculoskeletal:      Right lower leg: Edema present.      Left lower leg: Edema present.   Skin:     General: Skin is warm and dry.   Neurological:      Comments: Pt moving  extremities but not responding to any  Commands flayling    Psychiatric:      Comments: Unable to assess         Laboratory:  Recent Labs     10/31/21  1701   WBC 10.0   RBC 7.52*   HEMOGLOBIN 16.7*   HEMATOCRIT 57.8*   MCV 76.9*   MCH 22.2*   MCHC 28.9*   RDW 49.8   PLATELETCT 203   MPV 10.0     Recent Labs     10/31/21  1701 10/31/21  2100   SODIUM 116* 114*   POTASSIUM 4.0  --    CHLORIDE 76*  --    CO2 25  --    GLUCOSE 183*  --    BUN 12  --    CREATININE 0.84  --    CALCIUM 7.9*  --      Recent Labs     10/31/21  1701   ALTSGPT 16   ASTSGOT 33   ALKPHOSPHAT 107*   TBILIRUBIN 1.3   GLUCOSE 183*     Recent Labs     10/31/21  1701   APTT 38.3*   INR 1.19*     No results for input(s): NTPROBNP in the last 72 hours.  Recent Labs     10/31/21  2100   TRIGLYCERIDE 118     Recent Labs     10/31/21  1701   TROPONINT 10       Imaging:  DX-CHEST-PORTABLE (1 VIEW)   Final Result      1.  New left internal jugular central venous catheter with the tip in the distal superior vena cava. No pneumothorax.   2.  Unchanged bilateral parenchymal opacities.      DX-CHEST-PORTABLE (1 VIEW)   Final Result      1.  Supportive tubing as described above.   2.  Worsening hypoinflation.   3.  Patchy bilateral infiltrates again seen.   4.  No pneumothorax.      DX-CHEST-PORTABLE (1 VIEW)   Final Result      1.  There are bilateral parenchymal opacifications consistent with Covid 19 pneumonia.      DX-CHEST-PORTABLE (1 VIEW)    (Results Pending)       CXR personally reviewed after ET tube placement and line placedment, both in good position. B/l pulmonary infiltrates consistent with covid pna    Assessment/Plan:  I anticipate this patient will require at least two midnights for appropriate medical management, necessitating inpatient admission.    Arterial hypotension- (present on admission)  Assessment & Plan  Due to medications - propofol and fentanyl  Started levophed    Hyponatremia- (present on admission)  Assessment &  Plan  Critical  Initially 117 the 116 and now 114  Start hypertonic saline rate 100 cc/hr  Central line in place  Check in 4 hrs    Etiology likely SIADH due to the severity of COVID pna - urine Na, osm urine and serum    Encephalopathy acute- (present on admission)  Assessment & Plan  Due to covid pna and hyponatremia which is severe  Slow correction of 8 meq/24 hrs of Na  Checking q 4    Acute respiratory failure due to COVID-19 (HCC)- (present on admission)  Assessment & Plan  Intubated emergently 10/31/21  Tube to fragility of the ET tube no proning first 12 hrs  Pao2/Fio2 gradient is < 150 mmhg  Start hydrocortisone 100 mg q 8  Call ID in am for baricitinib vs tocilimuzab - CRP 10.53  D dimer 2.04  Strict droplet, contact and  Eye protection    Polycythemia vera (HCC)- (present on admission)  Assessment & Plan  Likely secondary from nocturnal and maybe daytime hypoxemia      VTE prophylaxis: heparin ppx    Feeding:  OG in place so can start tube feeds in am  Analgesia: Fentanyl drip  Sedation:  precedex drip  Thromboembolic prophylaxis:  heparin  Head of bed elevation:  yes  Ulcer prophylaxis:  [id number]  Glucose:  Sliding scale  Spontaneous breathing trial:  no  Bowel regimen:  yes  Indwelling catheter:  yes  Descalation of Abx:n/a  Code Status:  full  Disposition:  icu  Restraints:  Wrist b/l as interfering with medical devices      Critical care time managing patient's acute hypoxic respiratory failure is 88 mins which does not include the procedure times

## 2021-11-01 NOTE — PROGRESS NOTES
Dr. Boyce informed of critical sodium value of 119 at 0800. No new orders at that time. During morning rounds 3% restarted at rate ordered.

## 2021-11-01 NOTE — ASSESSMENT & PLAN NOTE
Comorbidity complicating ventilator synchrony, mechanics  Also associated with severe disease in COVID

## 2021-11-01 NOTE — PROGRESS NOTES
Critical Care Progress Note    Date of admission  10/31/2021    Chief Complaint  46 y.o. female admitted 10/31/2021 with hyponatremia and acute hypoxemic respiratory failure in the setting of COVID-19    Hospital Course  46 y.o. female admitted 10/31/2021 with HTN, morbid obesity, secondary polycythemia brought in by [facility] with worsening SOB. Rapid COVID test + yesterday and on 3L oxygen was saturating 72%. Pt was altered.    Labs in ER showed Na of 116, altered and hypoxic, cyanotic and in extreme distress  Extremely difficult intubation but pt finally intubated with 6.0 ET tube  Transferred to the ICU for further management - per Dr Juares admit H&P 10/31    Interval Problem Update  Chart review from the past 24 hours includes imaging, laboratory studies, vital signs and notes available.  Pertinent data for today's visit includes    First day rounding on patient  Chart reviewed and case discussed with Dr Juares   has covid, pt mother at bedside    Cardiac: levophed @ 6mcg/min, BP at goal  Pulm: SCMV 22/440/18/80%, 7.27/67/127. CXR ETT in upper trachea, left IJ CVC terminating in lower SVC, patchy bilateral infiltrates in RUL and LLL  Neuro: propofol @ 30, fentanyl @ 100, moves to pain  Heme: chronic polycythemia stable, mild leukocytosis  I/O: MULU, cr 1.17 baseline 0.6 in 2019  ID:  COVID + 10/30. Hydrocortisone 100mg Q8H.  GI/endo: NGT in place, sugars at goal  Labs/Imaging: Na 116- > 119 on 3% saline gtt @ 100cc/hr  Lines: triple lumen IJ one port only working  Mobility: bedrest  Symptoms: Unable to assess due to intubation    Review of Systems  Review of Systems   Unable to perform ROS: Intubated      Vital Signs for last 24 hours   Temp:  [36.4 °C (97.5 °F)-37.4 °C (99.3 °F)] 37.2 °C (99 °F)  Pulse:  [] 61  Resp:  [0-154] 19  BP: ()/(31-98) 117/63  SpO2:  [88 %-97 %] 93 %    Respiratory Information for the last 24 hours  Vent Mode: (S)CMV  Rate (breaths/min): 22  Vt Target (mL):  440  PEEP/CPAP: 18  MAP: 23  Control VTE (exp VT): 450    Physical Exam   Physical Exam  Vitals and nursing note reviewed.   Constitutional:       General: She is not in acute distress.     Appearance: She is well-developed. She is obese. She is ill-appearing. She is not diaphoretic.   HENT:      Mouth/Throat:      Comments: ETT in place, 26cm at teeth  Eyes:      General: No scleral icterus.        Right eye: No discharge.         Left eye: No discharge.   Neck:      Thyroid: No thyromegaly.      Vascular: No JVD.   Cardiovascular:      Rate and Rhythm: Regular rhythm. Tachycardia present.      Heart sounds: Normal heart sounds. No murmur heard.   No gallop.    Pulmonary:      Effort: No respiratory distress.      Breath sounds: Normal breath sounds. No wheezing or rales.      Comments: Mechanical breath sounds  Abdominal:      General: There is distension.      Palpations: Abdomen is soft.      Tenderness: There is no abdominal tenderness. There is no guarding.   Musculoskeletal:         General: No tenderness.      Cervical back: No rigidity.   Lymphadenopathy:      Cervical: No cervical adenopathy.   Skin:     General: Skin is warm.      Capillary Refill: Capillary refill takes less than 2 seconds.      Findings: No erythema or rash.   Neurological:      General: No focal deficit present.      Mental Status: She is alert.      Cranial Nerves: No cranial nerve deficit.      Sensory: No sensory deficit.      Motor: No abnormal muscle tone.      Comments: Agitated, pulling at tubes/lines   Psychiatric:         Behavior: Behavior normal.       Medications  Current Facility-Administered Medications   Medication Dose Route Frequency Provider Last Rate Last Admin   • fentaNYL (SUBLIMAZE) 50 mcg/mL in 50mL   Intravenous Continuous Deniz Boyce M.D. 4 mL/hr at 11/01/21 1024 200 mcg at 11/01/21 1024   • dexmedetomidine (Precedex) 400 mcg in  mL infusion  0.1-1.5 mcg/kg/hr (Order-Specific) Intravenous Continuous  Deniz Boyce M.D. 4.5 mL/hr at 11/01/21 1132 0.2 mcg/kg/hr at 11/01/21 1132   • tocilizumab (Actemra) 400 mg in  mL IVPB  400 mg Intravenous Once Deniz Boyce M.D. 100 mL/hr at 11/01/21 1134 400 mg at 11/01/21 1134   • lactated ringers infusion (BOLUS): BMI less than or equal to 30  30 mL/kg Intravenous Once Samantha Chow D.O.       • ondansetron (ZOFRAN) syringe/vial injection 4 mg  4 mg Intravenous Q6HRS PRES Rios M.D.       • ondansetron (ZOFRAN ODT) dispertab 4 mg  4 mg Oral Q6HRS PRES Rios M.D.       • hydrALAZINE (APRESOLINE) injection 10 mg  10 mg Intravenous Q6HRS PRES Rios M.D.       • heparin injection 5,000 Units  5,000 Units Subcutaneous Q8HRS Alan Rios M.D.   5,000 Units at 11/01/21 0538   • acetaminophen (Tylenol) tablet 650 mg  650 mg Enteral Tube Q6HRS PRES Rios M.D.       • insulin regular (HumuLIN R,NovoLIN R) injection  3-14 Units Subcutaneous Q6HRS Alan Rios M.D.   3 Units at 11/01/21 1126    And   • glucose 4 g chewable tablet 16 g  16 g Oral Q15 MIN PRES Rios M.D.        And   • dextrose 50% (D50W) injection 50 mL  50 mL Intravenous Q15 MIN PRES Rios M.D.       • Respiratory Therapy Consult   Nebulization Continuous RT Alan Rios M.D.       • famotidine (PEPCID) tablet 20 mg  20 mg Enteral Tube Q12HRS Alan Rios M.D.        Or   • famotidine (PEPCID) injection 20 mg  20 mg Intravenous Q12HRS Alan Rios M.D.   20 mg at 11/01/21 0506   • senna-docusate (PERICOLACE or SENOKOT S) 8.6-50 MG per tablet 2 Tablet  2 Tablet Enteral Tube BID Alan Rios M.D.   2 Tablet at 11/01/21 0506    And   • polyethylene glycol/lytes (MIRALAX) PACKET 1 Packet  1 Packet Enteral Tube QDAY PRES Rios M.D.        And   • magnesium hydroxide (MILK OF MAGNESIA) suspension 30 mL  30 mL Enteral Tube QDAY PRES Rios,  M.D.        And   • bisacodyl (DULCOLAX) suppository 10 mg  10 mg Rectal QDAY PRN Alan Rios M.D.       • MD Alert...ICU Electrolyte Replacement per Pharmacy   Other PHARMACY TO DOSE Alan Rios M.D.       • lidocaine (XYLOCAINE) 1 % injection 2 mL  2 mL Tracheal Tube Q30 MIN PRN Alan Rios M.D.       • propofol (DIPRIVAN) injection  0-80 mcg/kg/min Intravenous Continuous Alan Rios M.D. 24.5 mL/hr at 11/01/21 0850 30 mcg/kg/min at 11/01/21 0850   • hydrocortisone sodium succinate PF (Solu-CORTEF) 100 MG injection 100 mg  100 mg Intravenous Q8HRS Alan Rios M.D.   100 mg at 11/01/21 0507   • norepinephrine (Levophed) 8 mg in 250 mL NS infusion (premix)  0-30 mcg/min Intravenous Continuous Alan Rios M.D. 11.3 mL/hr at 11/01/21 0135 6 mcg/min at 11/01/21 0135   • 3% sodium chloride (HYPERTONIC SALINE) 500mL infusion   Intravenous Continuous Deniz Boyce M.D. 30 mL/hr at 11/01/21 1052 Rate Change at 11/01/21 1052       Fluids    Intake/Output Summary (Last 24 hours) at 11/1/2021 1214  Last data filed at 11/1/2021 1200  Gross per 24 hour   Intake 1686.71 ml   Output 890 ml   Net 796.71 ml       Laboratory  Recent Labs     10/31/21  2038 11/01/21  0503   ISTATAPH 7.284* 7.270*   ISTATAPCO2 68.5* 67.4*   ISTATAPO2 81 127*   ISTATATCO2 35* 33   [id number] 94 98   ISTATARTHCO3 32.5* 30.9*   ISTATARTBE 2 1   ISTATTEMP 37.8 C 37.0 C   ISTATSPEC Arterial Arterial   ISTATAPHTC 7.272* 7.270*   [id number] 85 127*         Recent Labs     10/31/21  1701 10/31/21  2100 11/01/21  0310 11/01/21  0755 11/01/21  1112   SODIUM 116*   < > 119* 119* 120*   POTASSIUM 4.0  --  5.0  --  5.0   CHLORIDE 76*  --  84*  --  85*   CO2 25  --  23  --  27   BUN 12  --  16  --  18   CREATININE 0.84  --  1.17  --  1.05   MAGNESIUM 1.4*  --  1.6  --   --    PHOSPHORUS 2.7  --  4.4  --   --    CALCIUM 7.9*  --  6.9*  --  7.5*    < > = values in this interval not displayed.      Recent Labs     10/31/21  1701 11/01/21  0310 11/01/21  1112   ALTSGPT 16 13  --    ASTSGOT 33 28  --    ALKPHOSPHAT 107* 78  --    TBILIRUBIN 1.3 0.7  --    GLUCOSE 183* 192* 177*     Recent Labs     10/31/21  1701 11/01/21  0310   WBC 10.0 10.9*   NEUTSPOLYS 76.20* 86.40*   LYMPHOCYTES 12.00* 5.30*   MONOCYTES 11.00 7.60   EOSINOPHILS 0.00 0.00   BASOPHILS 0.30 0.10   ASTSGOT 33 28   ALTSGPT 16 13   ALKPHOSPHAT 107* 78   TBILIRUBIN 1.3 0.7     Recent Labs     10/31/21  1701 11/01/21  0310   RBC 7.52* 6.85*   HEMOGLOBIN 16.7* 15.1   HEMATOCRIT 57.8* 53.5*   PLATELETCT 203 268   PROTHROMBTM 14.2  --    APTT 38.3*  --    INR 1.19*  --      Imaging  X-Ray:  I have personally reviewed the images and compared with prior images.  CXR ETT in upper trachea, left IJ CVC terminating in lower SVC, patchy bilateral infiltrates in RUL and LLL    Assessment/Plan    * Acute hypoxemic respiratory failure due to COVID-19 (HCC)- (present on admission)  Assessment & Plan  Intubated 10/31/21, 6.0 ETT, challenging intubation  SCMV 22/440/18/80%, 7.27/67/127   P/F > 150, no proning  Cont hydrocortisone 100 mg q 8  Meets criteria for tocilimuzab, give dose x 1 11/1/2021   Strict droplet, contact and  Eye protection  Add precedex to sedation regimen  Start TF    Septic shock (HCC)  Assessment & Plan  This is Septic shock Present on admission  SIRS criteria identified on my evaluation include: Fever, with temperature greater than 101 deg F and Tachypnea, with respirations greater than 20 per minute  Source is COVID pneumonia  Presentation includes: Severe sepsis present and persistent hypotension after 30 ml/kg completed.   Despite appropriate fluid resuscitation with crystalloid given per sepsis guidelines, the patient remains hypotensive with systolic blood pressure less than 90 or MAP less than 65  Hemodynamic support with additional fluids and IV vasopressors as needed to maintain a SBP of 90 or MAP of 65  IV antibiotics as  appropriate for source of sepsis  Reassessment: I have reassessed the patient's hemodynamic status    Septic shock 2/2 covid  TTE 10/18: mild pHTN, EF 65%  Levophed titrate to MAP > 65  Add precedex, wean propofol    Hyponatremia- (present on admission)  Assessment & Plan  Critical  Unclear if acute/chronic, no labs since 2019  Resume 3% saline @ 30cc/hr, avoid overcorrection > 8mEQ/24'  Bmp Q4h  Central line in place  Check in 4 hrs  Etiology likely SIADH due to the severity of COVID pna - f/u urine Na, osm urine and serum    Encephalopathy acute- (present on admission)  Assessment & Plan  Due to covid pna and hyponatremia which is severe  Slow correction of 8 meq/24 hrs of Na  Checking q 4    Morbid obesity with BMI of 50.0-59.9, adult (HCC)  Assessment & Plan  Comorbidity complicating ventilator synchrony, mechanics  Also associated with severe disease in COVID    Polycythemia vera (HCC)- (present on admission)  Assessment & Plan  Likely secondary from nocturnal and maybe daytime hypoxemia  No indication for therapeutic phlebotomy      VTE:  Heparin  Ulcer: H2 Antagonist  Lines: Central Line  Ongoing indication addressed and Orr Catheter  Ongoing indication addressed    I have performed a physical exam and reviewed and updated ROS and Plan today (11/1/2021). In review of yesterday's note (10/31/2021), there are no changes except as documented above.     Discussed patient condition and risk of morbidity and/or mortality with Family, RN, RT, Pharmacy and Charge nurse / hot rounds     The patient remains critically ill.  Critical care time = 50 minutes in directly providing and coordinating critical care and extensive data review.  No time overlap and excludes procedures.    This note was generated using voice recognition software which has a chance of producing errors of grammar and content.  I have made every reasonable attempt to find and correct any errors, but it should be expected that some may not be found  prior to finalization of this note.  __________  Deniz Boyce MD  Pulmonary and Critical Care Medicine  [facility]

## 2021-11-02 ENCOUNTER — APPOINTMENT (OUTPATIENT)
Dept: RADIOLOGY | Facility: MEDICAL CENTER | Age: 46
DRG: 871 | End: 2021-11-02
Attending: INTERNAL MEDICINE
Payer: COMMERCIAL

## 2021-11-02 PROBLEM — N17.9 AKI (ACUTE KIDNEY INJURY) (HCC): Status: ACTIVE | Noted: 2021-11-02

## 2021-11-02 LAB
ANION GAP SERPL CALC-SCNC: 10 MMOL/L (ref 7–16)
ANION GAP SERPL CALC-SCNC: 12 MMOL/L (ref 7–16)
ANION GAP SERPL CALC-SCNC: 4 MMOL/L (ref 7–16)
ANION GAP SERPL CALC-SCNC: 7 MMOL/L (ref 7–16)
ANION GAP SERPL CALC-SCNC: 9 MMOL/L (ref 7–16)
ANION GAP SERPL CALC-SCNC: 9 MMOL/L (ref 7–16)
BASE EXCESS BLDA CALC-SCNC: 5 MMOL/L (ref -4–3)
BASE EXCESS BLDA CALC-SCNC: 5 MMOL/L (ref -4–3)
BASOPHILS # BLD AUTO: 0 % (ref 0–1.8)
BASOPHILS # BLD: 0 K/UL (ref 0–0.12)
BODY TEMPERATURE: 36.6 CENTIGRADE
BODY TEMPERATURE: ABNORMAL DEGREES
BREATHS SETTING VENT: 20
BUN SERPL-MCNC: 19 MG/DL (ref 8–22)
BUN SERPL-MCNC: 20 MG/DL (ref 8–22)
BUN SERPL-MCNC: 20 MG/DL (ref 8–22)
BUN SERPL-MCNC: 21 MG/DL (ref 8–22)
CA-I SERPL-SCNC: 1.09 MMOL/L (ref 1.1–1.3)
CALCIUM SERPL-MCNC: 7.8 MG/DL (ref 8.4–10.2)
CALCIUM SERPL-MCNC: 8 MG/DL (ref 8.4–10.2)
CALCIUM SERPL-MCNC: 8 MG/DL (ref 8.4–10.2)
CALCIUM SERPL-MCNC: 8.1 MG/DL (ref 8.4–10.2)
CALCIUM SERPL-MCNC: 8.1 MG/DL (ref 8.4–10.2)
CALCIUM SERPL-MCNC: 8.2 MG/DL (ref 8.4–10.2)
CHLORIDE SERPL-SCNC: 93 MMOL/L (ref 96–112)
CHLORIDE SERPL-SCNC: 95 MMOL/L (ref 96–112)
CHLORIDE SERPL-SCNC: 96 MMOL/L (ref 96–112)
CHLORIDE SERPL-SCNC: 96 MMOL/L (ref 96–112)
CO2 BLDA-SCNC: 34 MMOL/L (ref 20–33)
CO2 SERPL-SCNC: 26 MMOL/L (ref 20–33)
CO2 SERPL-SCNC: 27 MMOL/L (ref 20–33)
CO2 SERPL-SCNC: 27 MMOL/L (ref 20–33)
CO2 SERPL-SCNC: 28 MMOL/L (ref 20–33)
CO2 SERPL-SCNC: 30 MMOL/L (ref 20–33)
CO2 SERPL-SCNC: 32 MMOL/L (ref 20–33)
CREAT SERPL-MCNC: 0.91 MG/DL (ref 0.5–1.4)
CREAT SERPL-MCNC: 1 MG/DL (ref 0.5–1.4)
CREAT SERPL-MCNC: 1.06 MG/DL (ref 0.5–1.4)
CREAT SERPL-MCNC: 1.07 MG/DL (ref 0.5–1.4)
CREAT SERPL-MCNC: 1.11 MG/DL (ref 0.5–1.4)
CREAT SERPL-MCNC: 1.18 MG/DL (ref 0.5–1.4)
CRP SERPL HS-MCNC: 12.91 MG/DL (ref 0–0.75)
DELSYS IDSYS: ABNORMAL
EOSINOPHIL # BLD AUTO: 0 K/UL (ref 0–0.51)
EOSINOPHIL NFR BLD: 0 % (ref 0–6.9)
ERYTHROCYTE [DISTWIDTH] IN BLOOD BY AUTOMATED COUNT: 53 FL (ref 35.9–50)
GLUCOSE BLD-MCNC: 171 MG/DL (ref 65–99)
GLUCOSE BLD-MCNC: 209 MG/DL (ref 65–99)
GLUCOSE BLD-MCNC: 209 MG/DL (ref 65–99)
GLUCOSE SERPL-MCNC: 151 MG/DL (ref 65–99)
GLUCOSE SERPL-MCNC: 176 MG/DL (ref 65–99)
GLUCOSE SERPL-MCNC: 184 MG/DL (ref 65–99)
GLUCOSE SERPL-MCNC: 203 MG/DL (ref 65–99)
GLUCOSE SERPL-MCNC: 211 MG/DL (ref 65–99)
GLUCOSE SERPL-MCNC: 213 MG/DL (ref 65–99)
HCO3 BLDA-SCNC: 31 MMOL/L (ref 17–25)
HCO3 BLDA-SCNC: 32 MMOL/L (ref 17–25)
HCT VFR BLD AUTO: 54.1 % (ref 37–47)
HGB BLD-MCNC: 15.4 G/DL (ref 12–16)
HOROWITZ INDEX BLDA+IHG-RTO: 148 MM[HG]
IMM GRANULOCYTES # BLD AUTO: 0.02 K/UL (ref 0–0.11)
IMM GRANULOCYTES NFR BLD AUTO: 0.4 % (ref 0–0.9)
INHALED O2 FLOW RATE: 60 L/MIN (ref 2–10)
LYMPHOCYTES # BLD AUTO: 0.78 K/UL (ref 1–4.8)
LYMPHOCYTES NFR BLD: 17 % (ref 22–41)
MAGNESIUM SERPL-MCNC: 2.3 MG/DL (ref 1.5–2.5)
MCH RBC QN AUTO: 22.2 PG (ref 27–33)
MCHC RBC AUTO-ENTMCNC: 28.5 G/DL (ref 33.6–35)
MCV RBC AUTO: 78.1 FL (ref 81.4–97.8)
MODE IMODE: ABNORMAL
MONOCYTES # BLD AUTO: 0.69 K/UL (ref 0–0.85)
MONOCYTES NFR BLD AUTO: 15.1 % (ref 0–13.4)
NEUTROPHILS # BLD AUTO: 3.09 K/UL (ref 2–7.15)
NEUTROPHILS NFR BLD: 67.5 % (ref 44–72)
NRBC # BLD AUTO: 0 K/UL
NRBC BLD-RTO: 0 /100 WBC
O2/TOTAL GAS SETTING VFR VENT: 100 % (ref 30–60)
O2/TOTAL GAS SETTING VFR VENT: 50 %
PCO2 BLDA: 49.6 MMHG (ref 26–37)
PCO2 BLDA: 51 MMHG (ref 26–37)
PCO2 TEMP ADJ BLDA: 48.7 MMHG (ref 26–37)
PEEP END EXPIRATORY PRESSURE IPEEP: 18 CMH20
PH BLDA: 7.41 [PH] (ref 7.4–7.5)
PH BLDA: 7.41 [PH] (ref 7.4–7.5)
PH TEMP ADJ BLDA: 7.41 [PH] (ref 7.4–7.5)
PH TEMP ADJ BLDA: 7.42 [PH] (ref 7.4–7.5)
PHOSPHATE SERPL-MCNC: 3.5 MG/DL (ref 2.5–4.5)
PLATELET # BLD AUTO: 246 K/UL (ref 164–446)
PMV BLD AUTO: 10.7 FL (ref 9–12.9)
PO2 BLDA: 74 MMHG (ref 64–87)
PO2 BLDA: 86.2 MMHG (ref 64–87)
PO2 TEMP ADJ BLDA: 71 MMHG (ref 64–87)
PO2 TEMP ADJ BLDA: 84 MMHG (ref 64–87)
POTASSIUM SERPL-SCNC: 4.4 MMOL/L (ref 3.6–5.5)
POTASSIUM SERPL-SCNC: 5 MMOL/L (ref 3.6–5.5)
POTASSIUM SERPL-SCNC: 5 MMOL/L (ref 3.6–5.5)
POTASSIUM SERPL-SCNC: 5.1 MMOL/L (ref 3.6–5.5)
POTASSIUM SERPL-SCNC: 5.1 MMOL/L (ref 3.6–5.5)
POTASSIUM SERPL-SCNC: 5.3 MMOL/L (ref 3.6–5.5)
PREALB SERPL-MCNC: 6.3 MG/DL (ref 18–38)
PROCALCITONIN SERPL-MCNC: 0.41 NG/ML
RBC # BLD AUTO: 6.93 M/UL (ref 4.2–5.4)
SAO2 % BLDA: 94 % (ref 93–99)
SAO2 % BLDA: 96 % (ref 93–99)
SODIUM SERPL-SCNC: 130 MMOL/L (ref 135–145)
SODIUM SERPL-SCNC: 131 MMOL/L (ref 135–145)
SODIUM SERPL-SCNC: 131 MMOL/L (ref 135–145)
SODIUM SERPL-SCNC: 132 MMOL/L (ref 135–145)
SODIUM SERPL-SCNC: 132 MMOL/L (ref 135–145)
SODIUM SERPL-SCNC: 135 MMOL/L (ref 135–145)
SPECIMEN DRAWN FROM PATIENT: ABNORMAL
TIDAL VOLUME IVT: 440 ML
TRIGL SERPL-MCNC: 161 MG/DL (ref 0–149)
WBC # BLD AUTO: 4.6 K/UL (ref 4.8–10.8)

## 2021-11-02 PROCEDURE — 700111 HCHG RX REV CODE 636 W/ 250 OVERRIDE (IP): Performed by: INTERNAL MEDICINE

## 2021-11-02 PROCEDURE — 36592 COLLECT BLOOD FROM PICC: CPT

## 2021-11-02 PROCEDURE — 84134 ASSAY OF PREALBUMIN: CPT

## 2021-11-02 PROCEDURE — 85025 COMPLETE CBC W/AUTO DIFF WBC: CPT

## 2021-11-02 PROCEDURE — 94760 N-INVAS EAR/PLS OXIMETRY 1: CPT

## 2021-11-02 PROCEDURE — 82803 BLOOD GASES ANY COMBINATION: CPT

## 2021-11-02 PROCEDURE — 84478 ASSAY OF TRIGLYCERIDES: CPT

## 2021-11-02 PROCEDURE — 84100 ASSAY OF PHOSPHORUS: CPT

## 2021-11-02 PROCEDURE — 82330 ASSAY OF CALCIUM: CPT

## 2021-11-02 PROCEDURE — 36600 WITHDRAWAL OF ARTERIAL BLOOD: CPT

## 2021-11-02 PROCEDURE — 83735 ASSAY OF MAGNESIUM: CPT

## 2021-11-02 PROCEDURE — 700101 HCHG RX REV CODE 250: Performed by: INTERNAL MEDICINE

## 2021-11-02 PROCEDURE — 700105 HCHG RX REV CODE 258: Performed by: INTERNAL MEDICINE

## 2021-11-02 PROCEDURE — 99291 CRITICAL CARE FIRST HOUR: CPT | Performed by: INTERNAL MEDICINE

## 2021-11-02 PROCEDURE — 84145 PROCALCITONIN (PCT): CPT

## 2021-11-02 PROCEDURE — 87086 URINE CULTURE/COLONY COUNT: CPT

## 2021-11-02 PROCEDURE — 80048 BASIC METABOLIC PNL TOTAL CA: CPT | Mod: 91

## 2021-11-02 PROCEDURE — 82962 GLUCOSE BLOOD TEST: CPT | Mod: 91

## 2021-11-02 PROCEDURE — A9270 NON-COVERED ITEM OR SERVICE: HCPCS | Performed by: INTERNAL MEDICINE

## 2021-11-02 PROCEDURE — 770022 HCHG ROOM/CARE - ICU (200)

## 2021-11-02 PROCEDURE — 94640 AIRWAY INHALATION TREATMENT: CPT

## 2021-11-02 PROCEDURE — 700102 HCHG RX REV CODE 250 W/ 637 OVERRIDE(OP): Performed by: INTERNAL MEDICINE

## 2021-11-02 PROCEDURE — 71045 X-RAY EXAM CHEST 1 VIEW: CPT

## 2021-11-02 PROCEDURE — 87040 BLOOD CULTURE FOR BACTERIA: CPT

## 2021-11-02 PROCEDURE — 94003 VENT MGMT INPAT SUBQ DAY: CPT

## 2021-11-02 PROCEDURE — 86140 C-REACTIVE PROTEIN: CPT

## 2021-11-02 RX ADMIN — INSULIN HUMAN 3 UNITS: 100 INJECTION, SOLUTION PARENTERAL at 23:46

## 2021-11-02 RX ADMIN — DEXMEDETOMIDINE HYDROCHLORIDE 0.6 MCG/KG/HR: 100 INJECTION, SOLUTION, CONCENTRATE INTRAVENOUS at 16:37

## 2021-11-02 RX ADMIN — ACETAMINOPHEN 650 MG: 325 TABLET ORAL at 08:13

## 2021-11-02 RX ADMIN — DEXMEDETOMIDINE HYDROCHLORIDE 0.6 MCG/KG/HR: 100 INJECTION, SOLUTION, CONCENTRATE INTRAVENOUS at 07:36

## 2021-11-02 RX ADMIN — INSULIN HUMAN 3 UNITS: 100 INJECTION, SOLUTION PARENTERAL at 05:31

## 2021-11-02 RX ADMIN — PROPOFOL 10 MCG/KG/MIN: 10 INJECTION, EMULSION INTRAVENOUS at 03:22

## 2021-11-02 RX ADMIN — DEXMEDETOMIDINE HYDROCHLORIDE 0.6 MCG/KG/HR: 100 INJECTION, SOLUTION, CONCENTRATE INTRAVENOUS at 03:19

## 2021-11-02 RX ADMIN — HYDROCORTISONE SODIUM SUCCINATE 100 MG: 100 INJECTION, POWDER, FOR SOLUTION INTRAMUSCULAR; INTRAVENOUS at 22:07

## 2021-11-02 RX ADMIN — INSULIN HUMAN 3 UNITS: 100 INJECTION, SOLUTION PARENTERAL at 13:14

## 2021-11-02 RX ADMIN — DEXTROSE MONOHYDRATE: 50 INJECTION, SOLUTION INTRAVENOUS at 06:19

## 2021-11-02 RX ADMIN — Medication 300 MCG/HR: at 14:35

## 2021-11-02 RX ADMIN — SENNOSIDES AND DOCUSATE SODIUM 2 TABLET: 50; 8.6 TABLET ORAL at 05:23

## 2021-11-02 RX ADMIN — HEPARIN SODIUM 5000 UNITS: 5000 INJECTION, SOLUTION INTRAVENOUS; SUBCUTANEOUS at 13:11

## 2021-11-02 RX ADMIN — DEXMEDETOMIDINE HYDROCHLORIDE 1.5 MCG/KG/HR: 100 INJECTION, SOLUTION, CONCENTRATE INTRAVENOUS at 23:58

## 2021-11-02 RX ADMIN — FAMOTIDINE 20 MG: 10 INJECTION INTRAVENOUS at 18:00

## 2021-11-02 RX ADMIN — PROPOFOL 10 MCG/KG/MIN: 10 INJECTION, EMULSION INTRAVENOUS at 13:15

## 2021-11-02 RX ADMIN — ACETAMINOPHEN 650 MG: 325 TABLET ORAL at 22:13

## 2021-11-02 RX ADMIN — FAMOTIDINE 20 MG: 20 TABLET ORAL at 05:24

## 2021-11-02 RX ADMIN — Medication 2500 MCG: at 09:23

## 2021-11-02 RX ADMIN — HYDROCORTISONE SODIUM SUCCINATE 100 MG: 100 INJECTION, POWDER, FOR SOLUTION INTRAMUSCULAR; INTRAVENOUS at 13:15

## 2021-11-02 RX ADMIN — DEXTROSE MONOHYDRATE 1000 ML: 50 INJECTION, SOLUTION INTRAVENOUS at 16:37

## 2021-11-02 RX ADMIN — HEPARIN SODIUM 5000 UNITS: 5000 INJECTION, SOLUTION INTRAVENOUS; SUBCUTANEOUS at 22:07

## 2021-11-02 RX ADMIN — Medication 300 MCG/HR: at 05:51

## 2021-11-02 RX ADMIN — HYDRALAZINE HYDROCHLORIDE 10 MG: 20 INJECTION INTRAMUSCULAR; INTRAVENOUS at 22:07

## 2021-11-02 RX ADMIN — DEXMEDETOMIDINE HYDROCHLORIDE 1.5 MCG/KG/HR: 100 INJECTION, SOLUTION, CONCENTRATE INTRAVENOUS at 21:01

## 2021-11-02 RX ADMIN — HEPARIN SODIUM 5000 UNITS: 5000 INJECTION, SOLUTION INTRAVENOUS; SUBCUTANEOUS at 05:24

## 2021-11-02 RX ADMIN — ACETAMINOPHEN 650 MG: 325 TABLET ORAL at 01:06

## 2021-11-02 RX ADMIN — ACETAMINOPHEN 650 MG: 325 TABLET ORAL at 15:52

## 2021-11-02 RX ADMIN — DEXMEDETOMIDINE HYDROCHLORIDE 0.6 MCG/KG/HR: 100 INJECTION, SOLUTION, CONCENTRATE INTRAVENOUS at 13:38

## 2021-11-02 RX ADMIN — HYDROCORTISONE SODIUM SUCCINATE 100 MG: 100 INJECTION, POWDER, FOR SOLUTION INTRAMUSCULAR; INTRAVENOUS at 05:23

## 2021-11-02 ASSESSMENT — PATIENT HEALTH QUESTIONNAIRE - PHQ9
2. FEELING DOWN, DEPRESSED, IRRITABLE, OR HOPELESS: NOT AT ALL
1. LITTLE INTEREST OR PLEASURE IN DOING THINGS: NOT AT ALL
SUM OF ALL RESPONSES TO PHQ9 QUESTIONS 1 AND 2: 0

## 2021-11-02 ASSESSMENT — PAIN DESCRIPTION - PAIN TYPE
TYPE: ACUTE PAIN

## 2021-11-02 ASSESSMENT — FIBROSIS 4 INDEX: FIB4 SCORE: 1.48

## 2021-11-02 NOTE — PROGRESS NOTES
Intensivist Dr. Boyce made aware of vital signs, lab values, patient current condition. Attempt to lower ventilator settings and keep sedation at the same level. Continue to draw labs as ordered. Continue with plan of care and education.

## 2021-11-02 NOTE — PROGRESS NOTES
Critical Care Progress Note    Date of admission  10/31/2021    Chief Complaint  46 y.o. female admitted 10/31/2021 with hyponatremia and acute hypoxemic respiratory failure in the setting of COVID-19    Hospital Course  46 y.o. female admitted 10/31/2021 with HTN, morbid obesity, secondary polycythemia brought in by [facility] with worsening SOB. Rapid COVID test + yesterday and on 3L oxygen was saturating 72%. Pt was altered.    Labs in ER showed Na of 116, altered and hypoxic, cyanotic and in extreme distress  Extremely difficult intubation but pt finally intubated with 6.0 ET tube  Transferred to the ICU for further management - per Dr Juares admit H&P 10/31    Interval Problem Update  Chart review from the past 24 hours includes imaging, laboratory studies, vital signs and notes available.  Pertinent data for today's visit includes    Cardiac: weaned off levophed  Pulm: SCMV 22/440/18/50%, 7.40/51/74. CXR mild improvement  Neuro: propofol @10, fentanyl, precedex  Heme: chronic polycythemia stable, leukocytosis resolved  I/O: MULU improving  ID:  102.2F. COVID + 10/30. Hydrocortisone 100mg Q8H. Received tocilizumab 11/1.  GI/endo: NGT in place, sugars at goal  Labs/Imaging: Na 119 -> 135 despite stopping 3% saline, starting D5W and increasing to 100cc/hr  Lines: triple lumen IJ all three working again  Mobility: bedrest  Symptoms: Unable to assess due to intubation    Review of Systems  Review of Systems   Unable to perform ROS: Intubated      Vital Signs for last 24 hours   Temp:  [36.2 °C (97.1 °F)-37.2 °C (99 °F)] 36.2 °C (97.1 °F)  Pulse:  [52-69] 63  Resp:  [0-22] 21  BP: (101-162)/(47-89) 133/60  SpO2:  [93 %-97 %] 93 %    Respiratory Information for the last 24 hours  Vent Mode: (S)CMV  Rate (breaths/min): 22  Vt Target (mL): 440  PEEP/CPAP: 18  MAP: 23  Control VTE (exp VT): 450    Physical Exam   Physical Exam  Vitals and nursing note reviewed.   Constitutional:       General: She is not in acute distress.      Appearance: She is well-developed. She is obese. She is ill-appearing. She is not diaphoretic.   HENT:      Mouth/Throat:      Comments: ETT in place, 26cm at teeth  Eyes:      General: No scleral icterus.        Right eye: No discharge.         Left eye: No discharge.   Neck:      Thyroid: No thyromegaly.      Vascular: No JVD.   Cardiovascular:      Rate and Rhythm: Regular rhythm. Tachycardia present.      Heart sounds: Normal heart sounds. No murmur heard.   No gallop.    Pulmonary:      Effort: No respiratory distress.      Breath sounds: Normal breath sounds. No wheezing or rales.      Comments: Mechanical breath sounds  Abdominal:      General: There is distension.      Palpations: Abdomen is soft.      Tenderness: There is no abdominal tenderness. There is no guarding.   Musculoskeletal:         General: No tenderness.      Cervical back: No rigidity.   Lymphadenopathy:      Cervical: No cervical adenopathy.   Skin:     General: Skin is warm.      Capillary Refill: Capillary refill takes less than 2 seconds.      Findings: No erythema or rash.   Neurological:      General: No focal deficit present.      Mental Status: She is alert.      Cranial Nerves: No cranial nerve deficit.      Sensory: No sensory deficit.      Motor: No abnormal muscle tone.      Comments: Agitated, pulling at tubes/lines   Psychiatric:         Behavior: Behavior normal.       Medications  Current Facility-Administered Medications   Medication Dose Route Frequency Provider Last Rate Last Admin   • fentaNYL (SUBLIMAZE) 50 mcg/mL in 50mL   Intravenous Continuous Deniz Boyce M.D. 6 mL/hr at 11/02/21 0551 300 mcg/hr at 11/02/21 0551   • dexmedetomidine (Precedex) 400 mcg in  mL infusion  0.1-1.5 mcg/kg/hr (Order-Specific) Intravenous Continuous Deniz Boyce M.D. 13.5 mL/hr at 11/02/21 0800 0.6 mcg/kg/hr at 11/02/21 0800   • Pharmacy Consult: Enteral tube insertion - review meds/change route/product selection  1 Each Other  PHARMACY TO DOSE Nicky Marrufo R.D.       • ondansetron (ZOFRAN ODT) dispertab 4 mg  4 mg Enteral Tube Q6HRS PRN Deniz Boyce M.D.       • dextrose 5% infusion   Intravenous Continuous Edmundo Esquivel D.O. 100 mL/hr at 11/02/21 0619 New Bag at 11/02/21 0619   • ondansetron (ZOFRAN) syringe/vial injection 4 mg  4 mg Intravenous Q6HRS PRN Alan Rios M.D.       • hydrALAZINE (APRESOLINE) injection 10 mg  10 mg Intravenous Q6HRS PRN Alan Rios M.D.       • heparin injection 5,000 Units  5,000 Units Subcutaneous Q8HRS Alan Rios M.D.   5,000 Units at 11/02/21 0524   • acetaminophen (Tylenol) tablet 650 mg  650 mg Enteral Tube Q6HRS PRN Alan Rios M.D.   650 mg at 11/02/21 0813   • insulin regular (HumuLIN R,NovoLIN R) injection  3-14 Units Subcutaneous Q6HRS Alan Rios M.D.   3 Units at 11/02/21 0531    And   • glucose 4 g chewable tablet 16 g  16 g Oral Q15 MIN PRES Rios M.D.        And   • dextrose 50% (D50W) injection 50 mL  50 mL Intravenous Q15 MIN PRN Alan Rios M.D.       • Respiratory Therapy Consult   Nebulization Continuous RT Alan Rios M.D.       • famotidine (PEPCID) tablet 20 mg  20 mg Enteral Tube Q12HRS Alan Rios M.D.   20 mg at 11/02/21 0524    Or   • famotidine (PEPCID) injection 20 mg  20 mg Intravenous Q12HRS Alan Rios M.D.   20 mg at 11/01/21 1727   • senna-docusate (PERICOLACE or SENOKOT S) 8.6-50 MG per tablet 2 Tablet  2 Tablet Enteral Tube BID Alan Rios M.D.   2 Tablet at 11/02/21 0523    And   • polyethylene glycol/lytes (MIRALAX) PACKET 1 Packet  1 Packet Enteral Tube QDAY PRES Rios M.D.        And   • magnesium hydroxide (MILK OF MAGNESIA) suspension 30 mL  30 mL Enteral Tube QDAY PRES Rios M.D.        And   • bisacodyl (DULCOLAX) suppository 10 mg  10 mg Rectal QDAY PRES Rios M.D.       • MD Alert...ICU  Electrolyte Replacement per Pharmacy   Other PHARMACY TO DOSE Alan Rios M.D.       • lidocaine (XYLOCAINE) 1 % injection 2 mL  2 mL Tracheal Tube Q30 MIN PRN Alan Rios M.D.       • propofol (DIPRIVAN) injection  0-80 mcg/kg/min Intravenous Continuous Alan Rios M.D. 8.2 mL/hr at 11/02/21 0322 10 mcg/kg/min at 11/02/21 0322   • hydrocortisone sodium succinate PF (Solu-CORTEF) 100 MG injection 100 mg  100 mg Intravenous Q8HRS Alan Rios M.D.   100 mg at 11/02/21 0523   • norepinephrine (Levophed) 8 mg in 250 mL NS infusion (premix)  0-30 mcg/min Intravenous Continuous Alan Rios M.D.   Paused at 11/02/21 0414       Fluids    Intake/Output Summary (Last 24 hours) at 11/2/2021 0853  Last data filed at 11/2/2021 0800  Gross per 24 hour   Intake 2350.5 ml   Output 3420 ml   Net -1069.5 ml       Laboratory  Recent Labs     10/31/21  2038 11/01/21  0503 11/02/21  0541   ISTATAPH 7.284* 7.270* 7.406   ISTATAPCO2 68.5* 67.4* 51.0*   ISTATAPO2 81 127* 74   ISTATATCO2 35* 33 34*   [id number] 94 98 94   ISTATARTHCO3 32.5* 30.9* 32.0*   ISTATARTBE 2 1 5*   ISTATTEMP 37.8 C 37.0 C 97.4 F   ISTATFIO2  --   --  50   ISTATSPEC Arterial Arterial Arterial   ISTATAPHTC 7.272* 7.270* 7.415   [id number] 85 127* 71         Recent Labs     10/31/21  1701 10/31/21  2100 11/01/21  0310 11/01/21  0755 11/01/21  2145 11/02/21  0206 11/02/21  0530   SODIUM 116*   < > 119*   < > 131* 131* 135   POTASSIUM 4.0   < > 5.0   < > 4.9 5.1 5.3   CHLORIDE 76*   < > 84*   < > 94* 93* 96   CO2 25   < > 23   < > 29 26 30   BUN 12   < > 16   < > 18 19 19   CREATININE 0.84   < > 1.17   < > 0.96 1.11 1.00   MAGNESIUM 1.4*  --  1.6  --   --   --  2.3   PHOSPHORUS 2.7  --  4.4  --   --   --  3.5   CALCIUM 7.9*   < > 6.9*   < > 8.0* 8.1* 8.1*    < > = values in this interval not displayed.     Recent Labs     10/31/21  1701 10/31/21  1701 11/01/21  0310 11/01/21  1110 11/01/21  1112 11/01/21  2141  11/02/21  0206 11/02/21  0530   ALTSGPT 16  --  13  --   --   --   --   --    ASTSGOT 33  --  28  --   --   --   --   --    ALKPHOSPHAT 107*  --  78  --   --   --   --   --    TBILIRUBIN 1.3  --  0.7  --   --   --   --   --    PREALBUMIN  --   --   --  4.4*  --   --   --   --    GLUCOSE 183*   < > 192*  --    < > 195* 213* 176*    < > = values in this interval not displayed.     Recent Labs     10/31/21  1701 11/01/21  0310 11/02/21  0530   WBC 10.0 10.9* 4.6*   NEUTSPOLYS 76.20* 86.40* 67.50   LYMPHOCYTES 12.00* 5.30* 17.00*   MONOCYTES 11.00 7.60 15.10*   EOSINOPHILS 0.00 0.00 0.00   BASOPHILS 0.30 0.10 0.00   ASTSGOT 33 28  --    ALTSGPT 16 13  --    ALKPHOSPHAT 107* 78  --    TBILIRUBIN 1.3 0.7  --      Recent Labs     10/31/21  1701 11/01/21  0310 11/02/21  0530   RBC 7.52* 6.85* 6.93*   HEMOGLOBIN 16.7* 15.1 15.4   HEMATOCRIT 57.8* 53.5* 54.1*   PLATELETCT 203 268 246   PROTHROMBTM 14.2  --   --    APTT 38.3*  --   --    INR 1.19*  --   --      Imaging  X-Ray:  I have personally reviewed the images and compared with prior images.  CXR ETT in upper trachea, left IJ CVC terminating in lower SVC, patchy bilateral infiltrates in RUL and LLL    Assessment/Plan    * Acute hypoxemic respiratory failure due to COVID-19 (HCC)- (present on admission)  Assessment & Plan  Intubated 10/31/21, 6.0 ETT, challenging intubation  SCMV 22/440/18/80%, 7.27/67/127   P/F > 150, no proning  Cont hydrocortisone 100 mg q 8  Meets criteria for tocilimuzab, give dose x 1 11/1/2021   Strict droplet, contact and  Eye protection  Add precedex to sedation regimen  Start TF    Septic shock (HCC)  Assessment & Plan  This is Septic shock Present on admission  SIRS criteria identified on my evaluation include: Fever, with temperature greater than 101 deg F and Tachypnea, with respirations greater than 20 per minute  Source is COVID pneumonia  Presentation includes: Severe sepsis present and persistent hypotension after 30 ml/kg completed.    Despite appropriate fluid resuscitation with crystalloid given per sepsis guidelines, the patient remains hypotensive with systolic blood pressure less than 90 or MAP less than 65  Hemodynamic support with additional fluids and IV vasopressors as needed to maintain a SBP of 90 or MAP of 65  IV antibiotics as appropriate for source of sepsis  Reassessment: I have reassessed the patient's hemodynamic status    Septic shock 2/2 covid, improving, off pressors  TTE 10/18: mild pHTN, EF 65%  Cont precedex, fentanyl, minimize propofol    Hyponatremia- (present on admission)  Assessment & Plan  Critical, unclear if acute/chronic, no labs since 2019  Improving, Na 119 -> 135 despite stopping 3% saline, starting D5W and increasing to 200cc/hr  cont Na check BMP q4H, goal low 130s by end of day  Amira < 20 and Uosm > 300 consistent with SIADH    Encephalopathy acute- (present on admission)  Assessment & Plan  Due to covid pna and hyponatremia which is severe  Slow correction of 8 meq/24 hrs of Na  Checking q 4    MULU (acute kidney injury) ([facility])  Assessment & Plan  MULU vs CKD, no labs since 2019, unclear baseline  Avoid nephrotoxins  Clinically euvolemic however hard to assess due to body habitus    Morbid obesity with BMI of 50.0-59.9, adult ([facility])  Assessment & Plan  Comorbidity complicating ventilator synchrony, mechanics  Also associated with severe disease in COVID    Polycythemia vera (HCC)- (present on admission)  Assessment & Plan  Likely secondary from nocturnal and maybe daytime hypoxemia  No indication for therapeutic phlebotomy      VTE:  Heparin  Ulcer: H2 Antagonist  Lines: Central Line  Ongoing indication addressed and Orr Catheter  Ongoing indication addressed    I have performed a physical exam and reviewed and updated ROS and Plan today (11/2/2021). In review of yesterday's note (11/1/2021), there are no changes except as documented above.     Discussed patient condition and risk of morbidity and/or mortality  with RN, RT, Pharmacy and Charge nurse / hot rounds     The patient remains critically ill.  Critical care time = 55 minutes in directly providing and coordinating critical care and extensive data review.  No time overlap and excludes procedures.    This note was generated using voice recognition software which has a chance of producing errors of grammar and content.  I have made every reasonable attempt to find and correct any errors, but it should be expected that some may not be found prior to finalization of this note.  __________  Deniz Boyce MD  Pulmonary and Critical Care Medicine  [facility]

## 2021-11-02 NOTE — CARE PLAN
The patient is Watcher - Medium risk of patient condition declining or worsening    Shift Goals  Clinical Goals: wean off precedex  Patient Goals: comfort    Progress made toward(s) clinical / shift goals:    Problem: Hemodynamics  Goal: Patient's hemodynamics, fluid balance and neurologic status will be stable or improve  Outcome: Progressing  Note: Pt has been off of vasopressors this morning and has maintained an adequate MAP and BP.      Problem: Urinary - Renal Perfusion  Goal: Ability to achieve and maintain adequate renal perfusion and functioning will improve  Outcome: Progressing  Note: Pt has had great urine output throughout shift.        Patient is not progressing towards the following goals:  Problem: Mechanical Ventilation  Goal: Successful weaning off mechanical ventilator, spontaneously maintains adequate gas exchange  Outcome: Not Progressing  Note: Pt has stable and adequate O2 saturations, but has not decreased in vent support.

## 2021-11-02 NOTE — PROGRESS NOTES
Notified Dr. Esquivel about patient's most recent Na of 131 and the increase of Na in the past 24 hours. Per Dr. Esquivel, D5W increased to 100 mL/hr.

## 2021-11-02 NOTE — PROGRESS NOTES
Patient continues to have a fever post-tylenol, continue to place ice packs on patient and cooling blanket also placed on patient.

## 2021-11-02 NOTE — PROGRESS NOTES
Report received by RN. Lines, gtts and ventilator settings verified. Patient in soft wrist restraints. All safety precautions in place.

## 2021-11-03 PROBLEM — A41.9 SEPTIC SHOCK (HCC): Status: RESOLVED | Noted: 2021-11-01 | Resolved: 2021-11-03

## 2021-11-03 PROBLEM — R65.21 SEPTIC SHOCK (HCC): Status: RESOLVED | Noted: 2021-11-01 | Resolved: 2021-11-03

## 2021-11-03 LAB
ANION GAP SERPL CALC-SCNC: 6 MMOL/L (ref 7–16)
ANION GAP SERPL CALC-SCNC: 6 MMOL/L (ref 7–16)
ANISOCYTOSIS BLD QL SMEAR: ABNORMAL
BACTERIA UR CULT: NORMAL
BASOPHILS # BLD AUTO: 0 % (ref 0–1.8)
BASOPHILS # BLD: 0 K/UL (ref 0–0.12)
BUN SERPL-MCNC: 18 MG/DL (ref 8–22)
BUN SERPL-MCNC: 19 MG/DL (ref 8–22)
CALCIUM SERPL-MCNC: 8.2 MG/DL (ref 8.4–10.2)
CALCIUM SERPL-MCNC: 8.3 MG/DL (ref 8.4–10.2)
CHLORIDE SERPL-SCNC: 95 MMOL/L (ref 96–112)
CHLORIDE SERPL-SCNC: 95 MMOL/L (ref 96–112)
CO2 SERPL-SCNC: 32 MMOL/L (ref 20–33)
CO2 SERPL-SCNC: 32 MMOL/L (ref 20–33)
CREAT SERPL-MCNC: 0.74 MG/DL (ref 0.5–1.4)
CREAT SERPL-MCNC: 0.85 MG/DL (ref 0.5–1.4)
EOSINOPHIL # BLD AUTO: 0 K/UL (ref 0–0.51)
EOSINOPHIL NFR BLD: 0 % (ref 0–6.9)
ERYTHROCYTE [DISTWIDTH] IN BLOOD BY AUTOMATED COUNT: 52.5 FL (ref 35.9–50)
FLUAV RNA SPEC QL NAA+PROBE: NEGATIVE
FLUBV RNA SPEC QL NAA+PROBE: NEGATIVE
GAMMA INTERFERON BACKGROUND BLD IA-ACNC: 0.07 IU/ML
GLUCOSE BLD-MCNC: 138 MG/DL (ref 65–99)
GLUCOSE BLD-MCNC: 164 MG/DL (ref 65–99)
GLUCOSE BLD-MCNC: 192 MG/DL (ref 65–99)
GLUCOSE SERPL-MCNC: 141 MG/DL (ref 65–99)
GLUCOSE SERPL-MCNC: 175 MG/DL (ref 65–99)
HCT VFR BLD AUTO: 55.4 % (ref 37–47)
HGB BLD-MCNC: 16 G/DL (ref 12–16)
LYMPHOCYTES # BLD AUTO: 1.5 K/UL (ref 1–4.8)
LYMPHOCYTES NFR BLD: 16 % (ref 22–41)
M TB IFN-G BLD-IMP: NEGATIVE
M TB IFN-G CD4+ BCKGRND COR BLD-ACNC: -0.01 IU/ML
MACROCYTES BLD QL SMEAR: ABNORMAL
MAGNESIUM SERPL-MCNC: 1.8 MG/DL (ref 1.5–2.5)
MANUAL DIFF BLD: NORMAL
MCH RBC QN AUTO: 22.5 PG (ref 27–33)
MCHC RBC AUTO-ENTMCNC: 28.9 G/DL (ref 33.6–35)
MCV RBC AUTO: 78 FL (ref 81.4–97.8)
MITOGEN IGNF BCKGRD COR BLD-ACNC: 1.08 IU/ML
MONOCYTES # BLD AUTO: 0.66 K/UL (ref 0–0.85)
MONOCYTES NFR BLD AUTO: 7 % (ref 0–13.4)
NEUTROPHILS # BLD AUTO: 7.24 K/UL (ref 2–7.15)
NEUTROPHILS NFR BLD: 73 % (ref 44–72)
NEUTS BAND NFR BLD MANUAL: 4 % (ref 0–10)
NRBC # BLD AUTO: 0 K/UL
NRBC BLD-RTO: 0 /100 WBC
PHOSPHATE SERPL-MCNC: 3.1 MG/DL (ref 2.5–4.5)
PLATELET # BLD AUTO: 250 K/UL (ref 164–446)
PLATELET BLD QL SMEAR: NORMAL
PMV BLD AUTO: 9.5 FL (ref 9–12.9)
POLYCHROMASIA BLD QL SMEAR: NORMAL
POTASSIUM SERPL-SCNC: 4.1 MMOL/L (ref 3.6–5.5)
POTASSIUM SERPL-SCNC: 4.2 MMOL/L (ref 3.6–5.5)
QFT TB2 - NIL TBQ2: 0 IU/ML
RBC # BLD AUTO: 7.1 M/UL (ref 4.2–5.4)
RBC BLD AUTO: PRESENT
RSV RNA SPEC QL NAA+PROBE: NEGATIVE
SARS-COV-2 RNA RESP QL NAA+PROBE: DETECTED
SIGNIFICANT IND 70042: NORMAL
SITE SITE: NORMAL
SODIUM SERPL-SCNC: 133 MMOL/L (ref 135–145)
SODIUM SERPL-SCNC: 133 MMOL/L (ref 135–145)
SOURCE SOURCE: NORMAL
SPECIMEN SOURCE: ABNORMAL
WBC # BLD AUTO: 9.4 K/UL (ref 4.8–10.8)

## 2021-11-03 PROCEDURE — A9270 NON-COVERED ITEM OR SERVICE: HCPCS | Performed by: INTERNAL MEDICINE

## 2021-11-03 PROCEDURE — 82962 GLUCOSE BLOOD TEST: CPT | Mod: 91

## 2021-11-03 PROCEDURE — 5A09357 ASSISTANCE WITH RESPIRATORY VENTILATION, LESS THAN 24 CONSECUTIVE HOURS, CONTINUOUS POSITIVE AIRWAY PRESSURE: ICD-10-PCS | Performed by: INTERNAL MEDICINE

## 2021-11-03 PROCEDURE — 80048 BASIC METABOLIC PNL TOTAL CA: CPT | Mod: 91

## 2021-11-03 PROCEDURE — 700105 HCHG RX REV CODE 258

## 2021-11-03 PROCEDURE — 700111 HCHG RX REV CODE 636 W/ 250 OVERRIDE (IP): Performed by: INTERNAL MEDICINE

## 2021-11-03 PROCEDURE — 94640 AIRWAY INHALATION TREATMENT: CPT

## 2021-11-03 PROCEDURE — 700102 HCHG RX REV CODE 250 W/ 637 OVERRIDE(OP): Performed by: INTERNAL MEDICINE

## 2021-11-03 PROCEDURE — 85007 BL SMEAR W/DIFF WBC COUNT: CPT

## 2021-11-03 PROCEDURE — 83735 ASSAY OF MAGNESIUM: CPT

## 2021-11-03 PROCEDURE — 700111 HCHG RX REV CODE 636 W/ 250 OVERRIDE (IP): Performed by: HOSPITALIST

## 2021-11-03 PROCEDURE — 99291 CRITICAL CARE FIRST HOUR: CPT | Performed by: INTERNAL MEDICINE

## 2021-11-03 PROCEDURE — 94760 N-INVAS EAR/PLS OXIMETRY 1: CPT

## 2021-11-03 PROCEDURE — 770022 HCHG ROOM/CARE - ICU (200)

## 2021-11-03 PROCEDURE — 700105 HCHG RX REV CODE 258: Performed by: INTERNAL MEDICINE

## 2021-11-03 PROCEDURE — 700101 HCHG RX REV CODE 250: Performed by: INTERNAL MEDICINE

## 2021-11-03 PROCEDURE — 700101 HCHG RX REV CODE 250

## 2021-11-03 PROCEDURE — 92610 EVALUATE SWALLOWING FUNCTION: CPT

## 2021-11-03 PROCEDURE — 94660 CPAP INITIATION&MGMT: CPT

## 2021-11-03 PROCEDURE — 0241U HCHG SARS-COV-2 COVID-19 NFCT DS RESP RNA 4 TRGT MIC: CPT

## 2021-11-03 PROCEDURE — 84100 ASSAY OF PHOSPHORUS: CPT

## 2021-11-03 PROCEDURE — 85027 COMPLETE CBC AUTOMATED: CPT

## 2021-11-03 RX ORDER — AMLODIPINE BESYLATE 5 MG/1
5 TABLET ORAL ONCE
Status: COMPLETED | OUTPATIENT
Start: 2021-11-03 | End: 2021-11-03

## 2021-11-03 RX ORDER — METOPROLOL TARTRATE 50 MG/1
100 TABLET, FILM COATED ORAL TWICE DAILY
Status: DISCONTINUED | OUTPATIENT
Start: 2021-11-03 | End: 2021-11-04

## 2021-11-03 RX ORDER — BUDESONIDE AND FORMOTEROL FUMARATE DIHYDRATE 160; 4.5 UG/1; UG/1
2 AEROSOL RESPIRATORY (INHALATION)
Status: DISCONTINUED | OUTPATIENT
Start: 2021-11-03 | End: 2021-11-07 | Stop reason: HOSPADM

## 2021-11-03 RX ORDER — HYDRALAZINE HYDROCHLORIDE 20 MG/ML
10 INJECTION INTRAMUSCULAR; INTRAVENOUS EVERY 6 HOURS PRN
Status: DISCONTINUED | OUTPATIENT
Start: 2021-11-03 | End: 2021-11-05

## 2021-11-03 RX ORDER — MAGNESIUM SULFATE HEPTAHYDRATE 40 MG/ML
2 INJECTION, SOLUTION INTRAVENOUS ONCE
Status: COMPLETED | OUTPATIENT
Start: 2021-11-03 | End: 2021-11-03

## 2021-11-03 RX ORDER — DEXMEDETOMIDINE HYDROCHLORIDE 100 UG/ML
INJECTION, SOLUTION INTRAVENOUS
Status: COMPLETED
Start: 2021-11-03 | End: 2021-11-03

## 2021-11-03 RX ORDER — FELODIPINE 5 MG/1
10 TABLET, EXTENDED RELEASE ORAL
Status: DISCONTINUED | OUTPATIENT
Start: 2021-11-03 | End: 2021-11-03

## 2021-11-03 RX ORDER — AMLODIPINE BESYLATE 5 MG/1
10 TABLET ORAL
Status: DISCONTINUED | OUTPATIENT
Start: 2021-11-04 | End: 2021-11-04

## 2021-11-03 RX ORDER — HYDRALAZINE HYDROCHLORIDE 20 MG/ML
10 INJECTION INTRAMUSCULAR; INTRAVENOUS ONCE
Status: COMPLETED | OUTPATIENT
Start: 2021-11-03 | End: 2021-11-03

## 2021-11-03 RX ADMIN — HYDRALAZINE HYDROCHLORIDE 10 MG: 20 INJECTION INTRAMUSCULAR; INTRAVENOUS at 19:20

## 2021-11-03 RX ADMIN — HEPARIN SODIUM 5000 UNITS: 5000 INJECTION, SOLUTION INTRAVENOUS; SUBCUTANEOUS at 06:03

## 2021-11-03 RX ADMIN — HYDRALAZINE HYDROCHLORIDE 10 MG: 20 INJECTION INTRAMUSCULAR; INTRAVENOUS at 02:32

## 2021-11-03 RX ADMIN — HYDRALAZINE HYDROCHLORIDE 10 MG: 20 INJECTION INTRAMUSCULAR; INTRAVENOUS at 06:33

## 2021-11-03 RX ADMIN — SENNOSIDES AND DOCUSATE SODIUM 2 TABLET: 50; 8.6 TABLET ORAL at 17:23

## 2021-11-03 RX ADMIN — HYDROCORTISONE SODIUM SUCCINATE 100 MG: 100 INJECTION, POWDER, FOR SOLUTION INTRAMUSCULAR; INTRAVENOUS at 23:11

## 2021-11-03 RX ADMIN — SENNOSIDES AND DOCUSATE SODIUM 2 TABLET: 50; 8.6 TABLET ORAL at 06:03

## 2021-11-03 RX ADMIN — HEPARIN SODIUM 5000 UNITS: 5000 INJECTION, SOLUTION INTRAVENOUS; SUBCUTANEOUS at 14:36

## 2021-11-03 RX ADMIN — MAGNESIUM SULFATE 2 G: 2 INJECTION INTRAVENOUS at 09:21

## 2021-11-03 RX ADMIN — DEXMEDETOMIDINE 1.5 MCG/KG/HR: 200 INJECTION, SOLUTION INTRAVENOUS at 02:57

## 2021-11-03 RX ADMIN — DEXTROSE MONOHYDRATE 50 ML: 50 INJECTION, SOLUTION INTRAVENOUS at 07:54

## 2021-11-03 RX ADMIN — HYDROCORTISONE SODIUM SUCCINATE 100 MG: 100 INJECTION, POWDER, FOR SOLUTION INTRAMUSCULAR; INTRAVENOUS at 14:36

## 2021-11-03 RX ADMIN — METOPROLOL TARTRATE 100 MG: 50 TABLET, FILM COATED ORAL at 10:48

## 2021-11-03 RX ADMIN — INSULIN HUMAN 3 UNITS: 100 INJECTION, SOLUTION PARENTERAL at 11:35

## 2021-11-03 RX ADMIN — FAMOTIDINE 20 MG: 10 INJECTION INTRAVENOUS at 06:03

## 2021-11-03 RX ADMIN — DEXMEDETOMIDINE HYDROCHLORIDE 1.5 MCG/KG/HR: 100 INJECTION, SOLUTION, CONCENTRATE INTRAVENOUS at 06:39

## 2021-11-03 RX ADMIN — DEXMEDETOMIDINE HYDROCHLORIDE 1.5 MCG/KG/HR: 100 INJECTION, SOLUTION, CONCENTRATE INTRAVENOUS at 09:49

## 2021-11-03 RX ADMIN — HYDROCORTISONE SODIUM SUCCINATE 100 MG: 100 INJECTION, POWDER, FOR SOLUTION INTRAMUSCULAR; INTRAVENOUS at 06:03

## 2021-11-03 RX ADMIN — AMLODIPINE BESYLATE 5 MG: 5 TABLET ORAL at 12:34

## 2021-11-03 RX ADMIN — METOPROLOL TARTRATE 100 MG: 50 TABLET, FILM COATED ORAL at 17:23

## 2021-11-03 RX ADMIN — HEPARIN SODIUM 5000 UNITS: 5000 INJECTION, SOLUTION INTRAVENOUS; SUBCUTANEOUS at 23:11

## 2021-11-03 RX ADMIN — ACETAMINOPHEN 650 MG: 325 TABLET ORAL at 04:22

## 2021-11-03 RX ADMIN — INSULIN HUMAN 3 UNITS: 100 INJECTION, SOLUTION PARENTERAL at 17:29

## 2021-11-03 RX ADMIN — HYDRALAZINE HYDROCHLORIDE 10 MG: 20 INJECTION INTRAMUSCULAR; INTRAVENOUS at 23:41

## 2021-11-03 RX ADMIN — FELODIPINE 5 MG: 5 TABLET, EXTENDED RELEASE ORAL at 11:29

## 2021-11-03 ASSESSMENT — ENCOUNTER SYMPTOMS
DIZZINESS: 0
EYE PAIN: 0
FEVER: 0
SORE THROAT: 0
HEADACHES: 0
ABDOMINAL PAIN: 0
MYALGIAS: 0
FOCAL WEAKNESS: 0
VOMITING: 0
PSYCHIATRIC NEGATIVE: 1
LOSS OF CONSCIOUSNESS: 0
DIARRHEA: 0
COUGH: 0
ORTHOPNEA: 0
WHEEZING: 0
SHORTNESS OF BREATH: 0
SPUTUM PRODUCTION: 0
BACK PAIN: 1
STRIDOR: 0
CHILLS: 0
WEIGHT LOSS: 0
NAUSEA: 0
SINUS PAIN: 0
SENSORY CHANGE: 0
EYE DISCHARGE: 0

## 2021-11-03 ASSESSMENT — PAIN DESCRIPTION - PAIN TYPE
TYPE: ACUTE PAIN

## 2021-11-03 ASSESSMENT — PULMONARY FUNCTION TESTS: EPAP_CMH2O: 8

## 2021-11-03 NOTE — DIETARY
Nutrition Services: Brief Update    Pt previously with catherine in and on TF. Catherine observed out, TF discontinued. SLP saw today and recommends pureed diet with thin liquids. One snack recorded with % meals.     Recommendations/Plan:  1. Diet per SLP with no additional restrictions to liberalize diet.  2. Monitor blood sugars, if remains elevated consider diabetic diet addition once diet advanced past pureed.  3. Encourage PO intake and monitor weight trend.    RD following.

## 2021-11-03 NOTE — FLOWSHEET NOTE
11/02/21 1707   Events/Summary/Plan   Events/Summary/Plan Patient self extubated.  Patient placed on HFNC 60/100%.   Vital Signs   Pulse (!) 112   Respiration (!) 31   Pulse Oximetry 99 %   $ Pulse Oximetry (Spot Check) Yes   Respiratory Assessment   Level of Consciousness   (confused)   Chest Exam   Work Of Breathing / Effort Mild;Shallow   Oxygen   O2 (LPM) 60   FiO2% 100 %   O2 Delivery Device Heated High Flow Nasal Cannula   Aerosols   $ Aerosol Delivery Device Heated High Flow Nasal Cannula   Aerosol Temperature 30 °C (86 °F)   Equipment Change Date 11/09/21

## 2021-11-03 NOTE — THERAPY
"Speech Language Pathology   Clinical Swallow Evaluation     Patient Name: Adri Maldonado  AGE:  46 y.o., SEX:  female  Medical Record #: [id number]  Today's Date: 11/3/2021     Precautions  Precautions: (P) Fall Risk, Swallow Precautions ( See Comments)    Assessment    Patient is 46 y.o. female admitted 10/31/2021 with hyponatremia and acute hypoxemic respiratory failure in the setting of COVID-19. Patient intubated x2 days. No previous hx with SLP at [facility]. Patient denies any hx of dysphagia.    Patient participated in clinical swallow evaluation this date.     Pertinent Information    Respiratory status: Oxygen: 60 liters/minute via high flow nasal cannula at 75% FiO2  PO trials: ice chips, thin liquids, mildly thick liquids, liquidized and soft and bite sized   Behavioral observations: Alert, Cooperative and A&Ox4  Drooling/excess secretions: Within Normal Limits    Oral Mechanism Exam:    Patient with symmetrical facial features. Tongue protrusion to midline. ROM and strength of oral musculature found to be WFL.     Oral/Pharyngeal phase:    Patient presented with adequate oral acceptance and containment. Patient reported difficulty with AP transit of ice chips likely in relation to change in intraoral pressure d/t high flow nasal cannula. Patient consumed thin liquids, mildly thick liquids, and liquidized textures with no overt s/sx of aspiration. Soft and bite sized diet was consumed with prolonged mastication and significant feelings of globus sensation. The patient \"hawked up\" and attempted to digitally clear bolus from the pharynx. Multiple attempts at liquid wash were attempted before patient felt that peach had cleared. Patient reported similar symptoms this morning when provided a whole pill. At this time, patient does not appear safe for solid textures. No coughing or change in vocal quality was appreciated across consistencies.     Clinical Impressions:    Clinical signs of pharyngeal dysphagia, " likely acute related to high flow requirements with respiratory failure, COVID+. Swallow prognosis is excellent. Instrumental swallow study is indicated should s/sx of aspiration persist. Patient is safe for oral diet prior to instrumental swallow study.     Recommendations:    Initiation of diet puree with thin liquids.     Swallowing instructions/precautions:   Recommendations: Direct swallowing treatment  Supervision: Close supervision, patient may be left alone for less than 5 minutes at a time  Positioning: Seat fully upright and midline when eating  Medication: Crushed in puree  Swallow Techniques: small bites and sips, no straws and slow rate of pace  Oral Cares: Do oral cares before each meal    Plan    Recommend Speech Therapy 3 times per week until therapy goals are met for the following treatments:  Dysphagia Training.    Discharge Recommendations: Recommend post-acute placement for additional speech therapy services prior to discharge home     Objective       11/03/21 1050   Oral Motor Eval    Is Patient Able to Complete Oral Motor Eval Yes, Within Normal Limits   Laryngeal Function   Voice Quality Within Functional Limits   Volutional Cough Within Functional Limits   Excursion Upon Swallow Complete   Oral Food Presentation   Ice Chips Minimal   Single Swallow Mildly Thick (2) - (Nectar Thick)  Within Functional Limits   Serial Swallow Mildly Thick (2) - (Nectar Thick) Within Functional Limits   Single Swallow Thin (0) Within Functional Limits   Serial Swallow Thin (0) Minimal   Liquidised (3) Within Functional Limits   Soft & Bite-Sized (6) - (Dysphagia III) Moderate   Self Feeding Independent   Tracheostomy   Tracheostomy  No   Dysphagia Strategies / Recommendations   Strategies / Interventions Recommended (Yes / No) Yes   Compensatory Strategies Direct Supervision During Meals;Head of Bed 90 Degrees During Eating / Drinking;No Straws;Single Sips / Bites   Diet / Liquid Recommendation Thin (0);Puree  "(4)   Medication Administration  Crush all Medications in Puree   Therapy Interventions Dysphagia Therapy By Speech Language Pathologist   Dysphagia Rating   Nutritional Liquid Intake Rating Scale Non thickened beverages   Nutritional Food Intake Rating Scale Total oral diet of a single consistency   Patient / Family Goals   Patient / Family Goal #1 \"this water is so good\"   Short Term Goals   Short Term Goal # 1 The patient will consume PU4/TN0 meal with no overt s/sx of aspiration          "

## 2021-11-03 NOTE — PROGRESS NOTES
1630 this RN in patient's room, patient alert with RASS of 0 as per MD request to keep patient on the same dose of sedation. Patient attempts to speak, education delivered to patient regarding condition and plan of care. Patient moves around hands but cannot lift them due to soft wrist restraints, patient continues to be educated.  1700 patient's hr increases noted on monitor, upon assessment noted that ETT and OGT are out. Ambu bagging patient, called for help, MD  In room. Sedation stopped. Patient placed on Hi Brandt and encouraged to take deep breaths. MD orders to increase the precedex to 1.5mc/kg/min.   1745 patient's vitals stabilized on hiflo saturating 95% with HR <100bpm. Patient alert and speaking to this RN. MD aware of current vitals and status. Continue current plan of care

## 2021-11-03 NOTE — PROGRESS NOTES
Report received by RN. Lines and gtts verified. Pt is on high flow nasal cannula with O2 sats in the 90s. All safety precautions in place. Will continue to monitor.

## 2021-11-03 NOTE — CARE PLAN
Problem: Nutritional:  Goal: Achieve adequate nutritional intake  Description: Patient will consume 50% of meals  Outcome: Not Met     Problem: Nutritional:  Goal: Nutrition support tolerated and meeting greater than 85% of estimated needs  Outcome: Discharged - Not Met

## 2021-11-03 NOTE — CARE PLAN
The patient is Watcher - Medium risk of patient condition declining or worsening    Shift Goals  Clinical Goals: Oxygen stability, Mobility   Patient Goals: Mobility EOB at least 20 min   Family Goals: Family not present at this time     Progress made toward(s) clinical / shift goals:    Problem: Knowledge Deficit - Standard  Goal: Patient and family/care givers will demonstrate understanding of plan of care, disease process/condition, diagnostic tests and medications  Outcome: Progressing     Problem: Psychosocial  Goal: Patient's level of anxiety will decrease  Outcome: Progressing  Goal: Patient's ability to verbalize feelings about condition will improve  Outcome: Progressing  Goal: Patient's ability to re-evaluate and adapt role responsibilities will improve  Outcome: Progressing     Problem: Communication  Goal: The ability to communicate needs accurately and effectively will improve  Outcome: Progressing     Problem: Respiratory  Goal: Patient will achieve/maintain optimum respiratory ventilation and gas exchange  Outcome: Progressing

## 2021-11-03 NOTE — FLOWSHEET NOTE
Saturations 99%.  Voice harsh.  Patient having a hard time not talking.     11/02/21 1707   Events/Summary/Plan   Events/Summary/Plan Patient self extubated.  Patient placed on HFNC 60/100%.   Ventiliation   Vent Clock Discontinued Yes

## 2021-11-03 NOTE — ASSESSMENT & PLAN NOTE
Cont  BID, amlodipine 10, lisinopril 20, hydralazine 50mg TID  Add clonidine 0.2mg BID  Cont hydralazine, enaliprit PRN SBP > 180

## 2021-11-03 NOTE — PROGRESS NOTES
Critical Care Progress Note    Date of admission  10/31/2021    Chief Complaint  46 y.o. female admitted 10/31/2021 with hyponatremia and acute hypoxemic respiratory failure in the setting of COVID-19    Hospital Course  46 y.o. female admitted 10/31/2021 with HTN, morbid obesity, secondary polycythemia brought in by [facility] with worsening SOB. Rapid COVID test + yesterday and on 3L oxygen was saturating 72%. Pt was altered.    Labs in ER showed Na of 116, altered and hypoxic, cyanotic and in extreme distress  Extremely difficult intubation but pt finally intubated with 6.0 ET tube  Transferred to the ICU for further management - per Dr Juares admit H&P 10/31    Interval Problem Update  Chart review from the past 24 hours includes imaging, laboratory studies, vital signs and notes available.  Pertinent data for today's visit includes    Self extubated 11/2  Managed conservatively on HFNC and has done relatively well overnight    Cardiac: off pressors  Pulm: HFNC 60L/100% 7.41/49/86  Neuro: continued on precedex for agitation  Heme: chronic polycythemia stable, leukocytosis resolved  I/O: MULU resolved  ID:  Persistently febrile, Tmax 101.8F. COVID + 10/30. Hydrocortisone 100mg Q8H. S/p tocilizumab 11/1. Pancultured, NGTD. No abx.  GI/endo: NPO  Labs/Imaging: Na 133, dc d5w  Lines: triple lumen IJ all three working again  Mobility: bedrest  Symptoms: disoriented    Review of Systems  Review of Systems   Constitutional: Negative for chills, fever and weight loss.   HENT: Negative for congestion, sinus pain and sore throat.    Eyes: Negative for pain and discharge.   Respiratory: Negative for cough, sputum production, shortness of breath, wheezing and stridor.    Cardiovascular: Negative for chest pain, orthopnea and leg swelling.   Gastrointestinal: Negative for abdominal pain, diarrhea, nausea and vomiting.   Genitourinary: Negative for dysuria, frequency and urgency.   Musculoskeletal: Positive for back pain. Negative  for myalgias.   Skin: Negative for rash.   Neurological: Negative for dizziness, sensory change, focal weakness, loss of consciousness and headaches.   Psychiatric/Behavioral: Negative.    All other systems reviewed and are negative.     Vital Signs for last 24 hours   Temp:  [36.2 °C (97.2 °F)-38.8 °C (101.8 °F)] 36.2 °C (97.2 °F)  Pulse:  [] 73  Resp:  [8-41] 26  BP: (134-196)/() 160/88  SpO2:  [91 %-99 %] 97 %    Respiratory Information for the last 24 hours  Vent Mode: (S)CMV  Rate (breaths/min): 22  Vt Target (mL): 440  PEEP/CPAP: 14  MAP: 20  Control VTE (exp VT): 440    Physical Exam   Physical Exam  Vitals and nursing note reviewed.   Constitutional:       General: She is not in acute distress.     Appearance: She is well-developed. She is obese. She is ill-appearing. She is not diaphoretic.   Eyes:      General: No scleral icterus.        Right eye: No discharge.         Left eye: No discharge.   Neck:      Thyroid: No thyromegaly.      Vascular: No JVD.   Cardiovascular:      Rate and Rhythm: Regular rhythm. Tachycardia present.      Heart sounds: Normal heart sounds. No murmur heard.   No gallop.    Pulmonary:      Effort: No respiratory distress.      Breath sounds: Normal breath sounds. No wheezing or rales.      Comments: Distant bilateral breath sounds  Abdominal:      General: There is distension.      Palpations: Abdomen is soft.      Tenderness: There is no abdominal tenderness. There is no guarding.   Musculoskeletal:         General: No tenderness.      Cervical back: No rigidity.   Lymphadenopathy:      Cervical: No cervical adenopathy.   Skin:     General: Skin is warm.      Capillary Refill: Capillary refill takes less than 2 seconds.      Findings: No erythema or rash.   Neurological:      General: No focal deficit present.      Mental Status: She is alert and oriented to person, place, and time.      Cranial Nerves: No cranial nerve deficit.      Sensory: No sensory deficit.       Motor: No abnormal muscle tone.   Psychiatric:         Behavior: Behavior normal.       Medications  Current Facility-Administered Medications   Medication Dose Route Frequency Provider Last Rate Last Admin   • metoprolol tartrate (LOPRESSOR) tablet 100 mg  100 mg Oral TWICE DAILY Deniz Boyce M.D.   100 mg at 11/03/21 1048   • budesonide-formoterol (SYMBICORT) 160-4.5 MCG/ACT inhaler 2 Puff  2 Puff Inhalation BID (RT) Deniz Boyce M.D.       • felodipine (PLENDIL) tablet 10 mg  10 mg Oral Q DAY Deniz Boyce M.D.   10 mg at 11/03/21 1129   • Pharmacy Consult: Enteral tube insertion - review meds/change route/product selection  1 Each Other PHARMACY TO DOSE Nicky Marrufo R.D.       • ondansetron (ZOFRAN ODT) dispertab 4 mg  4 mg Enteral Tube Q6HRS PRN Deniz Boyce M.D.       • ondansetron (ZOFRAN) syringe/vial injection 4 mg  4 mg Intravenous Q6HRS PRN Alan Rios M.D.       • hydrALAZINE (APRESOLINE) injection 10 mg  10 mg Intravenous Q6HRS PRES Rios M.D.   10 mg at 11/03/21 0633   • heparin injection 5,000 Units  5,000 Units Subcutaneous Q8HRS Alan Rios M.D.   5,000 Units at 11/03/21 0603   • acetaminophen (Tylenol) tablet 650 mg  650 mg Enteral Tube Q6HRS PRN Alan Rios M.D.   650 mg at 11/03/21 0422   • insulin regular (HumuLIN R,NovoLIN R) injection  3-14 Units Subcutaneous Q6HRS Alan Rios M.D.   3 Units at 11/03/21 1135    And   • glucose 4 g chewable tablet 16 g  16 g Oral Q15 MIN PRES Rios M.D.        And   • dextrose 50% (D50W) injection 50 mL  50 mL Intravenous Q15 MIN PRES Rios M.D.       • Respiratory Therapy Consult   Nebulization Continuous RT Alan Rios M.D.       • senna-docusate (PERICOLACE or SENOKOT S) 8.6-50 MG per tablet 2 Tablet  2 Tablet Enteral Tube BID Alan Rios M.D.   2 Tablet at 11/03/21 0603    And   • polyethylene glycol/lytes (MIRALAX) PACKET 1 Packet   1 Packet Enteral Tube QDAY PRN Alan Rios M.D.        And   • magnesium hydroxide (MILK OF MAGNESIA) suspension 30 mL  30 mL Enteral Tube QDAY PRES Rios M.D.        And   • bisacodyl (DULCOLAX) suppository 10 mg  10 mg Rectal QDAY PRN Alan Rios M.D.       • MD Alert...ICU Electrolyte Replacement per Pharmacy   Other PHARMACY TO DOSE Alan Rios M.D.       • lidocaine (XYLOCAINE) 1 % injection 2 mL  2 mL Tracheal Tube Q30 MIN PRN Alan Rios M.D.       • hydrocortisone sodium succinate PF (Solu-CORTEF) 100 MG injection 100 mg  100 mg Intravenous Q8HRS Alan Rios M.D.   100 mg at 11/03/21 0603       Fluids    Intake/Output Summary (Last 24 hours) at 11/3/2021 1150  Last data filed at 11/3/2021 1000  Gross per 24 hour   Intake 2794.07 ml   Output 4035 ml   Net -1240.93 ml       Laboratory  Recent Labs     10/31/21  2038 11/01/21  0503 11/02/21  0541 11/02/21  2235   [id number]  --   --   --  7.41   [id number]  --   --   --  49.6*   [id number]  --   --   --  86.2   [id number]  --   --   --  96.0   ARTHCO3  --   --   --  31*   [id number]  --   --   --  60.0*   FIO2  --   --   --  100*   ARTBE  --   --   --  5*   ISTATAPH 7.284* 7.270* 7.406  --    ISTATAPCO2 68.5* 67.4* 51.0*  --    ISTATAPO2 81 127* 74  --    ISTATATCO2 35* 33 34*  --    [id number] 94 98 94  --    ISTATARTHCO3 32.5* 30.9* 32.0*  --    ISTATARTBE 2 1 5*  --    ISTATTEMP 37.8 C 37.0 C 97.4 F  --    ISTATFIO2  --   --  50  --    ISTATSPEC Arterial Arterial Arterial  --    ISTATAPHTC 7.272* 7.270* 7.415  --    [id number] 85 127* 71  --          Recent Labs     11/01/21  0310 11/01/21  0755 11/02/21  0530 11/02/21  1030 11/02/21  2200 11/03/21  0200 11/03/21  0600   SODIUM 119*   < > 135   < > 131* 133* 133*   POTASSIUM 5.0   < > 5.3   < > 4.4 4.2 4.1   CHLORIDE 84*   < > 96   < > 95* 95* 95*   CO2 23   < > 30   < > 32 32 32   BUN 16   < > 19   < > 20 19 18   CREATININE 1.17   < > 1.00   < >  0.91 0.85 0.74   MAGNESIUM 1.6  --  2.3  --   --   --  1.8   PHOSPHORUS 4.4  --  3.5  --   --   --  3.1   CALCIUM 6.9*   < > 8.1*   < > 8.2* 8.3* 8.2*    < > = values in this interval not displayed.     Recent Labs     10/31/21  1701 10/31/21  1701 11/01/21  0310 11/01/21  1110 11/01/21  1112 11/02/21  0206 11/02/21  0530 11/02/21  2200 11/03/21  0200 11/03/21  0600   ALTSGPT 16  --  13  --   --   --   --   --   --   --    ASTSGOT 33  --  28  --   --   --   --   --   --   --    ALKPHOSPHAT 107*  --  78  --   --   --   --   --   --   --    TBILIRUBIN 1.3  --  0.7  --   --   --   --   --   --   --    PREALBUMIN  --   --   --  4.4*  --  6.3*  --   --   --   --    GLUCOSE 183*   < > 192*  --    < > 213*   < > 151* 175* 141*    < > = values in this interval not displayed.     Recent Labs     10/31/21  1701 10/31/21  1701 11/01/21  0310 11/02/21  0530 11/03/21  0600   WBC 10.0   < > 10.9* 4.6* 9.4   NEUTSPOLYS 76.20*   < > 86.40* 67.50 73.00*   LYMPHOCYTES 12.00*   < > 5.30* 17.00* 16.00*   MONOCYTES 11.00   < > 7.60 15.10* 7.00   EOSINOPHILS 0.00   < > 0.00 0.00 0.00   BASOPHILS 0.30   < > 0.10 0.00 0.00   ASTSGOT 33  --  28  --   --    ALTSGPT 16  --  13  --   --    ALKPHOSPHAT 107*  --  78  --   --    TBILIRUBIN 1.3  --  0.7  --   --     < > = values in this interval not displayed.     Recent Labs     10/31/21  1701 10/31/21  1701 11/01/21  0310 11/02/21  0530 11/03/21  0600   RBC 7.52*   < > 6.85* 6.93* 7.10*   HEMOGLOBIN 16.7*   < > 15.1 15.4 16.0   HEMATOCRIT 57.8*   < > 53.5* 54.1* 55.4*   PLATELETCT 203   < > 268 246 250   PROTHROMBTM 14.2  --   --   --   --    APTT 38.3*  --   --   --   --    INR 1.19*  --   --   --   --     < > = values in this interval not displayed.     Imaging  X-Ray:  I have personally reviewed the images and compared with prior images. and No film today    Assessment/Plan    * Acute hypoxemic respiratory failure due to COVID-19 (HCC)- (present on admission)  Assessment & Plan  Intubated  10/31/21, 6.0 ETT, challenging intubation  Self extubated 11/2 despite restraints and adequate sedation  Doing well on HFNC  PT/OT/SLP ordered  Cont hydrocortisone 100 mg q 8  s/p tocilimuzab 11/1/2021   Encourage proning 16h/day  Strict droplet, contact and  Eye protection    Hyponatremia- (present on admission)  Assessment & Plan  Due to SIADH  Improved  DC d5w  Daily BMP checks    Encephalopathy acute- (present on admission)  Assessment & Plan  Improved  AOx4  DC precedex    MULU (acute kidney injury) ([facility])  Assessment & Plan  Improving   Avoid nephrotoxins  Clinically euvolemic however hard to assess due to body habitus    Morbid obesity with BMI of 50.0-59.9, adult ([facility])  Assessment & Plan  Comorbidity complicating ventilator synchrony, mechanics  Also associated with severe disease in COVID    Hypertension  Assessment & Plan  Resume home MTP, felodipine with holding parameters    Polycythemia vera ([facility])- (present on admission)  Assessment & Plan  Likely secondary from nocturnal and maybe daytime hypoxemia  No indication for therapeutic phlebotomy   Nocturnal BIPAP     VTE:  Heparin  Ulcer: H2 Antagonist  Lines: Central Line  Ongoing indication addressed and Orr Catheter  Ongoing indication addressed    I have performed a physical exam and reviewed and updated ROS and Plan today (11/3/2021). In review of yesterday's note (11/2/2021), there are no changes except as documented above.     Discussed patient condition and risk of morbidity and/or mortality with RN, RT, Pharmacy and Charge nurse / hot rounds     The patient remains critically ill.  Critical care time = 55 minutes in directly providing and coordinating critical care and extensive data review.  No time overlap and excludes procedures.    This note was generated using voice recognition software which has a chance of producing errors of grammar and content.  I have made every reasonable attempt to find and correct any errors, but it should be expected  that some may not be found prior to finalization of this note.  __________  Deniz Boyce MD  Pulmonary and Critical Care Medicine  [facility]

## 2021-11-04 ENCOUNTER — APPOINTMENT (OUTPATIENT)
Dept: RADIOLOGY | Facility: MEDICAL CENTER | Age: 46
DRG: 871 | End: 2021-11-04
Attending: HOSPITALIST
Payer: COMMERCIAL

## 2021-11-04 PROBLEM — G93.40 ENCEPHALOPATHY ACUTE: Status: RESOLVED | Noted: 2021-10-31 | Resolved: 2021-11-04

## 2021-11-04 PROBLEM — R22.1 SUBMANDIBULAR SWELLING: Status: ACTIVE | Noted: 2021-11-04

## 2021-11-04 PROBLEM — R22.0 SUBMANDIBULAR SWELLING: Status: ACTIVE | Noted: 2021-11-04

## 2021-11-04 PROBLEM — E87.1 HYPONATREMIA: Status: RESOLVED | Noted: 2021-10-31 | Resolved: 2021-11-04

## 2021-11-04 PROBLEM — N17.9 AKI (ACUTE KIDNEY INJURY) (HCC): Status: RESOLVED | Noted: 2021-11-02 | Resolved: 2021-11-04

## 2021-11-04 LAB
ALBUMIN SERPL BCP-MCNC: 2.8 G/DL (ref 3.2–4.9)
ALBUMIN/GLOB SERPL: 0.7 G/DL
ALP SERPL-CCNC: 100 U/L (ref 30–99)
ALT SERPL-CCNC: 20 U/L (ref 2–50)
ANION GAP SERPL CALC-SCNC: 12 MMOL/L (ref 7–16)
AST SERPL-CCNC: 39 U/L (ref 12–45)
BASOPHILS # BLD AUTO: 0.2 % (ref 0–1.8)
BASOPHILS # BLD: 0.03 K/UL (ref 0–0.12)
BILIRUB SERPL-MCNC: 1.1 MG/DL (ref 0.1–1.5)
BUN SERPL-MCNC: 19 MG/DL (ref 8–22)
CA-I SERPL-SCNC: 1.06 MMOL/L (ref 1.1–1.3)
CALCIUM SERPL-MCNC: 8.5 MG/DL (ref 8.4–10.2)
CHLORIDE SERPL-SCNC: 93 MMOL/L (ref 96–112)
CO2 SERPL-SCNC: 30 MMOL/L (ref 20–33)
CREAT SERPL-MCNC: 0.73 MG/DL (ref 0.5–1.4)
EOSINOPHIL # BLD AUTO: 0 K/UL (ref 0–0.51)
EOSINOPHIL NFR BLD: 0 % (ref 0–6.9)
ERYTHROCYTE [DISTWIDTH] IN BLOOD BY AUTOMATED COUNT: 53.1 FL (ref 35.9–50)
GLOBULIN SER CALC-MCNC: 3.9 G/DL (ref 1.9–3.5)
GLUCOSE BLD-MCNC: 122 MG/DL (ref 65–99)
GLUCOSE BLD-MCNC: 140 MG/DL (ref 65–99)
GLUCOSE BLD-MCNC: 156 MG/DL (ref 65–99)
GLUCOSE BLD-MCNC: 170 MG/DL (ref 65–99)
GLUCOSE SERPL-MCNC: 156 MG/DL (ref 65–99)
HCT VFR BLD AUTO: 60.6 % (ref 37–47)
HGB BLD-MCNC: 17 G/DL (ref 12–16)
IMM GRANULOCYTES # BLD AUTO: 0.07 K/UL (ref 0–0.11)
IMM GRANULOCYTES NFR BLD AUTO: 0.4 % (ref 0–0.9)
LYMPHOCYTES # BLD AUTO: 1.2 K/UL (ref 1–4.8)
LYMPHOCYTES NFR BLD: 7.4 % (ref 22–41)
MAGNESIUM SERPL-MCNC: 1.9 MG/DL (ref 1.5–2.5)
MCH RBC QN AUTO: 22 PG (ref 27–33)
MCHC RBC AUTO-ENTMCNC: 28.1 G/DL (ref 33.6–35)
MCV RBC AUTO: 78.4 FL (ref 81.4–97.8)
MONOCYTES # BLD AUTO: 1.19 K/UL (ref 0–0.85)
MONOCYTES NFR BLD AUTO: 7.3 % (ref 0–13.4)
NEUTROPHILS # BLD AUTO: 13.81 K/UL (ref 2–7.15)
NEUTROPHILS NFR BLD: 84.7 % (ref 44–72)
NRBC # BLD AUTO: 0 K/UL
NRBC BLD-RTO: 0 /100 WBC
PHOSPHATE SERPL-MCNC: 3.7 MG/DL (ref 2.5–4.5)
PLATELET # BLD AUTO: 372 K/UL (ref 164–446)
PMV BLD AUTO: 10 FL (ref 9–12.9)
POTASSIUM SERPL-SCNC: 3.7 MMOL/L (ref 3.6–5.5)
PROT SERPL-MCNC: 6.7 G/DL (ref 6–8.2)
RBC # BLD AUTO: 7.73 M/UL (ref 4.2–5.4)
SODIUM SERPL-SCNC: 135 MMOL/L (ref 135–145)
WBC # BLD AUTO: 16.3 K/UL (ref 4.8–10.8)

## 2021-11-04 PROCEDURE — 82330 ASSAY OF CALCIUM: CPT

## 2021-11-04 PROCEDURE — 82962 GLUCOSE BLOOD TEST: CPT

## 2021-11-04 PROCEDURE — 83735 ASSAY OF MAGNESIUM: CPT

## 2021-11-04 PROCEDURE — 94640 AIRWAY INHALATION TREATMENT: CPT

## 2021-11-04 PROCEDURE — 94760 N-INVAS EAR/PLS OXIMETRY 1: CPT

## 2021-11-04 PROCEDURE — 80053 COMPREHEN METABOLIC PANEL: CPT

## 2021-11-04 PROCEDURE — 94660 CPAP INITIATION&MGMT: CPT

## 2021-11-04 PROCEDURE — 700102 HCHG RX REV CODE 250 W/ 637 OVERRIDE(OP): Performed by: INTERNAL MEDICINE

## 2021-11-04 PROCEDURE — 70491 CT SOFT TISSUE NECK W/DYE: CPT

## 2021-11-04 PROCEDURE — 700105 HCHG RX REV CODE 258: Performed by: INTERNAL MEDICINE

## 2021-11-04 PROCEDURE — 84100 ASSAY OF PHOSPHORUS: CPT

## 2021-11-04 PROCEDURE — A9270 NON-COVERED ITEM OR SERVICE: HCPCS | Performed by: INTERNAL MEDICINE

## 2021-11-04 PROCEDURE — 700111 HCHG RX REV CODE 636 W/ 250 OVERRIDE (IP): Performed by: HOSPITALIST

## 2021-11-04 PROCEDURE — 700117 HCHG RX CONTRAST REV CODE 255: Performed by: HOSPITALIST

## 2021-11-04 PROCEDURE — 85025 COMPLETE CBC W/AUTO DIFF WBC: CPT

## 2021-11-04 PROCEDURE — 770022 HCHG ROOM/CARE - ICU (200)

## 2021-11-04 PROCEDURE — 99291 CRITICAL CARE FIRST HOUR: CPT | Performed by: INTERNAL MEDICINE

## 2021-11-04 PROCEDURE — 700111 HCHG RX REV CODE 636 W/ 250 OVERRIDE (IP): Performed by: INTERNAL MEDICINE

## 2021-11-04 PROCEDURE — 87040 BLOOD CULTURE FOR BACTERIA: CPT | Mod: 91

## 2021-11-04 RX ORDER — ACETAMINOPHEN 325 MG/1
650 TABLET ORAL EVERY 6 HOURS PRN
Status: DISCONTINUED | OUTPATIENT
Start: 2021-11-04 | End: 2021-11-07 | Stop reason: HOSPADM

## 2021-11-04 RX ORDER — HYDRALAZINE HYDROCHLORIDE 25 MG/1
25 TABLET, FILM COATED ORAL EVERY 8 HOURS
Status: DISCONTINUED | OUTPATIENT
Start: 2021-11-04 | End: 2021-11-05

## 2021-11-04 RX ORDER — ENALAPRILAT 1.25 MG/ML
2.5 INJECTION INTRAVENOUS EVERY 6 HOURS PRN
Status: DISCONTINUED | OUTPATIENT
Start: 2021-11-04 | End: 2021-11-07 | Stop reason: HOSPADM

## 2021-11-04 RX ORDER — METOPROLOL TARTRATE 50 MG/1
150 TABLET, FILM COATED ORAL TWICE DAILY
Status: DISCONTINUED | OUTPATIENT
Start: 2021-11-04 | End: 2021-11-05

## 2021-11-04 RX ORDER — AMOXICILLIN 250 MG
2 CAPSULE ORAL 2 TIMES DAILY
Status: DISCONTINUED | OUTPATIENT
Start: 2021-11-04 | End: 2021-11-07 | Stop reason: HOSPADM

## 2021-11-04 RX ORDER — MAGNESIUM SULFATE HEPTAHYDRATE 40 MG/ML
2 INJECTION, SOLUTION INTRAVENOUS ONCE
Status: COMPLETED | OUTPATIENT
Start: 2021-11-04 | End: 2021-11-04

## 2021-11-04 RX ORDER — DEXAMETHASONE SODIUM PHOSPHATE 4 MG/ML
4 INJECTION, SOLUTION INTRA-ARTICULAR; INTRALESIONAL; INTRAMUSCULAR; INTRAVENOUS; SOFT TISSUE EVERY 6 HOURS
Status: DISCONTINUED | OUTPATIENT
Start: 2021-11-04 | End: 2021-11-05

## 2021-11-04 RX ORDER — BISACODYL 10 MG
10 SUPPOSITORY, RECTAL RECTAL
Status: DISCONTINUED | OUTPATIENT
Start: 2021-11-04 | End: 2021-11-07 | Stop reason: HOSPADM

## 2021-11-04 RX ORDER — AMLODIPINE BESYLATE 5 MG/1
10 TABLET ORAL
Status: DISCONTINUED | OUTPATIENT
Start: 2021-11-05 | End: 2021-11-07 | Stop reason: HOSPADM

## 2021-11-04 RX ORDER — ONDANSETRON 4 MG/1
4 TABLET, ORALLY DISINTEGRATING ORAL EVERY 6 HOURS PRN
Status: DISCONTINUED | OUTPATIENT
Start: 2021-11-04 | End: 2021-11-07 | Stop reason: HOSPADM

## 2021-11-04 RX ORDER — POLYETHYLENE GLYCOL 3350 17 G/17G
1 POWDER, FOR SOLUTION ORAL
Status: DISCONTINUED | OUTPATIENT
Start: 2021-11-04 | End: 2021-11-07 | Stop reason: HOSPADM

## 2021-11-04 RX ORDER — POTASSIUM CHLORIDE 20 MEQ/1
40 TABLET, EXTENDED RELEASE ORAL ONCE
Status: COMPLETED | OUTPATIENT
Start: 2021-11-04 | End: 2021-11-04

## 2021-11-04 RX ORDER — CLONIDINE HYDROCHLORIDE 0.1 MG/1
0.2 TABLET ORAL ONCE
Status: COMPLETED | OUTPATIENT
Start: 2021-11-04 | End: 2021-11-04

## 2021-11-04 RX ORDER — LISINOPRIL 20 MG/1
20 TABLET ORAL TWICE DAILY
Status: DISCONTINUED | OUTPATIENT
Start: 2021-11-04 | End: 2021-11-07 | Stop reason: HOSPADM

## 2021-11-04 RX ORDER — CALCIUM GLUCONATE 20 MG/ML
1 INJECTION, SOLUTION INTRAVENOUS ONCE
Status: COMPLETED | OUTPATIENT
Start: 2021-11-04 | End: 2021-11-04

## 2021-11-04 RX ORDER — ENALAPRILAT 1.25 MG/ML
1.25 INJECTION INTRAVENOUS ONCE
Status: COMPLETED | OUTPATIENT
Start: 2021-11-04 | End: 2021-11-04

## 2021-11-04 RX ADMIN — HEPARIN SODIUM 5000 UNITS: 5000 INJECTION, SOLUTION INTRAVENOUS; SUBCUTANEOUS at 05:21

## 2021-11-04 RX ADMIN — HEPARIN SODIUM 5000 UNITS: 5000 INJECTION, SOLUTION INTRAVENOUS; SUBCUTANEOUS at 22:23

## 2021-11-04 RX ADMIN — LISINOPRIL 20 MG: 20 TABLET ORAL at 17:23

## 2021-11-04 RX ADMIN — SENNOSIDES AND DOCUSATE SODIUM 2 TABLET: 50; 8.6 TABLET ORAL at 05:20

## 2021-11-04 RX ADMIN — MAGNESIUM SULFATE 2 G: 2 INJECTION INTRAVENOUS at 08:16

## 2021-11-04 RX ADMIN — HYDRALAZINE HYDROCHLORIDE 25 MG: 25 TABLET, FILM COATED ORAL at 22:23

## 2021-11-04 RX ADMIN — POTASSIUM CHLORIDE 40 MEQ: 1500 TABLET, EXTENDED RELEASE ORAL at 08:15

## 2021-11-04 RX ADMIN — AMPICILLIN SODIUM AND SULBACTAM SODIUM 3 G: 2; 1 INJECTION, POWDER, FOR SOLUTION INTRAMUSCULAR; INTRAVENOUS at 22:23

## 2021-11-04 RX ADMIN — CALCIUM GLUCONATE 1 G: 20 INJECTION, SOLUTION INTRAVENOUS at 08:16

## 2021-11-04 RX ADMIN — DEXAMETHASONE SODIUM PHOSPHATE 4 MG: 4 INJECTION, SOLUTION INTRA-ARTICULAR; INTRALESIONAL; INTRAMUSCULAR; INTRAVENOUS; SOFT TISSUE at 17:24

## 2021-11-04 RX ADMIN — AMLODIPINE BESYLATE 10 MG: 5 TABLET ORAL at 05:20

## 2021-11-04 RX ADMIN — HYDRALAZINE HYDROCHLORIDE 10 MG: 20 INJECTION INTRAMUSCULAR; INTRAVENOUS at 12:44

## 2021-11-04 RX ADMIN — METOPROLOL TARTRATE 150 MG: 50 TABLET, FILM COATED ORAL at 17:21

## 2021-11-04 RX ADMIN — DEXAMETHASONE SODIUM PHOSPHATE 4 MG: 4 INJECTION, SOLUTION INTRA-ARTICULAR; INTRALESIONAL; INTRAMUSCULAR; INTRAVENOUS; SOFT TISSUE at 12:37

## 2021-11-04 RX ADMIN — BUDESONIDE AND FORMOTEROL FUMARATE DIHYDRATE 2 PUFF: 160; 4.5 AEROSOL RESPIRATORY (INHALATION) at 19:40

## 2021-11-04 RX ADMIN — ENALAPRILAT 1.25 MG: 1.25 INJECTION INTRAVENOUS at 14:30

## 2021-11-04 RX ADMIN — HYDROCORTISONE SODIUM SUCCINATE 100 MG: 100 INJECTION, POWDER, FOR SOLUTION INTRAMUSCULAR; INTRAVENOUS at 05:21

## 2021-11-04 RX ADMIN — INSULIN HUMAN 3 UNITS: 100 INJECTION, SOLUTION PARENTERAL at 17:53

## 2021-11-04 RX ADMIN — SENNOSIDES AND DOCUSATE SODIUM 2 TABLET: 50; 8.6 TABLET ORAL at 17:23

## 2021-11-04 RX ADMIN — INSULIN HUMAN 3 UNITS: 100 INJECTION, SOLUTION PARENTERAL at 05:30

## 2021-11-04 RX ADMIN — HYDRALAZINE HYDROCHLORIDE 10 MG: 20 INJECTION INTRAMUSCULAR; INTRAVENOUS at 04:14

## 2021-11-04 RX ADMIN — METOPROLOL TARTRATE 100 MG: 50 TABLET, FILM COATED ORAL at 05:20

## 2021-11-04 RX ADMIN — BUDESONIDE AND FORMOTEROL FUMARATE DIHYDRATE 2 PUFF: 160; 4.5 AEROSOL RESPIRATORY (INHALATION) at 12:24

## 2021-11-04 RX ADMIN — IOHEXOL 80 ML: 350 INJECTION, SOLUTION INTRAVENOUS at 09:08

## 2021-11-04 RX ADMIN — CLONIDINE HYDROCHLORIDE 0.2 MG: 0.1 TABLET ORAL at 17:23

## 2021-11-04 RX ADMIN — HEPARIN SODIUM 5000 UNITS: 5000 INJECTION, SOLUTION INTRAVENOUS; SUBCUTANEOUS at 13:06

## 2021-11-04 RX ADMIN — HYDRALAZINE HYDROCHLORIDE 25 MG: 25 TABLET, FILM COATED ORAL at 17:23

## 2021-11-04 ASSESSMENT — ENCOUNTER SYMPTOMS
FEVER: 0
MYALGIAS: 0
SORE THROAT: 0
FOCAL WEAKNESS: 0
SENSORY CHANGE: 0
NAUSEA: 0
VOMITING: 0
ORTHOPNEA: 0
DIZZINESS: 0
LOSS OF CONSCIOUSNESS: 0
COUGH: 0
BACK PAIN: 1
DIARRHEA: 0
CHILLS: 0
EYE DISCHARGE: 0
STRIDOR: 0
SINUS PAIN: 0
SPUTUM PRODUCTION: 0
WHEEZING: 0
EYE PAIN: 0
ABDOMINAL PAIN: 0
WEIGHT LOSS: 0
SHORTNESS OF BREATH: 0
HEADACHES: 0
PSYCHIATRIC NEGATIVE: 1

## 2021-11-04 ASSESSMENT — PAIN DESCRIPTION - PAIN TYPE
TYPE: ACUTE PAIN

## 2021-11-04 ASSESSMENT — PULMONARY FUNCTION TESTS
EPAP_CMH2O: 8

## 2021-11-04 NOTE — PROGRESS NOTES
OVERNIGHT HOSPITALIST:    I was notified by the nursing staff at the end of my shift about abnormal possibly worsening neck swelling.  Came to the room and evaluated patient.  Noticed indurated area/neck mass just below her chin that is the size a little bigger than a baseball.  There is no significant tenderness to palpation but patient does report this is getting worse overnight.  Denies shortness of breath or difficulty swallowing    Patient with history of extreme difficult/traumatic intubation on 10/31, who self extubated on 11/2 but so far doing well on high flow nasal cannula.    Findings are likely secondary to recent difficult intubation but given the size and worsening of pain, I will add a CT of the neck and soft tissues with contrast to further evaluate.

## 2021-11-04 NOTE — PROGRESS NOTES
Assumed pt care. Report received from Day shift RN Neelam. Pt very pleasant, resting in bed. HHNC on, sats mid to high 90's. Pt A&O 4, answers all questions appropriately. POC discussed with pt. Pt's chin noted to be bruised and swollen, hard to the touch. Per pt this has been this way since first being intubated. No prior report received from day shift so this RN monitoring throughout night, noting size. Per pt, no painful or difficulty swallowing.     0450 MD made aware of pt reporting pain on bruised chin.    0500 MD at bedside to assess pt. Stat CT ordered around 0520 am.

## 2021-11-04 NOTE — ASSESSMENT & PLAN NOTE
Suspect 2/2 trauma from difficult intubation on 10/31  Airway patent, no stridor  Breathing comfortably  Passed swallow eval  CT reviewed, have reached out to ENT to review imaging to confirm no further needs  ----  Plan for conservative management, taper decadron to minimize swelling/inflammation  Closely monitor for signs/symptoms of airway compromise  Complete 7 day course of abx  Humidify supplemental O2  Cont modified diet

## 2021-11-04 NOTE — PROGRESS NOTES
Critical Care Progress Note    Date of admission  10/31/2021    Chief Complaint  46 y.o. female admitted 10/31/2021 with hyponatremia and acute hypoxemic respiratory failure in the setting of COVID-19    Hospital Course  46 y.o. female admitted 10/31/2021 with HTN, morbid obesity, secondary polycythemia brought in by [facility] with worsening SOB. Rapid COVID test + yesterday and on 3L oxygen was saturating 72%. Pt was altered.    Labs in ER showed Na of 116, altered and hypoxic, cyanotic and in extreme distress  Extremely difficult intubation but pt finally intubated with 6.0 ET tube  Transferred to the ICU for further management - per Dr Juares admit H&P 10/31    Interval Problem Update  Chart review from the past 24 hours includes imaging, laboratory studies, vital signs and notes available.  Pertinent data for today's visit includes    BIPAP overnight, did well  Developed large submandibular bruising/swelling overnight- NOT in region of BIPAP mask and was on minimal pressure 12/8, 55%. Suspect related to trauma from difficult intubation Marcello night.     Cardiac: off pressors  Pulm: HFNC 60L/100% 7.41/49/86  Neuro: continued on precedex for agitation  Heme: chronic polycythemia stable, leukocytosis resolved  I/O: MULU resolved  ID:  Persistently febrile, Tmax 101.8F. COVID + 10/30. Hydrocortisone 100mg Q8H. S/p tocilizumab 11/1. Pancultured, NGTD. No abx.  GI/endo: NPO  Labs/Imaging: Na 133, dc d5w  Lines: triple lumen IJ all three working again  Mobility: bedrest  Symptoms: disoriented    Review of Systems  Review of Systems   Constitutional: Negative for chills, fever and weight loss.   HENT: Negative for congestion, sinus pain and sore throat.    Eyes: Negative for pain and discharge.   Respiratory: Negative for cough, sputum production, shortness of breath, wheezing and stridor.    Cardiovascular: Negative for chest pain, orthopnea and leg swelling.   Gastrointestinal: Negative for abdominal pain, diarrhea, nausea  and vomiting.   Genitourinary: Negative for dysuria, frequency and urgency.   Musculoskeletal: Positive for back pain. Negative for myalgias.   Skin: Negative for rash.   Neurological: Negative for dizziness, sensory change, focal weakness, loss of consciousness and headaches.   Psychiatric/Behavioral: Negative.    All other systems reviewed and are negative.     Vital Signs for last 24 hours   Temp:  [36.1 °C (97 °F)-37.2 °C (98.9 °F)] 37.1 °C (98.7 °F)  Pulse:  [] 119  Resp:  [10-39] 25  BP: (123-198)/() 142/83  SpO2:  [93 %-97 %] 95 %    Respiratory Information for the last 24 hours       Physical Exam   Physical Exam  Vitals and nursing note reviewed.   Constitutional:       General: She is not in acute distress.     Appearance: She is well-developed. She is obese. She is not ill-appearing or diaphoretic.   Eyes:      General: No scleral icterus.        Right eye: No discharge.         Left eye: No discharge.   Neck:      Thyroid: No thyromegaly.      Vascular: No JVD.      Comments: large submandibular bruising/swelling  Cardiovascular:      Rate and Rhythm: Regular rhythm. Tachycardia present.      Heart sounds: Normal heart sounds. No murmur heard.   No gallop.    Pulmonary:      Effort: No respiratory distress.      Breath sounds: Normal breath sounds. No wheezing or rales.      Comments: Distant bilateral breath sounds  Abdominal:      General: There is distension.      Palpations: Abdomen is soft.      Tenderness: There is no abdominal tenderness. There is no guarding.   Musculoskeletal:         General: No tenderness.      Cervical back: No rigidity.   Lymphadenopathy:      Cervical: No cervical adenopathy.   Skin:     General: Skin is warm.      Capillary Refill: Capillary refill takes less than 2 seconds.      Findings: No erythema or rash.   Neurological:      General: No focal deficit present.      Mental Status: She is alert and oriented to person, place, and time.      Cranial Nerves: No  cranial nerve deficit.      Sensory: No sensory deficit.      Motor: No abnormal muscle tone.   Psychiatric:         Behavior: Behavior normal.       Medications  Current Facility-Administered Medications   Medication Dose Route Frequency Provider Last Rate Last Admin   • magnesium sulfate IVPB premix 2 g  2 g Intravenous Once Deniz Boyce M.D. 25 mL/hr at 11/04/21 0816 2 g at 11/04/21 0816   • [START ON 11/5/2021] amLODIPine (NORVASC) tablet 10 mg  10 mg Oral Q DAY Deniz Boyce M.D.       • senna-docusate (PERICOLACE or SENOKOT S) 8.6-50 MG per tablet 2 Tablet  2 Tablet Oral BID Deniz Boyce M.D.        And   • polyethylene glycol/lytes (MIRALAX) PACKET 1 Packet  1 Packet Oral QDAY PRN Deniz Boyce M.D.        And   • magnesium hydroxide (MILK OF MAGNESIA) suspension 30 mL  30 mL Oral QDAY PRN Deniz Boyce M.D.        And   • bisacodyl (DULCOLAX) suppository 10 mg  10 mg Rectal QDAY PRN Deniz Boyce M.D.       • acetaminophen (Tylenol) tablet 650 mg  650 mg Oral Q6HRS PRN Deniz Boyce M.D.       • ondansetron (ZOFRAN ODT) dispertab 4 mg  4 mg Oral Q6HRS PRN Deniz Boyce M.D.       • metoprolol tartrate (LOPRESSOR) tablet 100 mg  100 mg Oral TWICE DAILY Deniz Boyce M.D.   100 mg at 11/04/21 0520   • budesonide-formoterol (SYMBICORT) 160-4.5 MCG/ACT inhaler 2 Puff  2 Puff Inhalation BID (RT) Deniz Boyce M.D.       • hydrALAZINE (APRESOLINE) injection 10 mg  10 mg Intravenous Q6HRS PRN Essence Foster M.D.   10 mg at 11/04/21 0414   • ondansetron (ZOFRAN) syringe/vial injection 4 mg  4 mg Intravenous Q6HRS PRN Alan Rios M.D.       • heparin injection 5,000 Units  5,000 Units Subcutaneous Q8HRS Alan Rios M.D.   5,000 Units at 11/04/21 0521   • insulin regular (HumuLIN R,NovoLIN R) injection  3-14 Units Subcutaneous Q6HRS Alan Rios M.D.   3 Units at 11/04/21 0530    And   • glucose 4 g chewable tablet 16 g  16 g Oral Q15 MIN  PRN Alan Rios M.D.        And   • dextrose 50% (D50W) injection 50 mL  50 mL Intravenous Q15 MIN PRN Alan Rios M.D.       • Respiratory Therapy Consult   Nebulization Continuous RT Alan Rios M.D.       • MD Alert...ICU Electrolyte Replacement per Pharmacy   Other PHARMACY TO DOSE Alan Rios M.D.       • lidocaine (XYLOCAINE) 1 % injection 2 mL  2 mL Tracheal Tube Q30 MIN PRN Alan Rios M.D.       • hydrocortisone sodium succinate PF (Solu-CORTEF) 100 MG injection 100 mg  100 mg Intravenous Q8HRS Alan Rios M.D.   100 mg at 11/04/21 0521       Fluids    Intake/Output Summary (Last 24 hours) at 11/4/2021 0929  Last data filed at 11/4/2021 0800  Gross per 24 hour   Intake 1078.53 ml   Output 2110 ml   Net -1031.47 ml       Laboratory  Recent Labs     11/02/21  0541 11/02/21  2235   IESMF68G  --  7.41   VMKCCX418S  --  49.6*   AYNHV419H  --  86.2   PSLL6GQS  --  96.0   ARTHCO3  --  31*   I3USBCHZY  --  60.0*   FIO2  --  100*   ARTBE  --  5*   ISTATAPH 7.406  --    ISTATAPCO2 51.0*  --    ISTATAPO2 74  --    ISTATATCO2 34*  --    GHOLAFS2GFY 94  --    ISTATARTHCO3 32.0*  --    ISTATARTBE 5*  --    ISTATTEMP 97.4 F  --    ISTATFIO2 50  --    ISTATSPEC Arterial  --    ISTATAPHTC 7.415  --    QHQXIRGQ8FY 71  --          Recent Labs     11/02/21  0530 11/02/21  1030 11/03/21  0200 11/03/21  0600 11/04/21  0530   SODIUM 135   < > 133* 133* 135   POTASSIUM 5.3   < > 4.2 4.1 3.7   CHLORIDE 96   < > 95* 95* 93*   CO2 30   < > 32 32 30   BUN 19   < > 19 18 19   CREATININE 1.00   < > 0.85 0.74 0.73   MAGNESIUM 2.3  --   --  1.8 1.9   PHOSPHORUS 3.5  --   --  3.1 3.7   CALCIUM 8.1*   < > 8.3* 8.2* 8.5    < > = values in this interval not displayed.     Recent Labs     11/01/21  1110 11/01/21  1112 11/02/21  0206 11/02/21  0530 11/03/21  0200 11/03/21  0600 11/04/21  0530   ALTSGPT  --   --   --   --   --   --  20   ASTSGOT  --   --   --   --   --   --  39    ALKPHOSPHAT  --   --   --   --   --   --  100*   TBILIRUBIN  --   --   --   --   --   --  1.1   PREALBUMIN 4.4*  --  6.3*  --   --   --   --    GLUCOSE  --    < > 213*   < > 175* 141* 156*    < > = values in this interval not displayed.     Recent Labs     11/02/21  0530 11/03/21 0600 11/04/21  0530   WBC 4.6* 9.4 16.3*   NEUTSPOLYS 67.50 73.00* 84.70*   LYMPHOCYTES 17.00* 16.00* 7.40*   MONOCYTES 15.10* 7.00 7.30   EOSINOPHILS 0.00 0.00 0.00   BASOPHILS 0.00 0.00 0.20   ASTSGOT  --   --  39   ALTSGPT  --   --  20   ALKPHOSPHAT  --   --  100*   TBILIRUBIN  --   --  1.1     Recent Labs     11/02/21 0530 11/03/21 0600 11/04/21  0530   RBC 6.93* 7.10* 7.73*   HEMOGLOBIN 15.4 16.0 17.0*   HEMATOCRIT 54.1* 55.4* 60.6*   PLATELETCT 246 250 372     Imaging  CT:    Reviewed, tonsillar swelling suspect 2/2 trauma, unlikely abscess/neoplasm    Assessment/Plan    * Acute hypoxemic respiratory failure due to COVID-19 (HCC)- (present on admission)  Assessment & Plan  Intubated 10/31/21, 6.0 ETT, challenging intubation  Self extubated 11/2 despite restraints and adequate sedation  Doing well on HFNC, wean FiO2 to maintain sats low 90s  PT/OT/SLP ordered  Switch hydrocortisone 100 mg q 8 -> decadron in setting of tonsillar edema and COVID  s/p tocilimuzab 11/1/2021   Encourage proning 16h/day  Strict droplet, contact and  Eye protection    Submandibular swelling  Assessment & Plan  Suspect 2/2 trauma from difficult intubation on 10/31  Airway patent, no stridor  Breathing comfortably  Passed swallow eval  CT reviewed, have reached out to ENT to review imaging to confirm no further needs  ----  Plan for conservative management, decadron 4mg IV q6H to minimize swelling/inflammation  Closely monitor for signs/symptoms of airway compromise  Difficult airway cart/equipment available  Cool mist aerosol  Cont modified diet    Morbid obesity with BMI of 50.0-59.9, adult (HCC)  Assessment & Plan  Comorbidity complicating ventilator  synchrony, mechanics  Also associated with severe disease in COVID    Hypertension  Assessment & Plan  increase home MTP to 150 BID  Cont felodipine with holding parameters    Polycythemia vera (HCC)- (present on admission)  Assessment & Plan  Likely secondary from nocturnal and maybe daytime hypoxemia  No indication for therapeutic phlebotomy   Cont nocturnal BIPAP, tolerating well     VTE:  Heparin  Ulcer: H2 Antagonist  Lines: Central Line  Ongoing indication addressed and Orr Catheter  Ongoing indication addressed    I have performed a physical exam and reviewed and updated ROS and Plan today (11/4/2021). In review of yesterday's note (11/3/2021), there are no changes except as documented above.     Discussed patient condition and risk of morbidity and/or mortality with RN, RT, Pharmacy and Charge nurse / hot rounds     The patient remains critically ill.  Critical care time = 50 minutes in directly providing and coordinating critical care and extensive data review.  No time overlap and excludes procedures.    This note was generated using voice recognition software which has a chance of producing errors of grammar and content.  I have made every reasonable attempt to find and correct any errors, but it should be expected that some may not be found prior to finalization of this note.  __________  Deniz Boyce MD  Pulmonary and Critical Care Medicine  [facility]

## 2021-11-04 NOTE — DOCUMENTATION QUERY
[facility]                                                                       Query Response Note      PATIENT:               DAMIEN VALENCIA  ACCT #:                  [id number]  MRN:                     [id number]  :                      1975  ADMIT DATE:       10/31/2021 4:56 PM  DISCH DATE:          RESPONDING  PROVIDER #:        [id number]           QUERY TEXT:    Unspecified type of Encephalopathy is documented in the H&P and subsequent progress notes.  Please specify type.    NOTE:  If an appropriate response is not listed below, please respond with a new note.    The patient's Clinical Indicators include:  - Findings:  H&P and subsequent progress notes:  encephalopathy, acute.  Due to COVID PNA and hyponatremia which is severe    - Treatments:  slow correction of 8 meq/24 hrs of Na, central line, Q 4 hour checks, intubation, antibiotics, levophed, hydrocortisone, tocilizumab     - Risk factors:  COVID 19, pneumonia, sepsis, septic shock, severe hyponatremia, MULU, polycythemia vera, acute respiratory failure    Thank You,  Tabby Clemente RN  Clinical Documentation   [email or web address]  Connect via [employer]  Options provided:   -- Anoxic encephalopathy   -- Due to medications or drugs   -- Metabolic encephalopathy   -- Septic encephalopathy   -- Toxic encephalopathy   -- Other type of encephalopathy   -- Unable to determine      Query created by: Tabby Clemente on 11/3/2021 4:28 PM    RESPONSE TEXT:    Toxic encephalopathy          Electronically signed by:  EILEEN ROSA III, MD 11/3/2021 4:59 PM

## 2021-11-05 PROBLEM — K11.20 SIALADENITIS: Status: ACTIVE | Noted: 2021-11-04

## 2021-11-05 PROBLEM — I16.0 HYPERTENSIVE URGENCY: Status: ACTIVE | Noted: 2019-06-29

## 2021-11-05 LAB
ALBUMIN SERPL BCP-MCNC: 3 G/DL (ref 3.2–4.9)
ALBUMIN/GLOB SERPL: 0.9 G/DL
ALP SERPL-CCNC: 85 U/L (ref 30–99)
ALT SERPL-CCNC: 18 U/L (ref 2–50)
ANION GAP SERPL CALC-SCNC: 8 MMOL/L (ref 7–16)
ANISOCYTOSIS BLD QL SMEAR: ABNORMAL
AST SERPL-CCNC: 23 U/L (ref 12–45)
BACTERIA BLD CULT: NORMAL
BACTERIA BLD CULT: NORMAL
BACTERIA UR CULT: NORMAL
BASE EXCESS BLDA CALC-SCNC: 9 MMOL/L (ref -4–3)
BASOPHILS # BLD AUTO: 0.2 % (ref 0–1.8)
BASOPHILS # BLD: 0.03 K/UL (ref 0–0.12)
BILIRUB SERPL-MCNC: 0.9 MG/DL (ref 0.1–1.5)
BODY TEMPERATURE: ABNORMAL DEGREES
BUN SERPL-MCNC: 22 MG/DL (ref 8–22)
CA-I SERPL-SCNC: 1.09 MMOL/L (ref 1.1–1.3)
CALCIUM SERPL-MCNC: 8.4 MG/DL (ref 8.4–10.2)
CHLORIDE SERPL-SCNC: 94 MMOL/L (ref 96–112)
CO2 BLDA-SCNC: 38 MMOL/L (ref 20–33)
CO2 SERPL-SCNC: 33 MMOL/L (ref 20–33)
COMMENT 1642: NORMAL
CREAT SERPL-MCNC: 0.59 MG/DL (ref 0.5–1.4)
DELSYS IDSYS: ABNORMAL
EOSINOPHIL # BLD AUTO: 0 K/UL (ref 0–0.51)
EOSINOPHIL NFR BLD: 0 % (ref 0–6.9)
ERYTHROCYTE [DISTWIDTH] IN BLOOD BY AUTOMATED COUNT: 55.3 FL (ref 35.9–50)
GLOBULIN SER CALC-MCNC: 3.3 G/DL (ref 1.9–3.5)
GLUCOSE BLD-MCNC: 120 MG/DL (ref 65–99)
GLUCOSE BLD-MCNC: 141 MG/DL (ref 65–99)
GLUCOSE BLD-MCNC: 151 MG/DL (ref 65–99)
GLUCOSE BLD-MCNC: 160 MG/DL (ref 65–99)
GLUCOSE SERPL-MCNC: 156 MG/DL (ref 65–99)
HCO3 BLDA-SCNC: 36.4 MMOL/L (ref 17–25)
HCT VFR BLD AUTO: 58 % (ref 37–47)
HGB BLD-MCNC: 16.2 G/DL (ref 12–16)
HOROWITZ INDEX BLDA+IHG-RTO: 144 MM[HG]
HYPOCHROMIA BLD QL SMEAR: ABNORMAL
IMM GRANULOCYTES # BLD AUTO: 0.07 K/UL (ref 0–0.11)
IMM GRANULOCYTES NFR BLD AUTO: 0.6 % (ref 0–0.9)
LACTATE BLD-SCNC: 0.8 MMOL/L (ref 0.5–2)
LPM ILPM: 50 LPM
LYMPHOCYTES # BLD AUTO: 0.83 K/UL (ref 1–4.8)
LYMPHOCYTES NFR BLD: 6.5 % (ref 22–41)
MAGNESIUM SERPL-MCNC: 2.3 MG/DL (ref 1.5–2.5)
MCH RBC QN AUTO: 22.3 PG (ref 27–33)
MCHC RBC AUTO-ENTMCNC: 27.9 G/DL (ref 33.6–35)
MCV RBC AUTO: 79.9 FL (ref 81.4–97.8)
MICROCYTES BLD QL SMEAR: ABNORMAL
MONOCYTES # BLD AUTO: 0.73 K/UL (ref 0–0.85)
MONOCYTES NFR BLD AUTO: 5.7 % (ref 0–13.4)
NEUTROPHILS # BLD AUTO: 11.05 K/UL (ref 2–7.15)
NEUTROPHILS NFR BLD: 87 % (ref 44–72)
NRBC # BLD AUTO: 0 K/UL
NRBC BLD-RTO: 0 /100 WBC
O2/TOTAL GAS SETTING VFR VENT: 50 %
PCO2 BLDA: 53.5 MMHG (ref 26–37)
PH BLDA: 7.44 [PH] (ref 7.4–7.5)
PH TEMP ADJ BLDA: 7.46 [PH] (ref 7.4–7.5)
PHOSPHATE SERPL-MCNC: 4.3 MG/DL (ref 2.5–4.5)
PLATELET # BLD AUTO: 311 K/UL (ref 164–446)
PLATELET BLD QL SMEAR: NORMAL
PMV BLD AUTO: 9.5 FL (ref 9–12.9)
PO2 BLDA: 72 MMHG (ref 64–87)
PO2 TEMP ADJ BLDA: 67 MMHG (ref 64–87)
POTASSIUM SERPL-SCNC: 3.8 MMOL/L (ref 3.6–5.5)
PROT SERPL-MCNC: 6.3 G/DL (ref 6–8.2)
RBC # BLD AUTO: 7.26 M/UL (ref 4.2–5.4)
RBC BLD AUTO: PRESENT
SAO2 % BLDA: 95 % (ref 93–99)
SIGNIFICANT IND 70042: NORMAL
SITE SITE: NORMAL
SODIUM SERPL-SCNC: 135 MMOL/L (ref 135–145)
SOURCE SOURCE: NORMAL
SPECIMEN DRAWN FROM PATIENT: ABNORMAL
WBC # BLD AUTO: 12.7 K/UL (ref 4.8–10.8)

## 2021-11-05 PROCEDURE — 85025 COMPLETE CBC W/AUTO DIFF WBC: CPT

## 2021-11-05 PROCEDURE — 700111 HCHG RX REV CODE 636 W/ 250 OVERRIDE (IP): Performed by: INTERNAL MEDICINE

## 2021-11-05 PROCEDURE — 770022 HCHG ROOM/CARE - ICU (200)

## 2021-11-05 PROCEDURE — 700105 HCHG RX REV CODE 258: Performed by: INTERNAL MEDICINE

## 2021-11-05 PROCEDURE — 83605 ASSAY OF LACTIC ACID: CPT

## 2021-11-05 PROCEDURE — 94640 AIRWAY INHALATION TREATMENT: CPT

## 2021-11-05 PROCEDURE — 84100 ASSAY OF PHOSPHORUS: CPT

## 2021-11-05 PROCEDURE — 94760 N-INVAS EAR/PLS OXIMETRY 1: CPT

## 2021-11-05 PROCEDURE — 82330 ASSAY OF CALCIUM: CPT

## 2021-11-05 PROCEDURE — 97162 PT EVAL MOD COMPLEX 30 MIN: CPT

## 2021-11-05 PROCEDURE — 83735 ASSAY OF MAGNESIUM: CPT

## 2021-11-05 PROCEDURE — 97165 OT EVAL LOW COMPLEX 30 MIN: CPT

## 2021-11-05 PROCEDURE — 94660 CPAP INITIATION&MGMT: CPT

## 2021-11-05 PROCEDURE — 99291 CRITICAL CARE FIRST HOUR: CPT | Performed by: INTERNAL MEDICINE

## 2021-11-05 PROCEDURE — 700102 HCHG RX REV CODE 250 W/ 637 OVERRIDE(OP): Performed by: INTERNAL MEDICINE

## 2021-11-05 PROCEDURE — 80053 COMPREHEN METABOLIC PANEL: CPT

## 2021-11-05 PROCEDURE — A9270 NON-COVERED ITEM OR SERVICE: HCPCS | Performed by: INTERNAL MEDICINE

## 2021-11-05 PROCEDURE — 82962 GLUCOSE BLOOD TEST: CPT | Mod: 91

## 2021-11-05 PROCEDURE — 82803 BLOOD GASES ANY COMBINATION: CPT

## 2021-11-05 RX ORDER — CALCIUM GLUCONATE 20 MG/ML
1 INJECTION, SOLUTION INTRAVENOUS ONCE
Status: COMPLETED | OUTPATIENT
Start: 2021-11-05 | End: 2021-11-05

## 2021-11-05 RX ORDER — CLONIDINE HYDROCHLORIDE 0.1 MG/1
0.2 TABLET ORAL EVERY 8 HOURS PRN
Status: DISCONTINUED | OUTPATIENT
Start: 2021-11-05 | End: 2021-11-06

## 2021-11-05 RX ORDER — DEXAMETHASONE SODIUM PHOSPHATE 4 MG/ML
6 INJECTION, SOLUTION INTRA-ARTICULAR; INTRALESIONAL; INTRAMUSCULAR; INTRAVENOUS; SOFT TISSUE EVERY 12 HOURS
Status: DISCONTINUED | OUTPATIENT
Start: 2021-11-05 | End: 2021-11-06

## 2021-11-05 RX ORDER — HYDRALAZINE HYDROCHLORIDE 25 MG/1
50 TABLET, FILM COATED ORAL EVERY 8 HOURS
Status: DISCONTINUED | OUTPATIENT
Start: 2021-11-05 | End: 2021-11-07 | Stop reason: HOSPADM

## 2021-11-05 RX ORDER — POTASSIUM CHLORIDE 20 MEQ/1
40 TABLET, EXTENDED RELEASE ORAL ONCE
Status: COMPLETED | OUTPATIENT
Start: 2021-11-05 | End: 2021-11-05

## 2021-11-05 RX ORDER — METOPROLOL TARTRATE 50 MG/1
200 TABLET, FILM COATED ORAL TWICE DAILY
Status: DISCONTINUED | OUTPATIENT
Start: 2021-11-05 | End: 2021-11-07 | Stop reason: HOSPADM

## 2021-11-05 RX ORDER — HYDRALAZINE HYDROCHLORIDE 20 MG/ML
20 INJECTION INTRAMUSCULAR; INTRAVENOUS EVERY 6 HOURS PRN
Status: DISCONTINUED | OUTPATIENT
Start: 2021-11-05 | End: 2021-11-07 | Stop reason: HOSPADM

## 2021-11-05 RX ADMIN — HYDRALAZINE HYDROCHLORIDE 25 MG: 25 TABLET, FILM COATED ORAL at 05:58

## 2021-11-05 RX ADMIN — DEXAMETHASONE SODIUM PHOSPHATE 6 MG: 4 INJECTION, SOLUTION INTRA-ARTICULAR; INTRALESIONAL; INTRAMUSCULAR; INTRAVENOUS; SOFT TISSUE at 17:31

## 2021-11-05 RX ADMIN — LISINOPRIL 20 MG: 20 TABLET ORAL at 05:58

## 2021-11-05 RX ADMIN — BUDESONIDE AND FORMOTEROL FUMARATE DIHYDRATE 2 PUFF: 160; 4.5 AEROSOL RESPIRATORY (INHALATION) at 19:46

## 2021-11-05 RX ADMIN — CALCIUM GLUCONATE 1 G: 20 INJECTION, SOLUTION INTRAVENOUS at 07:39

## 2021-11-05 RX ADMIN — AMPICILLIN SODIUM AND SULBACTAM SODIUM 3 G: 2; 1 INJECTION, POWDER, FOR SOLUTION INTRAMUSCULAR; INTRAVENOUS at 11:30

## 2021-11-05 RX ADMIN — CLONIDINE HYDROCHLORIDE 0.2 MG: 0.1 TABLET ORAL at 11:30

## 2021-11-05 RX ADMIN — METOPROLOL TARTRATE 200 MG: 50 TABLET, FILM COATED ORAL at 17:31

## 2021-11-05 RX ADMIN — HEPARIN SODIUM 5000 UNITS: 5000 INJECTION, SOLUTION INTRAVENOUS; SUBCUTANEOUS at 14:19

## 2021-11-05 RX ADMIN — DEXAMETHASONE SODIUM PHOSPHATE 4 MG: 4 INJECTION, SOLUTION INTRA-ARTICULAR; INTRALESIONAL; INTRAMUSCULAR; INTRAVENOUS; SOFT TISSUE at 00:10

## 2021-11-05 RX ADMIN — HYDRALAZINE HYDROCHLORIDE 50 MG: 25 TABLET ORAL at 14:18

## 2021-11-05 RX ADMIN — LISINOPRIL 20 MG: 20 TABLET ORAL at 17:31

## 2021-11-05 RX ADMIN — DEXAMETHASONE SODIUM PHOSPHATE 4 MG: 4 INJECTION, SOLUTION INTRA-ARTICULAR; INTRALESIONAL; INTRAMUSCULAR; INTRAVENOUS; SOFT TISSUE at 11:30

## 2021-11-05 RX ADMIN — INSULIN HUMAN 3 UNITS: 100 INJECTION, SOLUTION PARENTERAL at 11:34

## 2021-11-05 RX ADMIN — AMPICILLIN SODIUM AND SULBACTAM SODIUM 3 G: 2; 1 INJECTION, POWDER, FOR SOLUTION INTRAMUSCULAR; INTRAVENOUS at 17:30

## 2021-11-05 RX ADMIN — INSULIN HUMAN 3 UNITS: 100 INJECTION, SOLUTION PARENTERAL at 00:11

## 2021-11-05 RX ADMIN — AMPICILLIN SODIUM AND SULBACTAM SODIUM 3 G: 2; 1 INJECTION, POWDER, FOR SOLUTION INTRAMUSCULAR; INTRAVENOUS at 23:43

## 2021-11-05 RX ADMIN — HYDRALAZINE HYDROCHLORIDE 50 MG: 25 TABLET ORAL at 21:22

## 2021-11-05 RX ADMIN — HEPARIN SODIUM 5000 UNITS: 5000 INJECTION, SOLUTION INTRAVENOUS; SUBCUTANEOUS at 21:21

## 2021-11-05 RX ADMIN — METOPROLOL TARTRATE 150 MG: 50 TABLET, FILM COATED ORAL at 05:58

## 2021-11-05 RX ADMIN — HEPARIN SODIUM 5000 UNITS: 5000 INJECTION, SOLUTION INTRAVENOUS; SUBCUTANEOUS at 05:58

## 2021-11-05 RX ADMIN — DEXAMETHASONE SODIUM PHOSPHATE 4 MG: 4 INJECTION, SOLUTION INTRA-ARTICULAR; INTRALESIONAL; INTRAMUSCULAR; INTRAVENOUS; SOFT TISSUE at 05:58

## 2021-11-05 RX ADMIN — AMLODIPINE BESYLATE 10 MG: 5 TABLET ORAL at 05:58

## 2021-11-05 RX ADMIN — BUDESONIDE AND FORMOTEROL FUMARATE DIHYDRATE 2 PUFF: 160; 4.5 AEROSOL RESPIRATORY (INHALATION) at 09:18

## 2021-11-05 RX ADMIN — AMPICILLIN SODIUM AND SULBACTAM SODIUM 3 G: 2; 1 INJECTION, POWDER, FOR SOLUTION INTRAMUSCULAR; INTRAVENOUS at 05:57

## 2021-11-05 RX ADMIN — POTASSIUM CHLORIDE 40 MEQ: 1500 TABLET, EXTENDED RELEASE ORAL at 07:39

## 2021-11-05 ASSESSMENT — ENCOUNTER SYMPTOMS
SENSORY CHANGE: 0
SORE THROAT: 1
EYE PAIN: 0
EYE DISCHARGE: 0
BACK PAIN: 1
LOSS OF CONSCIOUSNESS: 0
WHEEZING: 0
SINUS PAIN: 0
DIARRHEA: 0
DIZZINESS: 0
MYALGIAS: 0
COUGH: 0
STRIDOR: 0
SHORTNESS OF BREATH: 0
SPUTUM PRODUCTION: 0
VOMITING: 0
ORTHOPNEA: 0
WEIGHT LOSS: 0
HEADACHES: 0
ABDOMINAL PAIN: 0
PSYCHIATRIC NEGATIVE: 1
FOCAL WEAKNESS: 0
CHILLS: 0
FEVER: 0
NAUSEA: 0

## 2021-11-05 ASSESSMENT — COGNITIVE AND FUNCTIONAL STATUS - GENERAL
DAILY ACTIVITIY SCORE: 21
TOILETING: A LITTLE
HELP NEEDED FOR BATHING: A LITTLE
DRESSING REGULAR LOWER BODY CLOTHING: A LITTLE
SUGGESTED CMS G CODE MODIFIER DAILY ACTIVITY: CJ

## 2021-11-05 ASSESSMENT — PAIN DESCRIPTION - PAIN TYPE
TYPE: ACUTE PAIN

## 2021-11-05 ASSESSMENT — PULMONARY FUNCTION TESTS
EPAP_CMH2O: 8
EPAP_CMH2O: 8

## 2021-11-05 ASSESSMENT — GAIT ASSESSMENTS
DISTANCE (FEET): 20
ASSISTIVE DEVICE: FRONT WHEEL WALKER
DEVIATION: STEP TO
GAIT LEVEL OF ASSIST: SUPERVISED

## 2021-11-05 ASSESSMENT — ACTIVITIES OF DAILY LIVING (ADL): TOILETING: INDEPENDENT

## 2021-11-05 NOTE — PROGRESS NOTES
Pulmonary Critical Care Progress Note      Reviewed imaging this evening with ENT  ENT agrees likely related to traumatic intubation  Also suspicious for submandibular sialadenitis as well.  Recommending empiric abx, warm compress  Blood cultures and abx started (unasyn).    __________  Deniz Boyce MD  Pulmonary and Critical Care Medicine  [facility]

## 2021-11-05 NOTE — CARE PLAN
The patient is Watcher - Medium risk of patient condition declining or worsening    Shift Goals  Clinical Goals: maintain O2 sats  Patient Goals: sleep  Family Goals: support patient     Progress made toward(s) clinical / shift goals:    Problem: Hemodynamics  Goal: Patient's hemodynamics, fluid balance and neurologic status will be stable or improve  Outcome: Not Progressing     Problem: Knowledge Deficit - Standard  Goal: Patient and family/care givers will demonstrate understanding of plan of care, disease process/condition, diagnostic tests and medications  Outcome: Progressing     Problem: Pain - Standard  Goal: Alleviation of pain or a reduction in pain to the patient’s comfort goal  Outcome: Progressing     Problem: Respiratory  Goal: Patient will achieve/maintain optimum respiratory ventilation and gas exchange  Outcome: Progressing       Patient is not progressing towards the following goals:      Problem: Hemodynamics  Goal: Patient's hemodynamics, fluid balance and neurologic status will be stable or improve  Outcome: Not Progressing

## 2021-11-05 NOTE — DISCHARGE PLANNING
Anticipated Discharge Disposition: TBD    Action: LSSW received request to see if patient can have BIPAP delivered to hospital instead of home.    LSW attempted to contact [facility] and could not get through.    LSW spoke to patient's  who reports clinic is closed on Friday's and the company used to be called [employer]. Spouse also reports that patient is on service with them currently for O2. LSW to f/u with [employer] as they bought out [employer].    Barriers to Discharge: TBD    Plan: LSW to f/u with [employer]    1430: LSW spoke to Amber @ [employer]. Amber confirmed that patient on service with [employer]. Amber reports that there is no order for bipap in there system. LSW to f/u with [facility] clinic on Monday when they're open.

## 2021-11-05 NOTE — PROGRESS NOTES
Pt attempting to remove Bipap and get out of bed regardless of instruction to call RN for any needs. MD notified of increasing confusion. STAT ABG ordered. Safety precautions in place. Hourly rounding in place.

## 2021-11-05 NOTE — CARE PLAN
Problem: Nutritional:  Goal: Achieve adequate nutritional intake  Description: Patient will consume 50% of meals  Outcome: Progressing    PO intake is variable from 0-100% x 4 meals. Average is 40$ of meals. Discussed with RN, reports pt dislikes puree diet and is focusing more on liquids. We are in agreement pt would benefit from Boost Plus, will add BID to promote PO intake.    Encourage PO intake as able.

## 2021-11-05 NOTE — THERAPY
Occupational Therapy   Initial Evaluation     Patient Name: Adri Maldonado  Age:  46 y.o., Sex:  female  Medical Record #: [id number]  Today's Date: 11/5/2021     Precautions: (P) Fall Risk, Swallow Precautions ( See Comments)    Assessment  Patient is 46 y.o. female admitted with hyponatremia and acute respiratory failure 2/2 COVID+. Pt demonstrated min A for bed mob, min A for dressing, 5x STS at EOB supervision, and min A for line management for functional mobility. Pt primarily limited by decreased activity tolerance, O2 needs, and generalized weakness. I anticipate pt can DC home, pending progress with therapy. Recommend continued OOB activity with nursing (I.e. up in chair for meals, to C for toileting, etc.). Will follow while in house for skilled OT.     Plan    Recommend Occupational Therapy 4 times per week until therapy goals are met for the following treatments:  [employer] Equipment, Cognitive Skill Development, Neuro Re-Education / Balance, Self Care/Activities of Daily Living, Therapeutic Activities and Therapeutic Exercises.    DC Equipment Recommendations: (P) Unable to determine at this time  Discharge Recommendations: (P) Anticipate that the patient will have no further occupational therapy needs after discharge from the hospital      11/05/21 1027   Initial Contact Note    Initial Contact Note Order Received and Verified, Occupational Therapy Evaluation in Progress with Full Report to Follow.   Prior Living Situation   Prior Services Home-Independent   Housing / Facility 2 Story House   Bathroom Set up Bathtub / Shower Combination   Equipment Owned None   Lives with - Patient's Self Care Capacity Spouse;Child Less than 18 Years of Age   Comments Spouse works during the day and pt has 5 children ages 6-16 y.o.   Prior Level of ADL Function   Self Feeding Independent   Grooming / Hygiene Independent   Bathing Independent   Dressing Independent   Toileting Independent   Prior Level of IADL Function    Medication Management Independent   Laundry Independent   Kitchen Mobility Independent   Finances Independent   Home Management Independent   Shopping Independent   Prior Level Of Mobility Independent Without Device in Community;Independent Without Device in Home   Driving / Transportation Driving Independent   Occupation (Pre-Hospital Vocational) Homemaker   History of Falls   History of Falls No   Precautions   Precautions Fall Risk;Swallow Precautions ( See Comments)   Cognition    Comments pleasant and agreeable. Appeared slightly confused but able to answer all questions and follow commands appropriately   Active ROM Upper Body   Active ROM Upper Body  WDL   Strength Upper Body   Upper Body Strength  WDL   Balance Assessment   Sitting Balance (Static) Fair +   Sitting Balance (Dynamic) Fair +   Standing Balance (Static) Fair   Standing Balance (Dynamic) Fair   Weight Shift Sitting Fair   Weight Shift Standing Fair   Comments without AD   Bed Mobility    Supine to Sit Minimal Assist   Sit to Supine Minimal Assist   ADL Assessment   Grooming Supervision;Seated   Upper Body Dressing Minimal Assist   Lower Body Dressing Minimal Assist   Toileting Minimal Assist   How much help from another person does the patient currently need...   Putting on and taking off regular lower body clothing? 3   Bathing (including washing, rinsing, and drying)? 3   Toileting, which includes using a toilet, bedpan, or urinal? 3   Putting on and taking off regular upper body clothing? 4   Taking care of personal grooming such as brushing teeth? 4   Eating meals? 4   6 Clicks Daily Activity Score 21   Functional Mobility   Sit to Stand Supervised   Bed, Chair, Wheelchair Transfer Supervised   Activity Tolerance   Sitting Edge of Bed 8 min   Standing 6 min   Comments limited by O2 needs   Patient / Family Goals   Patient / Family Goal #1 to get out of bed more   Short Term Goals   Short Term Goal # 1 Pt will joey VALVERDE dressing supervison    Short Term Goal # 2 pt will complete toileting supervision   Short Term Goal # 3 Pt will tolerate standing G/H for 10 min supervsion   Education Group   Role of Occupational Therapist Patient Response Patient;Acceptance;Explanation   Problem List   Problem List Decreased Active Daily Living Skills;Decreased Homemaking Skills;Decreased Functional Mobility;Decreased Activity Tolerance   Anticipated Discharge Equipment and Recommendations   DC Equipment Recommendations Unable to determine at this time   Discharge Recommendations Anticipate that the patient will have no further occupational therapy needs after discharge from the hospital   Interdisciplinary Plan of Care Collaboration   IDT Collaboration with  Nursing;Physical Therapist   Patient Position at End of Therapy In Bed;Call Light within Reach;Tray Table within Reach;Phone within Reach   Collaboration Comments OT findings and recs   Session Information   Date / Session Number  11/5 1 (1/4, 11/11)   Priority 3

## 2021-11-05 NOTE — PROGRESS NOTES
Critical Care Progress Note    Date of admission  10/31/2021    Chief Complaint  46 y.o. female admitted 10/31/2021 with hyponatremia and acute hypoxemic respiratory failure in the setting of COVID-19    Hospital Course  46 y.o. female admitted 10/31/2021 with HTN, morbid obesity, secondary polycythemia brought in by [facility] with worsening SOB. Rapid COVID test + yesterday and on 3L oxygen was saturating 72%. Pt was altered.    Labs in ER showed Na of 116, altered and hypoxic, cyanotic and in extreme distress  Extremely difficult intubation but pt finally intubated with 6.0 ET tube  Transferred to the ICU for further management - per Dr Juares admit H&P 10/31    Interval Problem Update  Chart review from the past 24 hours includes imaging, laboratory studies, vital signs and notes available.  Pertinent data for today's visit includes    BIPAP overnight, did well  Started on unasyn and heat packs overnight for suspected R submandibular sialadenitis    Cardiac: markedly hypertensive despite multiple antihypertensives. Resumed home lisinopril and MTP, started scheduled hydralazine, norvasc, increased home MTP dose to 200 BID. Added clonidine PRN.  Pulm: aerosol mask 50%, BIPAP 12/8 50% overnight, saturating low 90s  Neuro: off all sedatives, AOx 4, no focal deficits  Heme: chronic polycythemia stable, leukocytosis stable  I/O: MULU resolved, -3.2L  ID:  WBC improving, defervesed, started unasyn 11/4 for suspected R submandibular sialadenitis. COVID + 10/30. Switched from hydrocortisone to decadron. S/p tocilizumab 11/1. Pancultured, NGTD.   GI/endo: modified diet by SLP tolerating well  Labs/Imaging: Na normalized  Lines: triple lumen IJ, browne  Mobility: bedrest  Symptoms: AOx4, neck swelling stable    Review of Systems  Review of Systems   Constitutional: Negative for chills, fever and weight loss.   HENT: Positive for sore throat. Negative for congestion and sinus pain.    Eyes: Negative for pain and discharge.    Respiratory: Negative for cough, sputum production, shortness of breath, wheezing and stridor.    Cardiovascular: Negative for chest pain, orthopnea and leg swelling.   Gastrointestinal: Negative for abdominal pain, diarrhea, nausea and vomiting.   Genitourinary: Negative for dysuria, frequency and urgency.   Musculoskeletal: Positive for back pain. Negative for myalgias.   Skin: Negative for rash.   Neurological: Negative for dizziness, sensory change, focal weakness, loss of consciousness and headaches.   Psychiatric/Behavioral: Negative.    All other systems reviewed and are negative.     Vital Signs for last 24 hours   Temp:  [36.1 °C (96.9 °F)-37.8 °C (100 °F)] 37.6 °C (99.7 °F)  Pulse:  [] 113  Resp:  [11-64] 33  BP: (154-198)/() 180/90  SpO2:  [88 %-95 %] 89 %    Physical Exam   Physical Exam  Vitals and nursing note reviewed.   Constitutional:       General: She is not in acute distress.     Appearance: She is well-developed. She is obese. She is not ill-appearing or diaphoretic.   Eyes:      General: No scleral icterus.        Right eye: No discharge.         Left eye: No discharge.   Neck:      Thyroid: No thyromegaly.      Vascular: No JVD.      Comments: large submandibular bruising/swelling  Cardiovascular:      Rate and Rhythm: Regular rhythm. Tachycardia present.      Heart sounds: Normal heart sounds. No murmur heard.   No gallop.    Pulmonary:      Effort: No respiratory distress.      Breath sounds: Normal breath sounds. No wheezing or rales.      Comments: Distant bilateral breath sounds  Abdominal:      General: There is distension.      Palpations: Abdomen is soft.      Tenderness: There is no abdominal tenderness. There is no guarding.   Musculoskeletal:         General: No tenderness.      Cervical back: No rigidity.   Lymphadenopathy:      Cervical: No cervical adenopathy.   Skin:     General: Skin is warm.      Capillary Refill: Capillary refill takes less than 2 seconds.       Findings: No erythema or rash.   Neurological:      General: No focal deficit present.      Mental Status: She is alert and oriented to person, place, and time.      Cranial Nerves: No cranial nerve deficit.      Sensory: No sensory deficit.      Motor: No abnormal muscle tone.   Psychiatric:         Behavior: Behavior normal.       Medications  Current Facility-Administered Medications   Medication Dose Route Frequency Provider Last Rate Last Admin   • hydrALAZINE (APRESOLINE) tablet 50 mg  50 mg Oral Q8HRS Deniz Boyce M.D.       • cloNIDine (CATAPRES) tablet 0.2 mg  0.2 mg Oral Q8HRS PRN Deniz Boyce M.D.   0.2 mg at 11/05/21 1130   • hydrALAZINE (APRESOLINE) injection 20 mg  20 mg Intravenous Q6HRS PRN Deniz Boyce M.D.       • metoprolol tartrate (LOPRESSOR) tablet 200 mg  200 mg Oral TWICE DAILY Deniz Boyce M.D.       • amLODIPine (NORVASC) tablet 10 mg  10 mg Oral Q DAY Deniz Boyce M.D.   10 mg at 11/05/21 0558   • senna-docusate (PERICOLACE or SENOKOT S) 8.6-50 MG per tablet 2 Tablet  2 Tablet Oral BID Deniz Byoce M.D.   2 Tablet at 11/04/21 1723    And   • polyethylene glycol/lytes (MIRALAX) PACKET 1 Packet  1 Packet Oral QDAY PRN Deniz Boyce M.D.        And   • magnesium hydroxide (MILK OF MAGNESIA) suspension 30 mL  30 mL Oral QDAY PRN Deniz Boyce M.D.        And   • bisacodyl (DULCOLAX) suppository 10 mg  10 mg Rectal QDAY PRN Deniz Boyce M.D.       • acetaminophen (Tylenol) tablet 650 mg  650 mg Oral Q6HRS PRN Deniz Boyce M.D.       • ondansetron (ZOFRAN ODT) dispertab 4 mg  4 mg Oral Q6HRS PRN Deniz Boyce M.D.       • dexamethasone (DECADRON) injection 4 mg  4 mg Intravenous Q6HRS Deniz Boyce M.D.   4 mg at 11/05/21 1130   • lisinopril (PRINIVIL) tablet 20 mg  20 mg Oral TWICE DAILY Deniz Boyce M.D.   20 mg at 11/05/21 0558   • enalaprilat (Vasotec) injection 2.5 mg 2 mL  2.5 mg Intravenous Q6HRS PRN Deniz Boyce M.D.        • ampicillin/sulbactam (UNASYN) 3 g in  mL IVPB  3 g Intravenous Q6HRS Deniz Boyce M.D. 200 mL/hr at 11/05/21 1130 3 g at 11/05/21 1130   • budesonide-formoterol (SYMBICORT) 160-4.5 MCG/ACT inhaler 2 Puff  2 Puff Inhalation BID (RT) Deniz Boyce M.D.   2 Puff at 11/05/21 0918   • ondansetron (ZOFRAN) syringe/vial injection 4 mg  4 mg Intravenous Q6HRS PRN Alan Rios M.D.       • heparin injection 5,000 Units  5,000 Units Subcutaneous Q8HRS Alan Rios M.D.   5,000 Units at 11/05/21 0558   • insulin regular (HumuLIN R,NovoLIN R) injection  3-14 Units Subcutaneous Q6HRS Alan Rios M.D.   3 Units at 11/05/21 1134    And   • glucose 4 g chewable tablet 16 g  16 g Oral Q15 MIN PRN Alan Rios M.D.        And   • dextrose 50% (D50W) injection 50 mL  50 mL Intravenous Q15 MIN PRN Alan Rios M.D.       • Respiratory Therapy Consult   Nebulization Continuous RT Alan Rios M.D.       • MD Alert...ICU Electrolyte Replacement per Pharmacy   Other PHARMACY TO DOSE Alan Rios M.D.       • lidocaine (XYLOCAINE) 1 % injection 2 mL  2 mL Tracheal Tube Q30 MIN PRN Alan Rios M.D.           Fluids    Intake/Output Summary (Last 24 hours) at 11/5/2021 1157  Last data filed at 11/5/2021 1000  Gross per 24 hour   Intake 557.5 ml   Output 1245 ml   Net -687.5 ml       Laboratory  Recent Labs     11/02/21  2235 11/05/21  0310   LOYNR55V 7.41  --    PVXHBL281H 49.6*  --    EUSPG907E 86.2  --    IICE9RPH 96.0  --    ARTHCO3 31*  --    Z2KSTFXCB 60.0*  --    FIO2 100*  --    ARTBE 5*  --    ISTATAPH  --  7.441   ISTATAPCO2  --  53.5*   ISTATAPO2  --  72   ISTATATCO2  --  38*   ILKTLSG2WHT  --  95   ISTATARTHCO3  --  36.4*   ISTATARTBE  --  9*   ISTATTEMP  --  96.4 F   ISTATFIO2  --  50   ISTATSPEC  --  Arterial   ISTATAPHTC  --  7.459   NUJBBCBD8VP  --  67         Recent Labs     11/03/21  0600 11/04/21  0530 11/05/21  0401   SODIUM 133*  135 135   POTASSIUM 4.1 3.7 3.8   CHLORIDE 95* 93* 94*   CO2 32 30 33   BUN 18 19 22   CREATININE 0.74 0.73 0.59   MAGNESIUM 1.8 1.9 2.3   PHOSPHORUS 3.1 3.7 4.3   CALCIUM 8.2* 8.5 8.4     Recent Labs     11/03/21  0600 11/04/21  0530 11/05/21  0401   ALTSGPT  --  20 18   ASTSGOT  --  39 23   ALKPHOSPHAT  --  100* 85   TBILIRUBIN  --  1.1 0.9   GLUCOSE 141* 156* 156*     Recent Labs     11/03/21  0600 11/04/21  0530 11/05/21  0401   WBC 9.4 16.3* 12.7*   NEUTSPOLYS 73.00* 84.70* 87.00*   LYMPHOCYTES 16.00* 7.40* 6.50*   MONOCYTES 7.00 7.30 5.70   EOSINOPHILS 0.00 0.00 0.00   BASOPHILS 0.00 0.20 0.20   ASTSGOT  --  39 23   ALTSGPT  --  20 18   ALKPHOSPHAT  --  100* 85   TBILIRUBIN  --  1.1 0.9     Recent Labs     11/03/21  0600 11/04/21  0530 11/05/21  0401   RBC 7.10* 7.73* 7.26*   HEMOGLOBIN 16.0 17.0* 16.2*   HEMATOCRIT 55.4* 60.6* 58.0*   PLATELETCT 250 372 311     Imaging  CT:    Reviewed, tonsillar swelling suspect 2/2 trauma, unlikely abscess/neoplasm    Assessment/Plan    * Acute hypoxemic respiratory failure due to COVID-19 (HCC)- (present on admission)  Assessment & Plan  Intubated 10/31/21, 6.0 ETT, challenging intubation  Self extubated 11/2 despite restraints and adequate sedation  Doing well on HFNC, wean FiO2 to maintain sats low 90s  Cont PT/OT/SLP  taper decadron   s/p tocilimuzab 11/1/2021   Encourage proning 16h/day  Strict droplet, contact and  Eye protection    Sialadenitis  Assessment & Plan  Suspect 2/2 trauma from difficult intubation on 10/31  Airway patent, no stridor  Breathing comfortably  Passed swallow eval  CT reviewed, have reached out to ENT to review imaging to confirm no further needs  ----  Plan for conservative management, decadron 4mg IV q6H to minimize swelling/inflammation  Closely monitor for signs/symptoms of airway compromise  Difficult airway cart/equipment available  Cool mist aerosol  Cont modified diet    Polycythemia vera (HCC)- (present on admission)  Assessment &  Plan  Likely secondary from nocturnal and maybe daytime hypoxemia  No indication for therapeutic phlebotomy   Cont nocturnal BIPAP, tolerating well. Pt has BIPAP approved through [facility]- PCP Nataly Vaughn. Will coordinate with SW/case management to see if we can get device here while she is hospitalized to get set up/titration etc prior to going home.    Morbid obesity with BMI of 50.0-59.9, adult (HCC)  Assessment & Plan  Comorbidity complicating ventilator synchrony, mechanics  Also associated with severe disease in COVID    Hypertensive urgency  Assessment & Plan  increase home MTP to 200 BID  Cont amlodipine 10, lisinopril 20  Increase hydralazine 50mg TID  Add clonidine, hydralazine, enaliprit PRN SBP > 180     VTE:  Heparin  Ulcer: H2 Antagonist  Lines: Central Line  Ongoing indication addressed and Orr Catheter  Ongoing indication addressed    I have performed a physical exam and reviewed and updated ROS and Plan today (11/5/2021). In review of yesterday's note (11/4/2021), there are no changes except as documented above.     Discussed patient condition and risk of morbidity and/or mortality with Family, RN, RT, Pharmacy and Charge nurse / hot rounds     The patient remains critically ill.  Critical care time = 55 minutes in directly providing and coordinating critical care and extensive data review.  No time overlap and excludes procedures.    This note was generated using voice recognition software which has a chance of producing errors of grammar and content.  I have made every reasonable attempt to find and correct any errors, but it should be expected that some may not be found prior to finalization of this note.  __________  Deniz Boyce MD  Pulmonary and Critical Care Medicine  [facility]

## 2021-11-05 NOTE — THERAPY
Physical Therapy   Initial Evaluation     Patient Name: Adri Maldonado  Age:  46 y.o., Sex:  female  Medical Record #: [id number]  Today's Date: 11/5/2021     Precautions  Precautions: Fall Risk;Swallow Precautions ( See Comments)    Assessment  Patient is 46 y.o. female with a diagnosis of covid,obesity Pt lives at home with  and is usually mod active.Acute PT needed to improve transfers,ambulation and stairs      Plan    Recommend Physical Therapy 5 times per week until therapy goals are met for the following treatments:  Gait Training, Neuro Re-Education / Balance, Stair Training, Therapeutic Activities and Therapeutic Exercises      11/05/21 1037   Total Time Spent   Total Time Spent (Mins) 45   Charge Group   PT Evaluation PT Evaluation Mod   Initial Contact Note    Initial Contact Note Order Received and Verified, Physical Therapy Evaluation in Progress with Full Report to Follow.   Vitals   Pulse 95   Respiration 16   Pulse Oximetry 92 %   O2 (LPM) 12   O2 Delivery Device Aerosol Mask   Pain 0 - 10 Group   Therapist Pain Assessment 0   Prior Living Situation   Prior Services Home-Independent   Housing / Facility 2 Story House   Steps Into Home 8   Steps In Home 7   Equipment Owned None   Lives with - Patient's Self Care Capacity Spouse   Prior Level of Functional Mobility   Bed Mobility Independent   Transfer Status Independent   Ambulation Independent   Distance Ambulation (Feet)   (community amb)   Assistive Devices Used None   Stairs Independent   Cognition    Cognition / Consciousness WDL   Level of Consciousness Alert   Passive ROM Lower Body   Passive ROM Lower Body WDL   Active ROM Lower Body    Active ROM Lower Body  WDL   Strength Lower Body   Lower Body Strength  X   Comments general weakness   Coordination Lower Body    Coordination Lower Body  WDL   Balance Assessment   Sitting Balance (Static) Fair +   Sitting Balance (Dynamic) Fair +   Standing Balance (Static) Fair   Standing Balance  (Dynamic) Fair   Weight Shift Sitting Fair   Weight Shift Standing Fair   Gait Analysis   Gait Level Of Assist Supervised   Assistive Device Front Wheel Walker   Distance (Feet) 20   # of Times Distance was Traveled 1   Deviation Step To   Weight Bearing Status full   Bed Mobility    Supine to Sit Minimal Assist   Sit to Supine Minimal Assist   Scooting Supervised   Functional Mobility   Sit to Stand Supervised   Bed, Chair, Wheelchair Transfer Supervised   Transfer Method Stand Step   Activity Tolerance   Sitting Edge of Bed 8   Standing 6   Patient / Family Goals    Patient / Family Goal #1 Home   Short Term Goals    Short Term Goal # 1 Pt to be S with transfers in 6 V so can go home   Short Term Goal # 2 Pt to be S with amb x 100 feet in 6 V so can go home   Short Term Goal # 3 Pt to be S x 8 stairs in 6 V so can get into house   Problem List    Problems Impaired Transfers;Impaired Ambulation;Functional Strength Deficit;Decreased Activity Tolerance   Anticipated Discharge Equipment and Recommendations   DC Equipment Recommendations Unable to determine at this time   Discharge Recommendations   (unable to determine yet)   Interdisciplinary Plan of Care Collaboration   IDT Collaboration with  Nursing   Session Information   Date / Session Number  11/5-1 1/5 11/11       DC Equipment Recommendations: (P) Unable to determine at this time  Discharge Recommendations: (P)  (unable to determine yet)

## 2021-11-06 LAB
ALBUMIN SERPL BCP-MCNC: 2.9 G/DL (ref 3.2–4.9)
ALBUMIN/GLOB SERPL: 1 G/DL
ALP SERPL-CCNC: 93 U/L (ref 30–99)
ALT SERPL-CCNC: 32 U/L (ref 2–50)
ANION GAP SERPL CALC-SCNC: 12 MMOL/L (ref 7–16)
AST SERPL-CCNC: 40 U/L (ref 12–45)
BASOPHILS # BLD AUTO: 0.2 % (ref 0–1.8)
BASOPHILS # BLD: 0.02 K/UL (ref 0–0.12)
BILIRUB SERPL-MCNC: 1.3 MG/DL (ref 0.1–1.5)
BUN SERPL-MCNC: 26 MG/DL (ref 8–22)
CALCIUM SERPL-MCNC: 8.6 MG/DL (ref 8.4–10.2)
CHLORIDE SERPL-SCNC: 96 MMOL/L (ref 96–112)
CO2 SERPL-SCNC: 31 MMOL/L (ref 20–33)
CREAT SERPL-MCNC: 0.67 MG/DL (ref 0.5–1.4)
EOSINOPHIL # BLD AUTO: 0.01 K/UL (ref 0–0.51)
EOSINOPHIL NFR BLD: 0.1 % (ref 0–6.9)
ERYTHROCYTE [DISTWIDTH] IN BLOOD BY AUTOMATED COUNT: 55.6 FL (ref 35.9–50)
GLOBULIN SER CALC-MCNC: 3 G/DL (ref 1.9–3.5)
GLUCOSE BLD-MCNC: 122 MG/DL (ref 65–99)
GLUCOSE BLD-MCNC: 125 MG/DL (ref 65–99)
GLUCOSE BLD-MCNC: 146 MG/DL (ref 65–99)
GLUCOSE BLD-MCNC: 148 MG/DL (ref 65–99)
GLUCOSE SERPL-MCNC: 129 MG/DL (ref 65–99)
HCT VFR BLD AUTO: 57.5 % (ref 37–47)
HGB BLD-MCNC: 15.9 G/DL (ref 12–16)
IMM GRANULOCYTES # BLD AUTO: 0.04 K/UL (ref 0–0.11)
IMM GRANULOCYTES NFR BLD AUTO: 0.3 % (ref 0–0.9)
LYMPHOCYTES # BLD AUTO: 1.26 K/UL (ref 1–4.8)
LYMPHOCYTES NFR BLD: 10.3 % (ref 22–41)
MAGNESIUM SERPL-MCNC: 2.1 MG/DL (ref 1.5–2.5)
MCH RBC QN AUTO: 22.1 PG (ref 27–33)
MCHC RBC AUTO-ENTMCNC: 27.7 G/DL (ref 33.6–35)
MCV RBC AUTO: 79.9 FL (ref 81.4–97.8)
MONOCYTES # BLD AUTO: 1.16 K/UL (ref 0–0.85)
MONOCYTES NFR BLD AUTO: 9.4 % (ref 0–13.4)
NEUTROPHILS # BLD AUTO: 9.8 K/UL (ref 2–7.15)
NEUTROPHILS NFR BLD: 79.7 % (ref 44–72)
NRBC # BLD AUTO: 0 K/UL
NRBC BLD-RTO: 0 /100 WBC
PHOSPHATE SERPL-MCNC: 4.6 MG/DL (ref 2.5–4.5)
PLATELET # BLD AUTO: 322 K/UL (ref 164–446)
PMV BLD AUTO: 9.4 FL (ref 9–12.9)
POTASSIUM SERPL-SCNC: 3.8 MMOL/L (ref 3.6–5.5)
PROT SERPL-MCNC: 5.9 G/DL (ref 6–8.2)
RBC # BLD AUTO: 7.2 M/UL (ref 4.2–5.4)
SODIUM SERPL-SCNC: 139 MMOL/L (ref 135–145)
WBC # BLD AUTO: 12.3 K/UL (ref 4.8–10.8)

## 2021-11-06 PROCEDURE — 83735 ASSAY OF MAGNESIUM: CPT

## 2021-11-06 PROCEDURE — 94660 CPAP INITIATION&MGMT: CPT

## 2021-11-06 PROCEDURE — 84100 ASSAY OF PHOSPHORUS: CPT

## 2021-11-06 PROCEDURE — A9270 NON-COVERED ITEM OR SERVICE: HCPCS | Performed by: INTERNAL MEDICINE

## 2021-11-06 PROCEDURE — 82962 GLUCOSE BLOOD TEST: CPT

## 2021-11-06 PROCEDURE — 99291 CRITICAL CARE FIRST HOUR: CPT | Performed by: INTERNAL MEDICINE

## 2021-11-06 PROCEDURE — 700105 HCHG RX REV CODE 258: Performed by: INTERNAL MEDICINE

## 2021-11-06 PROCEDURE — 97535 SELF CARE MNGMENT TRAINING: CPT

## 2021-11-06 PROCEDURE — 94760 N-INVAS EAR/PLS OXIMETRY 1: CPT

## 2021-11-06 PROCEDURE — 80053 COMPREHEN METABOLIC PANEL: CPT

## 2021-11-06 PROCEDURE — 700111 HCHG RX REV CODE 636 W/ 250 OVERRIDE (IP): Performed by: INTERNAL MEDICINE

## 2021-11-06 PROCEDURE — 97116 GAIT TRAINING THERAPY: CPT

## 2021-11-06 PROCEDURE — 94640 AIRWAY INHALATION TREATMENT: CPT

## 2021-11-06 PROCEDURE — 700102 HCHG RX REV CODE 250 W/ 637 OVERRIDE(OP): Performed by: INTERNAL MEDICINE

## 2021-11-06 PROCEDURE — 97112 NEUROMUSCULAR REEDUCATION: CPT

## 2021-11-06 PROCEDURE — 97530 THERAPEUTIC ACTIVITIES: CPT

## 2021-11-06 PROCEDURE — 85025 COMPLETE CBC W/AUTO DIFF WBC: CPT

## 2021-11-06 PROCEDURE — 770020 HCHG ROOM/CARE - TELE (206)

## 2021-11-06 RX ORDER — FUROSEMIDE 10 MG/ML
20 INJECTION INTRAMUSCULAR; INTRAVENOUS
Status: DISCONTINUED | OUTPATIENT
Start: 2021-11-06 | End: 2021-11-07 | Stop reason: HOSPADM

## 2021-11-06 RX ORDER — POTASSIUM CHLORIDE 20 MEQ/1
40 TABLET, EXTENDED RELEASE ORAL ONCE
Status: COMPLETED | OUTPATIENT
Start: 2021-11-06 | End: 2021-11-06

## 2021-11-06 RX ORDER — DEXAMETHASONE SODIUM PHOSPHATE 4 MG/ML
6 INJECTION, SOLUTION INTRA-ARTICULAR; INTRALESIONAL; INTRAMUSCULAR; INTRAVENOUS; SOFT TISSUE DAILY
Status: DISCONTINUED | OUTPATIENT
Start: 2021-11-07 | End: 2021-11-07 | Stop reason: HOSPADM

## 2021-11-06 RX ORDER — CLONIDINE HYDROCHLORIDE 0.1 MG/1
0.2 TABLET ORAL TWICE DAILY
Status: DISCONTINUED | OUTPATIENT
Start: 2021-11-06 | End: 2021-11-07 | Stop reason: HOSPADM

## 2021-11-06 RX ADMIN — DEXAMETHASONE SODIUM PHOSPHATE 6 MG: 4 INJECTION, SOLUTION INTRA-ARTICULAR; INTRALESIONAL; INTRAMUSCULAR; INTRAVENOUS; SOFT TISSUE at 05:30

## 2021-11-06 RX ADMIN — AMPICILLIN SODIUM AND SULBACTAM SODIUM 3 G: 2; 1 INJECTION, POWDER, FOR SOLUTION INTRAMUSCULAR; INTRAVENOUS at 05:30

## 2021-11-06 RX ADMIN — HYDRALAZINE HYDROCHLORIDE 50 MG: 25 TABLET ORAL at 14:41

## 2021-11-06 RX ADMIN — LISINOPRIL 20 MG: 20 TABLET ORAL at 17:20

## 2021-11-06 RX ADMIN — HYDRALAZINE HYDROCHLORIDE 50 MG: 25 TABLET ORAL at 22:54

## 2021-11-06 RX ADMIN — HEPARIN SODIUM 5000 UNITS: 5000 INJECTION, SOLUTION INTRAVENOUS; SUBCUTANEOUS at 22:54

## 2021-11-06 RX ADMIN — AMPICILLIN SODIUM AND SULBACTAM SODIUM 3 G: 2; 1 INJECTION, POWDER, FOR SOLUTION INTRAMUSCULAR; INTRAVENOUS at 11:54

## 2021-11-06 RX ADMIN — FUROSEMIDE 20 MG: 10 INJECTION, SOLUTION INTRAMUSCULAR; INTRAVENOUS at 16:24

## 2021-11-06 RX ADMIN — METOPROLOL TARTRATE 200 MG: 50 TABLET, FILM COATED ORAL at 05:31

## 2021-11-06 RX ADMIN — POTASSIUM CHLORIDE 40 MEQ: 1500 TABLET, EXTENDED RELEASE ORAL at 08:03

## 2021-11-06 RX ADMIN — HYDRALAZINE HYDROCHLORIDE 50 MG: 25 TABLET ORAL at 05:31

## 2021-11-06 RX ADMIN — BUDESONIDE AND FORMOTEROL FUMARATE DIHYDRATE 2 PUFF: 160; 4.5 AEROSOL RESPIRATORY (INHALATION) at 20:17

## 2021-11-06 RX ADMIN — CLONIDINE HYDROCHLORIDE 0.2 MG: 0.1 TABLET ORAL at 10:07

## 2021-11-06 RX ADMIN — HEPARIN SODIUM 5000 UNITS: 5000 INJECTION, SOLUTION INTRAVENOUS; SUBCUTANEOUS at 14:41

## 2021-11-06 RX ADMIN — CLONIDINE HYDROCHLORIDE 0.2 MG: 0.1 TABLET ORAL at 18:30

## 2021-11-06 RX ADMIN — AMLODIPINE BESYLATE 10 MG: 5 TABLET ORAL at 05:31

## 2021-11-06 RX ADMIN — BUDESONIDE AND FORMOTEROL FUMARATE DIHYDRATE 2 PUFF: 160; 4.5 AEROSOL RESPIRATORY (INHALATION) at 07:53

## 2021-11-06 RX ADMIN — HEPARIN SODIUM 5000 UNITS: 5000 INJECTION, SOLUTION INTRAVENOUS; SUBCUTANEOUS at 05:30

## 2021-11-06 RX ADMIN — LISINOPRIL 20 MG: 20 TABLET ORAL at 05:31

## 2021-11-06 RX ADMIN — AMPICILLIN SODIUM AND SULBACTAM SODIUM 3 G: 2; 1 INJECTION, POWDER, FOR SOLUTION INTRAMUSCULAR; INTRAVENOUS at 17:19

## 2021-11-06 RX ADMIN — FUROSEMIDE 20 MG: 10 INJECTION, SOLUTION INTRAMUSCULAR; INTRAVENOUS at 10:07

## 2021-11-06 RX ADMIN — METOPROLOL TARTRATE 200 MG: 50 TABLET, FILM COATED ORAL at 17:20

## 2021-11-06 RX ADMIN — AMPICILLIN SODIUM AND SULBACTAM SODIUM 3 G: 2; 1 INJECTION, POWDER, FOR SOLUTION INTRAMUSCULAR; INTRAVENOUS at 22:54

## 2021-11-06 ASSESSMENT — ENCOUNTER SYMPTOMS
ABDOMINAL PAIN: 0
SINUS PAIN: 0
STRIDOR: 0
BACK PAIN: 1
SENSORY CHANGE: 0
WEIGHT LOSS: 0
FEVER: 0
ORTHOPNEA: 0
CHILLS: 0
DIARRHEA: 0
SHORTNESS OF BREATH: 0
NAUSEA: 0
HEADACHES: 0
PSYCHIATRIC NEGATIVE: 1
MYALGIAS: 0
WHEEZING: 0
SORE THROAT: 1
VOMITING: 0
EYE DISCHARGE: 0
LOSS OF CONSCIOUSNESS: 0
FOCAL WEAKNESS: 0
DIZZINESS: 0
EYE PAIN: 0
SPUTUM PRODUCTION: 0
COUGH: 0

## 2021-11-06 ASSESSMENT — GAIT ASSESSMENTS
DISTANCE (FEET): 75
DEVIATION: STEP TO
GAIT LEVEL OF ASSIST: SUPERVISED

## 2021-11-06 ASSESSMENT — PAIN DESCRIPTION - PAIN TYPE
TYPE: ACUTE PAIN

## 2021-11-06 ASSESSMENT — PULMONARY FUNCTION TESTS: EPAP_CMH2O: 8

## 2021-11-06 ASSESSMENT — FIBROSIS 4 INDEX: FIB4 SCORE: 1.03

## 2021-11-06 NOTE — PROGRESS NOTES
Critical Care Progress Note    Date of admission  10/31/2021    Chief Complaint  46 y.o. female admitted 10/31/2021 with hyponatremia and acute hypoxemic respiratory failure in the setting of COVID-19    Hospital Course  46 y.o. female admitted 10/31/2021 with HTN, morbid obesity, secondary polycythemia brought in by VIKTORSA with worsening SOB. Rapid COVID test + yesterday and on 3L oxygen was saturating 72%. Pt was altered.    Labs in ER showed Na of 116, altered and hypoxic, cyanotic and in extreme distress  Extremely difficult intubation but pt finally intubated with 6.0 ET tube  Transferred to the ICU for further management - per Dr Juares admit H&P 10/31    Interval Problem Update  Chart review from the past 24 hours includes imaging, laboratory studies, vital signs and notes available.  Pertinent data for today's visit includes    Respiratory/airway stable  No stridor  No acute events overnight      Cardiac: BP slightly improved, more frequent PVCs on telemetry, -170s despite multiple antihypertensives  Pulm: aerosol mask 50%, BIPAP 12/8 50% overnight, saturating low 90s.   Neuro: no focal deficits, family reports some confusion but AOx4  Heme: chronic polycythemia stable, leukocytosis stable  I/O:  -4.4L, Cr< 1  ID:  WBC improving, defervesed, started unasyn 11/4 for suspected R submandibular sialadenitis. COVID + 10/30. Decadron. S/p tocilizumab 11/1. Pancultured, NGTD.   GI/endo: modified diet by SLP tolerating well  Labs/Imaging: Na normalized  Lines: triple lumen IJ, browne removed overnight  Mobility: bedrest  Symptoms: AOx4, neck swelling stable    Review of Systems  Review of Systems   Constitutional: Negative for chills, fever and weight loss.   HENT: Positive for sore throat. Negative for congestion and sinus pain.    Eyes: Negative for pain and discharge.   Respiratory: Negative for cough, sputum production, shortness of breath, wheezing and stridor.    Cardiovascular: Negative for chest pain,  orthopnea and leg swelling.   Gastrointestinal: Negative for abdominal pain, diarrhea, nausea and vomiting.   Genitourinary: Negative for dysuria, frequency and urgency.   Musculoskeletal: Positive for back pain. Negative for myalgias.   Skin: Negative for rash.   Neurological: Negative for dizziness, sensory change, focal weakness, loss of consciousness and headaches.   Psychiatric/Behavioral: Negative.    All other systems reviewed and are negative.     Vital Signs for last 24 hours   Temp:  [36.1 °C (96.9 °F)-37.7 °C (99.9 °F)] 36.1 °C (96.9 °F)  Pulse:  [] 79  Resp:  [15-53] 16  BP: (158-185)/() 158/117  SpO2:  [89 %-99 %] 94 %    Physical Exam   Physical Exam  Vitals and nursing note reviewed.   Constitutional:       General: She is not in acute distress.     Appearance: She is well-developed. She is obese. She is not ill-appearing or diaphoretic.   Eyes:      General: No scleral icterus.        Right eye: No discharge.         Left eye: No discharge.   Neck:      Thyroid: No thyromegaly.      Vascular: No JVD.      Comments: large submandibular bruising/swelling  Cardiovascular:      Rate and Rhythm: Regular rhythm. Tachycardia present.      Heart sounds: Normal heart sounds. No murmur heard.   No gallop.    Pulmonary:      Effort: No respiratory distress.      Breath sounds: Normal breath sounds. No wheezing or rales.      Comments: Distant bilateral breath sounds  Abdominal:      General: There is distension.      Palpations: Abdomen is soft.      Tenderness: There is no abdominal tenderness. There is no guarding.   Musculoskeletal:         General: No tenderness.      Cervical back: No rigidity.   Lymphadenopathy:      Cervical: No cervical adenopathy.   Skin:     General: Skin is warm.      Capillary Refill: Capillary refill takes less than 2 seconds.      Findings: No erythema or rash.   Neurological:      General: No focal deficit present.      Mental Status: She is alert and oriented to  person, place, and time.      Cranial Nerves: No cranial nerve deficit.      Sensory: No sensory deficit.      Motor: No abnormal muscle tone.   Psychiatric:         Behavior: Behavior normal.       Medications  Current Facility-Administered Medications   Medication Dose Route Frequency Provider Last Rate Last Admin   • hydrALAZINE (APRESOLINE) tablet 50 mg  50 mg Oral Q8HRS Deniz Boyce M.D.   50 mg at 11/06/21 0531   • cloNIDine (CATAPRES) tablet 0.2 mg  0.2 mg Oral Q8HRS PRN Deniz Boyce M.D.   0.2 mg at 11/05/21 1130   • hydrALAZINE (APRESOLINE) injection 20 mg  20 mg Intravenous Q6HRS PRN Deniz Boyce M.D.       • metoprolol tartrate (LOPRESSOR) tablet 200 mg  200 mg Oral TWICE DAILY Deniz Boyce M.D.   200 mg at 11/06/21 0531   • dexamethasone (DECADRON) injection 6 mg  6 mg Intravenous Q12HRS Deniz Boyce M.D.   6 mg at 11/06/21 0530   • amLODIPine (NORVASC) tablet 10 mg  10 mg Oral Q DAY Deniz Boyce M.D.   10 mg at 11/06/21 0531   • senna-docusate (PERICOLACE or SENOKOT S) 8.6-50 MG per tablet 2 Tablet  2 Tablet Oral BID Deniz Boyce M.D.   2 Tablet at 11/04/21 1723    And   • polyethylene glycol/lytes (MIRALAX) PACKET 1 Packet  1 Packet Oral QDAY PRN Deniz Boyce M.D.        And   • magnesium hydroxide (MILK OF MAGNESIA) suspension 30 mL  30 mL Oral QDAY PRN Deniz Boyce M.D.        And   • bisacodyl (DULCOLAX) suppository 10 mg  10 mg Rectal QDAY PRN Deniz Boyce M.D.       • acetaminophen (Tylenol) tablet 650 mg  650 mg Oral Q6HRS PRN Deniz Boyce M.D.       • ondansetron (ZOFRAN ODT) dispertab 4 mg  4 mg Oral Q6HRS PRN Deniz Boyce M.D.       • lisinopril (PRINIVIL) tablet 20 mg  20 mg Oral TWICE DAILY Deniz Boyce M.D.   20 mg at 11/06/21 0531   • enalaprilat (Vasotec) injection 2.5 mg 2 mL  2.5 mg Intravenous Q6HRS PRN Deniz Boyce M.D.       • ampicillin/sulbactam (UNASYN) 3 g in  mL IVPB  3 g Intravenous Q6HRS Deniz VALENZUELA  LUC Boyce   Stopped at 11/06/21 0600   • budesonide-formoterol (SYMBICORT) 160-4.5 MCG/ACT inhaler 2 Puff  2 Puff Inhalation BID (RT) Deniz Boyce M.D.   2 Puff at 11/06/21 0753   • ondansetron (ZOFRAN) syringe/vial injection 4 mg  4 mg Intravenous Q6HRS PRN Alan Rios M.D.       • heparin injection 5,000 Units  5,000 Units Subcutaneous Q8HRS Alan Rios M.D.   5,000 Units at 11/06/21 0530   • insulin regular (HumuLIN R,NovoLIN R) injection  3-14 Units Subcutaneous Q6HRS Alan Rios M.D.   3 Units at 11/05/21 1134    And   • glucose 4 g chewable tablet 16 g  16 g Oral Q15 MIN PRN Alan Rios M.D.        And   • dextrose 50% (D50W) injection 50 mL  50 mL Intravenous Q15 MIN PRN Alan Rios M.D.       • Respiratory Therapy Consult   Nebulization Continuous RT Alan Rios M.D.       • MD Alert...ICU Electrolyte Replacement per Pharmacy   Other PHARMACY TO DOSE Alan Rios M.D.       • lidocaine (XYLOCAINE) 1 % injection 2 mL  2 mL Tracheal Tube Q30 MIN PRN Alan Rios M.D.           Fluids    Intake/Output Summary (Last 24 hours) at 11/6/2021 0846  Last data filed at 11/6/2021 0600  Gross per 24 hour   Intake 650 ml   Output 1850 ml   Net -1200 ml       Laboratory  Recent Labs     11/05/21  0310   ISTATAPH 7.441   ISTATAPCO2 53.5*   ISTATAPO2 72   ISTATATCO2 38*   LTCFDWF6GMG 95   ISTATARTHCO3 36.4*   ISTATARTBE 9*   ISTATTEMP 96.4 F   ISTATFIO2 50   ISTATSPEC Arterial   ISTATAPHTC 7.459   ALZCVWSM6GD 67         Recent Labs     11/04/21  0530 11/05/21  0401 11/06/21  0417   SODIUM 135 135 139   POTASSIUM 3.7 3.8 3.8   CHLORIDE 93* 94* 96   CO2 30 33 31   BUN 19 22 26*   CREATININE 0.73 0.59 0.67   MAGNESIUM 1.9 2.3 2.1   PHOSPHORUS 3.7 4.3 4.6*   CALCIUM 8.5 8.4 8.6     Recent Labs     11/04/21  0530 11/05/21  0401 11/06/21  0417   ALTSGPT 20 18 32   ASTSGOT 39 23 40   ALKPHOSPHAT 100* 85 93   TBILIRUBIN 1.1 0.9 1.3   GLUCOSE 156*  156* 129*     Recent Labs     11/04/21  0530 11/05/21  0401 11/06/21  0417   WBC 16.3* 12.7* 12.3*   NEUTSPOLYS 84.70* 87.00* 79.70*   LYMPHOCYTES 7.40* 6.50* 10.30*   MONOCYTES 7.30 5.70 9.40   EOSINOPHILS 0.00 0.00 0.10   BASOPHILS 0.20 0.20 0.20   ASTSGOT 39 23 40   ALTSGPT 20 18 32   ALKPHOSPHAT 100* 85 93   TBILIRUBIN 1.1 0.9 1.3     Recent Labs     11/04/21  0530 11/05/21  0401 11/06/21  0417   RBC 7.73* 7.26* 7.20*   HEMOGLOBIN 17.0* 16.2* 15.9   HEMATOCRIT 60.6* 58.0* 57.5*   PLATELETCT 372 311 322     Imaging  CT:    Reviewed, tonsillar swelling suspect 2/2 trauma, unlikely abscess/neoplasm    Assessment/Plan    * Acute hypoxemic respiratory failure due to COVID-19 (HCC)- (present on admission)  Assessment & Plan  Intubated 10/31/21, 6.0 ETT, challenging intubation  Self extubated 11/2 despite restraints and adequate sedation  Doing well on aerosol mask, cont to wean FiO2 to maintain sats low 90s  Taper decadron from 6mg BID -> QD  s/p tocilimuzab 11/1/2021   Start lasix 20mg IV BID  Refusing proning despite extensive education and counseling by RN, RT, MD  Cont PT/OT/SLP  Strict droplet, contact and  Eye protection    Sialadenitis  Assessment & Plan  Suspect 2/2 trauma from difficult intubation on 10/31  Airway patent, no stridor  Breathing comfortably  Passed swallow eval  CT reviewed, have reached out to ENT to review imaging to confirm no further needs  ----  Plan for conservative management, taper decadron to minimize swelling/inflammation  Closely monitor for signs/symptoms of airway compromise  Unasyn x 7 days  Humidify supplemental O2  Cont modified diet    Hypertensive urgency  Assessment & Plan  Cont  BID, amlodipine 10, lisinopril 20, hydralazine 50mg TID  Add clonidine 0.2mg BID  Cont hydralazine, enaliprit PRN SBP > 180    Polycythemia vera (HCC)- (present on admission)  Assessment & Plan  Likely secondary from nocturnal and maybe daytime hypoxemia  No indication for therapeutic  phlebotomy   Cont nocturnal BIPAP, tolerating well. Pt has BIPAP approved through [facility]- PCP Nataly Vaughn. Will coordinate with SW/case management to see if we can get device here while she is hospitalized to get set up/titration etc prior to going home.    Morbid obesity with BMI of 50.0-59.9, adult (HCC)  Assessment & Plan  Comorbidity complicating ventilator synchrony, mechanics  Also associated with severe disease in COVID     VTE:  Heparin  Ulcer: H2 Antagonist  Lines: Central Line  ordered for removal and Orr Catheter  ordered for removal    I have performed a physical exam and reviewed and updated ROS and Plan today (11/6/2021). In review of yesterday's note (11/5/2021), there are no changes except as documented above.     Discussed patient condition and risk of morbidity and/or mortality with Family, RN, RT, Pharmacy and Charge nurse / hot rounds     The patient remains critically ill.  Critical care time = 50 minutes in directly providing and coordinating critical care and extensive data review.  No time overlap and excludes procedures.    This note was generated using voice recognition software which has a chance of producing errors of grammar and content.  I have made every reasonable attempt to find and correct any errors, but it should be expected that some may not be found prior to finalization of this note.  __________  Deniz Boyce MD  Pulmonary and Critical Care Medicine  [facility]

## 2021-11-06 NOTE — CARE PLAN
The patient is Watcher - Medium risk of patient condition declining or worsening    Shift Goals  Clinical Goals: maintain O2 sats  Patient Goals: sleep  Family Goals: support patient     Progress made toward(s) clinical / shift goals:  Pt able to eat some nutrition but stated that it did taste good. Pt compliant with plan of care. Pt understands the importance of using call light.     Patient is not progressing towards the following goals:      Problem: Knowledge Deficit - Standard  Goal: Patient and family/care givers will demonstrate understanding of plan of care, disease process/condition, diagnostic tests and medications  Outcome: Progressing     Problem: Communication  Goal: The ability to communicate needs accurately and effectively will improve  Outcome: Progressing     Problem: Discharge Barriers/Planning  Goal: Patient's continuum of care needs are met  Outcome: Progressing     Problem: Respiratory  Goal: Patient will achieve/maintain optimum respiratory ventilation and gas exchange  Outcome: Progressing     Problem: Nutrition  Goal: Patient's nutritional and fluid intake will be adequate or improve  Outcome: Progressing

## 2021-11-06 NOTE — THERAPY
Occupational Therapy  Daily Treatment     Patient Name: Adri Maldonado  Age:  46 y.o., Sex:  female  Medical Record #: [id number]  Today's Date: 11/6/2021     Precautions  Precautions: Fall Risk, Swallow Precautions ( See Comments)    Assessment  Pt is motivated for activity. O2@2L NC. Pt shows improved balance,activity tolerance for ADL's; see below for details. Steady with no AD. Tolerates UIC post session.     Plan  Continue current treatment plan.    DC Equipment Recommendations: (P) None  Discharge Recommendations: (P) Anticipate that the patient will have no further occupational therapy needs after discharge from the hospital     11/06/21 1614   Cognition    Cognition / Consciousness WDL   Level of Consciousness Alert   Balance   Sitting Balance (Static) Good   Sitting Balance (Dynamic) Good   Standing Balance (Static) Fair +   Standing Balance (Dynamic) Fair +   Weight Shift Sitting Good   Weight Shift Standing Good   Skilled Intervention Verbal Cuing   Bed Mobility    Supine to Sit Supervised   Sit to Supine Supervised   Scooting Independent   Activities of Daily Living   Grooming Independent   Lower Body Dressing Supervision   Toileting Supervision   Skilled Intervention Verbal Cuing   Functional Mobility   Sit to Stand Supervised   Bed, Chair, Wheelchair Transfer Supervised   Transfer Method Stand Step   Activity Tolerance   Sitting in Chair >1hr   Sitting Edge of Bed 10   Standing 10   Patient / Family Goals   Goal #1 Outcome Progressing as expected   Short Term Goals   Goal Outcome # 1 Progressing as expected   Goal Outcome # 2 Progressing as expected   Goal Outcome # 3 Progressing as expected

## 2021-11-06 NOTE — THERAPY
Physical Therapy   Daily Treatment     Patient Name: Adri Maldonado  Age:  46 y.o., Sex:  female  Medical Record #: [id number]  Today's Date: 11/6/2021          Assessment    Pt continues to show excellent improvement 3 litres 02 88% saturation with ambulation and sit to stand    Plan    Continue current treatment plan.      11/06/21 1600   Total Time Spent   Total Time Spent (Mins) 30   Charge Group   PT Gait Training 1   PT Therapeutic Activities 1   Sitting Lower Body Exercises   Sitting Lower Body Exercises Yes   Standing Lower Body Exercises   Standing Lower Body Exercises Yes   Balance   Sitting Balance (Static) Good   Sitting Balance (Dynamic) Good   Standing Balance (Static) Fair +   Standing Balance (Dynamic) Fair +   Weight Shift Sitting Good   Weight Shift Standing Good   Gait Analysis   Gait Level Of Assist Supervised   Assistive Device None   Distance (Feet) 75   # of Times Distance was Traveled 1   Deviation Step To   # of Stairs Climbed 0   Weight Bearing Status full   Bed Mobility    Supine to Sit Supervised   Sit to Supine Supervised   Scooting Supervised   Functional Mobility   Sit to Stand Supervised   Bed, Chair, Wheelchair Transfer Supervised   Transfer Method Stand Step   Activity Tolerance   Sitting Edge of Bed 10   Standing 10   Short Term Goals    Short Term Goal # 1 Pt to be S with transfers in 6 V so can go home   Goal Outcome # 1 Progressing as expected   Short Term Goal # 2 Pt to be S with amb x 100 feet in 6 V so can go home   Goal Outcome # 2 Progressing as expected   Short Term Goal # 3 Pt to be S x 8 stairs in 6 V so can get into house   Goal Outcome # 3 Goal not met   Anticipated Discharge Equipment and Recommendations   Discharge Recommendations Recommend home health for continued physical therapy services   Interdisciplinary Plan of Care Collaboration   IDT Collaboration with  Nursing   Session Information   Date / Session Number  11/6-2 2/5 11/11       DC Equipment  Recommendations: Unable to determine at this time  Discharge Recommendations: (P) Recommend home health for continued physical therapy services

## 2021-11-06 NOTE — PROGRESS NOTES
Report received from LOGAN Emery. Patient resting in bed with CPAP on. Patient placed on aerosol mask, then 2L nasal cannula and is tolerating well. Explained plan of care for today, including getting up to a chair. Also, discussed the patient's ring with her. It is downstairs in safe at the moment and only the patient herself is able to retrieve it. Call lights and belongings within reach, no other needs at this time.

## 2021-11-06 NOTE — PROGRESS NOTES
Report received from LOGAN Emery. Patient resting in bed on BIPAP with sats 91-94%. Plan of care discussed with patient. No needs at this time. Call light and belongings within reach. Will continue with plan of care.

## 2021-11-06 NOTE — CARE PLAN
The patient is Watcher - Medium risk of patient condition declining or worsening    Shift Goals  Clinical Goals: maintain O2 sats  Patient Goals: up to chair  Family Goals: support patient     Progress made toward(s) clinical / shift goals:    Problem: Psychosocial  Goal: Patient's level of anxiety will decrease  Outcome: Not Progressing     Problem: Knowledge Deficit - Standard  Goal: Patient and family/care givers will demonstrate understanding of plan of care, disease process/condition, diagnostic tests and medications  Outcome: Progressing     Problem: Pain - Standard  Goal: Alleviation of pain or a reduction in pain to the patient’s comfort goal  Outcome: Progressing     Problem: Skin Integrity  Goal: Skin integrity is maintained or improved  Outcome: Progressing     Problem: Psychosocial  Goal: Patient's ability to verbalize feelings about condition will improve  Outcome: Progressing  Goal: Patient's ability to re-evaluate and adapt role responsibilities will improve  Outcome: Progressing  Goal: Patient and family will demonstrate ability to cope with life altering diagnosis and/or procedure  Outcome: Progressing       Patient is not progressing towards the following goals:      Problem: Psychosocial  Goal: Patient's level of anxiety will decrease  Outcome: Not Progressing

## 2021-11-07 ENCOUNTER — TELEPHONE (OUTPATIENT)
Dept: OTHER | Facility: MEDICAL CENTER | Age: 46
End: 2021-11-07

## 2021-11-07 VITALS — RESPIRATION RATE: 17 BRPM | HEART RATE: 92 BPM | OXYGEN SATURATION: 94 %

## 2021-11-07 VITALS
HEIGHT: 65 IN | DIASTOLIC BLOOD PRESSURE: 106 MMHG | OXYGEN SATURATION: 90 % | SYSTOLIC BLOOD PRESSURE: 176 MMHG | HEART RATE: 75 BPM | TEMPERATURE: 98.6 F | WEIGHT: 293 LBS | RESPIRATION RATE: 18 BRPM | BODY MASS INDEX: 48.82 KG/M2

## 2021-11-07 PROBLEM — J12.82 PNEUMONIA DUE TO COVID-19 VIRUS: Status: RESOLVED | Noted: 2021-10-31 | Resolved: 2021-11-07

## 2021-11-07 PROBLEM — J96.21 ACUTE ON CHRONIC RESPIRATORY FAILURE WITH HYPOXEMIA (HCC): Status: ACTIVE | Noted: 2021-11-07

## 2021-11-07 PROBLEM — G47.33 OSA (OBSTRUCTIVE SLEEP APNEA): Status: ACTIVE | Noted: 2021-11-07

## 2021-11-07 PROBLEM — I16.0 HYPERTENSIVE URGENCY: Status: RESOLVED | Noted: 2019-06-29 | Resolved: 2021-11-07

## 2021-11-07 PROBLEM — J96.01 ACUTE HYPOXEMIC RESPIRATORY FAILURE DUE TO COVID-19 (HCC): Status: RESOLVED | Noted: 2021-10-31 | Resolved: 2021-11-07

## 2021-11-07 PROBLEM — J12.82 PNEUMONIA DUE TO COVID-19 VIRUS: Status: ACTIVE | Noted: 2021-10-31

## 2021-11-07 PROBLEM — J96.21 ACUTE ON CHRONIC RESPIRATORY FAILURE WITH HYPOXEMIA (HCC): Status: RESOLVED | Noted: 2021-11-07 | Resolved: 2021-11-07

## 2021-11-07 PROBLEM — U07.1 ACUTE HYPOXEMIC RESPIRATORY FAILURE DUE TO COVID-19 (HCC): Status: RESOLVED | Noted: 2021-10-31 | Resolved: 2021-11-07

## 2021-11-07 PROBLEM — J45.909 ASTHMA: Status: ACTIVE | Noted: 2021-11-07

## 2021-11-07 PROBLEM — J44.9 COPD (CHRONIC OBSTRUCTIVE PULMONARY DISEASE) (HCC): Chronic | Status: ACTIVE | Noted: 2021-11-07

## 2021-11-07 LAB
ALBUMIN SERPL BCP-MCNC: 3.1 G/DL (ref 3.2–4.9)
ALBUMIN/GLOB SERPL: 1 G/DL
ALP SERPL-CCNC: 87 U/L (ref 30–99)
ALT SERPL-CCNC: 58 U/L (ref 2–50)
ANION GAP SERPL CALC-SCNC: 8 MMOL/L (ref 7–16)
AST SERPL-CCNC: 61 U/L (ref 12–45)
BACTERIA BLD CULT: NORMAL
BACTERIA BLD CULT: NORMAL
BILIRUB SERPL-MCNC: 1.3 MG/DL (ref 0.1–1.5)
BUN SERPL-MCNC: 31 MG/DL (ref 8–22)
CALCIUM SERPL-MCNC: 8.6 MG/DL (ref 8.4–10.2)
CHLORIDE SERPL-SCNC: 96 MMOL/L (ref 96–112)
CO2 SERPL-SCNC: 34 MMOL/L (ref 20–33)
CREAT SERPL-MCNC: 0.58 MG/DL (ref 0.5–1.4)
GLOBULIN SER CALC-MCNC: 3.1 G/DL (ref 1.9–3.5)
GLUCOSE BLD-MCNC: 113 MG/DL (ref 65–99)
GLUCOSE BLD-MCNC: 121 MG/DL (ref 65–99)
GLUCOSE SERPL-MCNC: 106 MG/DL (ref 65–99)
MAGNESIUM SERPL-MCNC: 1.8 MG/DL (ref 1.5–2.5)
PHOSPHATE SERPL-MCNC: 4.9 MG/DL (ref 2.5–4.5)
POTASSIUM SERPL-SCNC: 4 MMOL/L (ref 3.6–5.5)
PROT SERPL-MCNC: 6.2 G/DL (ref 6–8.2)
SIGNIFICANT IND 70042: NORMAL
SIGNIFICANT IND 70042: NORMAL
SITE SITE: NORMAL
SITE SITE: NORMAL
SODIUM SERPL-SCNC: 138 MMOL/L (ref 135–145)
SOURCE SOURCE: NORMAL
SOURCE SOURCE: NORMAL

## 2021-11-07 PROCEDURE — 700111 HCHG RX REV CODE 636 W/ 250 OVERRIDE (IP): Performed by: INTERNAL MEDICINE

## 2021-11-07 PROCEDURE — 99453 REM MNTR PHYSIOL PARAM SETUP: CPT

## 2021-11-07 PROCEDURE — A9270 NON-COVERED ITEM OR SERVICE: HCPCS | Performed by: INTERNAL MEDICINE

## 2021-11-07 PROCEDURE — 83735 ASSAY OF MAGNESIUM: CPT

## 2021-11-07 PROCEDURE — 99239 HOSP IP/OBS DSCHRG MGMT >30: CPT | Performed by: STUDENT IN AN ORGANIZED HEALTH CARE EDUCATION/TRAINING PROGRAM

## 2021-11-07 PROCEDURE — 99233 SBSQ HOSP IP/OBS HIGH 50: CPT | Performed by: INTERNAL MEDICINE

## 2021-11-07 PROCEDURE — 82962 GLUCOSE BLOOD TEST: CPT

## 2021-11-07 PROCEDURE — 94760 N-INVAS EAR/PLS OXIMETRY 1: CPT

## 2021-11-07 PROCEDURE — 700105 HCHG RX REV CODE 258: Performed by: INTERNAL MEDICINE

## 2021-11-07 PROCEDURE — 94640 AIRWAY INHALATION TREATMENT: CPT

## 2021-11-07 PROCEDURE — 700102 HCHG RX REV CODE 250 W/ 637 OVERRIDE(OP): Performed by: INTERNAL MEDICINE

## 2021-11-07 PROCEDURE — 99454 REM MNTR PHYSIOL PARAM 16-30: CPT

## 2021-11-07 PROCEDURE — 80053 COMPREHEN METABOLIC PANEL: CPT

## 2021-11-07 PROCEDURE — 84100 ASSAY OF PHOSPHORUS: CPT

## 2021-11-07 PROCEDURE — 94660 CPAP INITIATION&MGMT: CPT

## 2021-11-07 RX ORDER — DEXAMETHASONE 4 MG/1
TABLET ORAL
Qty: 5 TABLET | Refills: 0 | Status: SHIPPED | OUTPATIENT
Start: 2021-11-08 | End: 2021-11-13

## 2021-11-07 RX ORDER — FUROSEMIDE 40 MG/1
40 TABLET ORAL DAILY
Qty: 30 TABLET | Refills: 0 | Status: SHIPPED | OUTPATIENT
Start: 2021-11-07 | End: 2021-12-13 | Stop reason: SDUPTHER

## 2021-11-07 RX ORDER — BUDESONIDE AND FORMOTEROL FUMARATE DIHYDRATE 160; 4.5 UG/1; UG/1
2 AEROSOL RESPIRATORY (INHALATION) 2 TIMES DAILY
Qty: 1 EACH | Refills: 2 | Status: SHIPPED | OUTPATIENT
Start: 2021-11-07 | End: 2023-01-11 | Stop reason: SDUPTHER

## 2021-11-07 RX ORDER — FUROSEMIDE 10 MG/ML
40 INJECTION INTRAMUSCULAR; INTRAVENOUS ONCE
Status: DISCONTINUED | OUTPATIENT
Start: 2021-11-07 | End: 2021-11-07 | Stop reason: HOSPADM

## 2021-11-07 RX ORDER — AMLODIPINE BESYLATE 10 MG/1
10 TABLET ORAL DAILY
Qty: 30 TABLET | Refills: 1 | Status: SHIPPED | OUTPATIENT
Start: 2021-11-08 | End: 2021-12-13

## 2021-11-07 RX ORDER — DEXAMETHASONE 4 MG/1
6 TABLET ORAL DAILY
Qty: 6 TABLET | Refills: 0 | Status: SHIPPED | OUTPATIENT
Start: 2021-11-08 | End: 2021-11-07 | Stop reason: SDUPTHER

## 2021-11-07 RX ORDER — CARVEDILOL 12.5 MG/1
25 TABLET ORAL 2 TIMES DAILY WITH MEALS
Qty: 60 TABLET | Refills: 0 | Status: SHIPPED | OUTPATIENT
Start: 2021-11-07 | End: 2021-11-22 | Stop reason: SDUPTHER

## 2021-11-07 RX ORDER — AMOXICILLIN AND CLAVULANATE POTASSIUM 875; 125 MG/1; MG/1
1 TABLET, FILM COATED ORAL 2 TIMES DAILY
Qty: 8 TABLET | Refills: 0 | Status: SHIPPED | OUTPATIENT
Start: 2021-11-07 | End: 2021-11-11

## 2021-11-07 RX ADMIN — LISINOPRIL 20 MG: 20 TABLET ORAL at 05:41

## 2021-11-07 RX ADMIN — AMLODIPINE BESYLATE 10 MG: 5 TABLET ORAL at 05:40

## 2021-11-07 RX ADMIN — AMPICILLIN SODIUM AND SULBACTAM SODIUM 3 G: 2; 1 INJECTION, POWDER, FOR SOLUTION INTRAMUSCULAR; INTRAVENOUS at 05:47

## 2021-11-07 RX ADMIN — FUROSEMIDE 20 MG: 10 INJECTION, SOLUTION INTRAMUSCULAR; INTRAVENOUS at 05:38

## 2021-11-07 RX ADMIN — DEXAMETHASONE SODIUM PHOSPHATE 6 MG: 4 INJECTION, SOLUTION INTRA-ARTICULAR; INTRALESIONAL; INTRAMUSCULAR; INTRAVENOUS; SOFT TISSUE at 05:38

## 2021-11-07 RX ADMIN — BUDESONIDE AND FORMOTEROL FUMARATE DIHYDRATE 2 PUFF: 160; 4.5 AEROSOL RESPIRATORY (INHALATION) at 06:30

## 2021-11-07 RX ADMIN — METOPROLOL TARTRATE 200 MG: 50 TABLET, FILM COATED ORAL at 05:41

## 2021-11-07 RX ADMIN — CLONIDINE HYDROCHLORIDE 0.2 MG: 0.1 TABLET ORAL at 05:41

## 2021-11-07 RX ADMIN — HYDRALAZINE HYDROCHLORIDE 50 MG: 25 TABLET ORAL at 05:39

## 2021-11-07 RX ADMIN — HEPARIN SODIUM 5000 UNITS: 5000 INJECTION, SOLUTION INTRAVENOUS; SUBCUTANEOUS at 05:38

## 2021-11-07 ASSESSMENT — ENCOUNTER SYMPTOMS
NAUSEA: 0
ORTHOPNEA: 0
BACK PAIN: 1
STRIDOR: 0
HEADACHES: 0
COUGH: 0
FEVER: 0
DIZZINESS: 0
WHEEZING: 0
DIARRHEA: 0
SENSORY CHANGE: 0
WEIGHT LOSS: 0
VOMITING: 0
MYALGIAS: 0
SPUTUM PRODUCTION: 0
CHILLS: 0
ABDOMINAL PAIN: 0
SINUS PAIN: 0
EYE DISCHARGE: 0
LOSS OF CONSCIOUSNESS: 0
FOCAL WEAKNESS: 0
SORE THROAT: 1
EYE PAIN: 0
SHORTNESS OF BREATH: 0
PSYCHIATRIC NEGATIVE: 1

## 2021-11-07 NOTE — DISCHARGE PLANNING
Anticipated Discharge Disposition:   Home when medically cleared     Action:   Chart review complete.     Per MD, patient to discharge home with no needs. Walking O2 completed and patient may need oxygen. Patient was previously on service with [employer].     RN BOB will continue to follow for orders.     Barriers to Discharge:   Medical Clearance    Plan:   Hospital care management will continue to assist with discharge planning needs.

## 2021-11-07 NOTE — ASSESSMENT & PLAN NOTE
Pt with [facility]  Pt has BIPAP approved through [facility]- PCP Lorna Vaughn. Plan was to get mask/machine prior to discharge, but patient is insisting to go home today which I advised against. She states understanding of the risks of readmission and decline/delay in her recovery. She is confident she will be able to get her mask/machine within the next 1-2 days. She is agreeable to enroll in RPM program so we can closely monitor her O2 levels. This is not an ideal, but an acceptable alternative to keeping her hospitalized. Discussed with Dr Lagos who is in agreement for RPM enrollment.

## 2021-11-07 NOTE — PROGRESS NOTES
Patient discharged to home by vehicle with mother escorted by hospital staff in a wheelchair. Discharge teaching completed at bedside and patient signature obtained. Pt educated on home monitoring system. All questions and concerns addressed. Tele box and PIVx2 removed. Safety measures in place during transport.

## 2021-11-07 NOTE — PROGRESS NOTES
Assumed report and patient care from Holly FORD via report at the doorway r/t COVID-19 precautions. Patient is resting comfortably in bed with no signs of labored breathing or restlessness. POC discussed. No questions or concerns at this time. Safety measures in place, call light within reach.

## 2021-11-07 NOTE — PROGRESS NOTES
Report received from LOGAN Guillermo. Patient is A&O4. Patient ambulated to restroom and back to bed with RN hand held assist. Patient skin assessment completed. Answered all questions and concerns at this time. White board has been updated. Call light in reach and safety precautions in place. No needs at this time.      COVID 19 surge in effect

## 2021-11-07 NOTE — PROGRESS NOTES
Pulmonary Progress Note    Date of admission  10/31/2021    Chief Complaint  46 y.o. female admitted 10/31/2021 with hyponatremia and acute hypoxemic respiratory failure in the setting of COVID-19    Hospital Course  46 y.o. female admitted 10/31/2021 with HTN, morbid obesity, secondary polycythemia brought in by [facility] with worsening SOB. Rapid COVID test + yesterday and on 3L oxygen was saturating 72%. Pt was altered.    Labs in ER showed Na of 116, altered and hypoxic, cyanotic and in extreme distress  Extremely difficult intubation but pt finally intubated with 6.0 ET tube  Transferred to the ICU for further management - per Dr Juares admit H&P 10/31    Interval Problem Update  Chart review from the past 24 hours includes imaging, laboratory studies, vital signs and notes available.  Pertinent data for today's visit includes    Transferred to telemetry  No acute events overnight   Stable on BIPAP qhs/2L during day  Working with PT  Insisting to be discharged    Cardiac: SBP still up to 180s at times  Pulm: 2L, BIPAP 12/8 50% overnight, saturating low 90s.   Neuro: no focal deficits  Heme: chronic polycythemia stable, leukocytosis stable  I/O:  -4.4L, Cr< 1  ID:  WBC stable, afebrile, unasyn 11/4 for suspected R submandibular sialadenitis. COVID + 10/30. Decadron. S/p tocilizumab 11/1. Pancultured, NGTD.   GI/endo: modified diet by SLP tolerating well  Labs/Imaging: reviewed  Lines: triple lumen IJ removed, browne removed  Mobility: bedrest  Symptoms: AOx4, neck swelling stable    Review of Systems  Review of Systems   Constitutional: Negative for chills, fever and weight loss.   HENT: Positive for sore throat. Negative for congestion and sinus pain.    Eyes: Negative for pain and discharge.   Respiratory: Negative for cough, sputum production, shortness of breath, wheezing and stridor.    Cardiovascular: Negative for chest pain, orthopnea and leg swelling.   Gastrointestinal: Negative for abdominal pain, diarrhea,  nausea and vomiting.   Genitourinary: Negative for dysuria, frequency and urgency.   Musculoskeletal: Positive for back pain. Negative for myalgias.   Skin: Negative for rash.   Neurological: Negative for dizziness, sensory change, focal weakness, loss of consciousness and headaches.   Psychiatric/Behavioral: Negative.    All other systems reviewed and are negative.     Vital Signs for last 24 hours   Temp:  [36.2 °C (97.2 °F)-36.9 °C (98.5 °F)] 36.9 °C (98.5 °F)  Pulse:  [65-96] 72  Resp:  [15-90] 18  BP: (138-185)/() 185/112  SpO2:  [90 %-99 %] 91 %    Physical Exam   Physical Exam  Vitals and nursing note reviewed.   Constitutional:       General: She is not in acute distress.     Appearance: She is well-developed. She is obese. She is not ill-appearing or diaphoretic.   Eyes:      General: No scleral icterus.        Right eye: No discharge.         Left eye: No discharge.   Neck:      Thyroid: No thyromegaly.      Vascular: No JVD.      Comments: large submandibular bruising/swelling  Cardiovascular:      Rate and Rhythm: Regular rhythm. Tachycardia present.      Heart sounds: Normal heart sounds. No murmur heard.  No gallop.    Pulmonary:      Effort: No respiratory distress.      Breath sounds: Normal breath sounds. No wheezing or rales.      Comments: Distant bilateral breath sounds  Abdominal:      General: There is distension.      Palpations: Abdomen is soft.      Tenderness: There is no abdominal tenderness. There is no guarding.   Musculoskeletal:         General: No tenderness.      Cervical back: No rigidity.   Lymphadenopathy:      Cervical: No cervical adenopathy.   Skin:     General: Skin is warm.      Capillary Refill: Capillary refill takes less than 2 seconds.      Findings: No erythema or rash.   Neurological:      General: No focal deficit present.      Mental Status: She is alert and oriented to person, place, and time.      Cranial Nerves: No cranial nerve deficit.      Sensory: No  sensory deficit.      Motor: No abnormal muscle tone.   Psychiatric:         Behavior: Behavior normal.       Medications  Current Facility-Administered Medications   Medication Dose Route Frequency Provider Last Rate Last Admin   • cloNIDine (CATAPRES) tablet 0.2 mg  0.2 mg Oral TWICE DAILY Deniz Boyce M.D.   0.2 mg at 11/07/21 0541   • furosemide (LASIX) injection 20 mg  20 mg Intravenous BID DIURETIC Deniz Boyce M.D.   20 mg at 11/07/21 0538   • dexamethasone (DECADRON) injection 6 mg  6 mg Intravenous DAILY Deniz Boyce M.D.   6 mg at 11/07/21 0538   • hydrALAZINE (APRESOLINE) tablet 50 mg  50 mg Oral Q8HRS Deniz Boyce M.D.   50 mg at 11/07/21 0539   • hydrALAZINE (APRESOLINE) injection 20 mg  20 mg Intravenous Q6HRS PRN Deniz Boyce M.D.       • metoprolol tartrate (LOPRESSOR) tablet 200 mg  200 mg Oral TWICE DAILY Deniz Boyce M.D.   200 mg at 11/07/21 0541   • amLODIPine (NORVASC) tablet 10 mg  10 mg Oral Q DAY Deniz Boyce M.D.   10 mg at 11/07/21 0540   • senna-docusate (PERICOLACE or SENOKOT S) 8.6-50 MG per tablet 2 Tablet  2 Tablet Oral BID Deniz Boyce M.D.   2 Tablet at 11/04/21 1723    And   • polyethylene glycol/lytes (MIRALAX) PACKET 1 Packet  1 Packet Oral QDAY PRN Deniz Boyce M.D.        And   • magnesium hydroxide (MILK OF MAGNESIA) suspension 30 mL  30 mL Oral QDAY PRN Deniz Boyce M.D.        And   • bisacodyl (DULCOLAX) suppository 10 mg  10 mg Rectal QDAY PRN Deniz Boyce M.D.       • acetaminophen (Tylenol) tablet 650 mg  650 mg Oral Q6HRS PRN Deniz Boyce M.D.       • ondansetron (ZOFRAN ODT) dispertab 4 mg  4 mg Oral Q6HRS PRN Deniz Boyce M.D.       • lisinopril (PRINIVIL) tablet 20 mg  20 mg Oral TWICE DAILY Deniz Boyce M.D.   20 mg at 11/07/21 0541   • enalaprilat (Vasotec) injection 2.5 mg 2 mL  2.5 mg Intravenous Q6HRS PRN Deniz Boyce M.D.       • ampicillin/sulbactam (UNASYN) 3 g in  mL IVPB  3 g  Intravenous Q6HRS Deniz Boyce M.D.   Stopped at 11/07/21 0617   • budesonide-formoterol (SYMBICORT) 160-4.5 MCG/ACT inhaler 2 Puff  2 Puff Inhalation BID (RT) Deniz Boyce M.D.   2 Puff at 11/07/21 0630   • ondansetron (ZOFRAN) syringe/vial injection 4 mg  4 mg Intravenous Q6HRS PRN Alan Rios M.D.       • heparin injection 5,000 Units  5,000 Units Subcutaneous Q8HRS Alan Rios M.D.   5,000 Units at 11/07/21 0538   • insulin regular (HumuLIN R,NovoLIN R) injection  3-14 Units Subcutaneous Q6HRS Alan Rios M.D.   3 Units at 11/05/21 1134    And   • glucose 4 g chewable tablet 16 g  16 g Oral Q15 MIN PRN Alan Rios M.D.        And   • dextrose 50% (D50W) injection 50 mL  50 mL Intravenous Q15 MIN PRN Alan Rios M.D.       • Respiratory Therapy Consult   Nebulization Continuous RT Alan Rios M.D.       • MD Alert...ICU Electrolyte Replacement per Pharmacy   Other PHARMACY TO DOSE Alan Rios M.D.       • lidocaine (XYLOCAINE) 1 % injection 2 mL  2 mL Tracheal Tube Q30 MIN PRN Alan Rios M.D.         Fluids    Intake/Output Summary (Last 24 hours) at 11/7/2021 0857  Last data filed at 11/7/2021 0600  Gross per 24 hour   Intake 1590 ml   Output 1675 ml   Net -85 ml     Laboratory  Recent Labs     11/05/21  0310   ISTATAPH 7.441   ISTATAPCO2 53.5*   ISTATAPO2 72   ISTATATCO2 38*   [id number] 95   ISTATARTHCO3 36.4*   ISTATARTBE 9*   ISTATTEMP 96.4 F   ISTATFIO2 50   ISTATSPEC Arterial   ISTATAPHTC 7.459   [id number] 67         Recent Labs     11/05/21  0401 11/06/21  0417 11/07/21  0506   SODIUM 135 139 138   POTASSIUM 3.8 3.8 4.0   CHLORIDE 94* 96 96   CO2 33 31 34*   BUN 22 26* 31*   CREATININE 0.59 0.67 0.58   MAGNESIUM 2.3 2.1 1.8   PHOSPHORUS 4.3 4.6* 4.9*   CALCIUM 8.4 8.6 8.6     Recent Labs     11/05/21  0401 11/06/21  0417 11/07/21  0506   ALTSGPT 18 32 58*   ASTSGOT 23 40 61*   ALKPHOSPHAT 85 93 87   TBILIRUBIN  0.9 1.3 1.3   GLUCOSE 156* 129* 106*     Recent Labs     11/05/21  0401 11/06/21  0417 11/07/21  0506   WBC 12.7* 12.3*  --    NEUTSPOLYS 87.00* 79.70*  --    LYMPHOCYTES 6.50* 10.30*  --    MONOCYTES 5.70 9.40  --    EOSINOPHILS 0.00 0.10  --    BASOPHILS 0.20 0.20  --    ASTSGOT 23 40 61*   ALTSGPT 18 32 58*   ALKPHOSPHAT 85 93 87   TBILIRUBIN 0.9 1.3 1.3     Recent Labs     11/05/21  0401 11/06/21  0417   RBC 7.26* 7.20*   HEMOGLOBIN 16.2* 15.9   HEMATOCRIT 58.0* 57.5*   PLATELETCT 311 322     Imaging  CT:    Reviewed, tonsillar swelling suspect 2/2 trauma, unlikely abscess/neoplasm    Assessment/Plan    * Acute hypoxemic respiratory failure due to COVID-19 (HCC)  Assessment & Plan  Acute on chronic hypoxemic respiratory failure  Intubated 10/31/21, 6.0 ETT, challenging intubation  Self extubated 11/2 despite restraints and adequate sedation  Doing well on aerosol mask, cont to wean FiO2 to maintain sats low 90s  Cont decadron 6mg PO QD for total 10 days  s/p tocilimuzab 11/1/2021   Cont lasix 20mg IV BID  Refusing proning despite extensive education and counseling by RN, RT, MD  Cont PT/OT/SLP  Strict droplet, contact and  Eye protection    KIRIT (obstructive sleep apnea)- (present on admission)  Assessment & Plan  Pt with KIRIT/OHS  Pt has BIPAP approved through [facility]- PCP Lorna Vaughn. Plan was to get mask/machine prior to discharge, but patient is insisting to go home today which I advised against. She states understanding of the risks of readmission and decline/delay in her recovery. She is confident she will be able to get her mask/machine within the next 1-2 days. She is agreeable to enroll in RPM program so we can closely monitor her O2 levels. This is not an ideal, but an acceptable alternative to keeping her hospitalized. Discussed with Dr Lagos who is in agreement for RPM enrollment.    Sialadenitis- (present on admission)  Assessment & Plan  Suspect 2/2 trauma from difficult intubation on 10/31  Airway  patent, no stridor  Breathing comfortably  Passed swallow eval  CT reviewed, have reached out to ENT to review imaging to confirm no further needs  ----  Plan for conservative management, taper decadron to minimize swelling/inflammation  Closely monitor for signs/symptoms of airway compromise  Complete 7 day course of abx  Humidify supplemental O2  Cont modified diet    Polycythemia vera (HCC)- (present on admission)  Assessment & Plan  Likely secondary from nocturnal and maybe daytime hypoxemia  No indication for therapeutic phlebotomy     Morbid obesity with BMI of 50.0-59.9, adult (HCC)- (present on admission)  Assessment & Plan  Comorbidity complicating ventilator synchrony, mechanics  Also associated with severe disease in COVID     VTE:  Heparin  Ulcer: H2 Antagonist    I have performed a physical exam and reviewed and updated ROS and Plan today (11/7/2021). In review of yesterday's note (11/6/2021), there are no changes except as documented above.     This note was generated using voice recognition software which has a chance of producing errors of grammar and content.  I have made every reasonable attempt to find and correct any errors, but it should be expected that some may not be found prior to finalization of this note.  __________  Deniz Boyce MD  Pulmonary and Critical Care Medicine  [facility]

## 2021-11-07 NOTE — PROGRESS NOTES
4 Eyes Skin Assessment Completed by LOGAN Barrera and LOGAN Guillermo.    Head WDL  Ears WDL  Nose WDL  Mouth WDL  Neck Bruising swelling, dressing to left side where central line was  Breast/Chest WDL  Shoulder Blades WDL  Spine WDL  (R) Arm/Elbow/Hand Bruising  (L) Arm/Elbow/Hand Bruising  Abdomen Bruising  Groin WDL  Scrotum/Coccyx/Buttocks WDL  (R) Leg Bruising and Edema  (L) Leg Bruising and Edema  (R) Heel/Foot/Toe Bruising and Edema dryness  (L) Heel/Foot/Toe Bruising and Edema dryness           Devices In Places Tele Box and Nasal Cannula RAC and LAC PIV      Interventions In Place NC W/Ear Foams, Pillows and Pressure Redistribution Mattress    Possible Skin Injury No    Pictures Uploaded Into Epic N/A  Wound Consult Placed N/A  RN Wound Prevention Protocol Ordered No

## 2021-11-07 NOTE — DISCHARGE INSTRUCTIONS
Discharge Instructions    Discharged to home by car with relative. Discharged via wheelchair, hospital escort: Yes.  Special equipment needed: Oxygen    Be sure to schedule a follow-up appointment with your primary care doctor or any specialists as instructed.     Discharge Plan:   Diet Plan: Discussed  Activity Level: Discussed  Confirmed Follow up Appointment: Patient to Call and Schedule Appointment  Confirmed Symptoms Management: Discussed  Medication Reconciliation Updated: Yes  Influenza Vaccine Indication: Indicated: 9 to 64 years of age    I understand that a diet low in cholesterol, fat, and sodium is recommended for good health. Unless I have been given specific instructions below for another diet, I accept this instruction as my diet prescription.   Other diet: home diet    Special Instructions: None    · Is patient discharged on Warfarin / Coumadin?   No     Depression / Suicide Risk    As you are discharged from this [facility] facility, it is important to learn how to keep safe from harming yourself.    Recognize the warning signs:  · Abrupt changes in personality, positive or negative- including increase in energy   · Giving away possessions  · Change in eating patterns- significant weight changes-  positive or negative  · Change in sleeping patterns- unable to sleep or sleeping all the time   · Unwillingness or inability to communicate  · Depression  · Unusual sadness, discouragement and loneliness  · Talk of wanting to die  · Neglect of personal appearance   · Rebelliousness- reckless behavior  · Withdrawal from people/activities they love  · Confusion- inability to concentrate     If you or a loved one observes any of these behaviors or has concerns about self-harm, here's what you can do:  · Talk about it- your feelings and reasons for harming yourself  · Remove any means that you might use to hurt yourself (examples: pills, rope, extension cords, firearm)  · Get professional help from the  community (Mental Health, Substance Abuse, psychological counseling)  · Do not be alone:Call your Safe Contact- someone whom you trust who will be there for you.  · Call your local CRISIS HOTLINE 350-3985 or 228-637-3748  · Call your local Children's Mobile Crisis Response Team [place] (159) 139-0742 or www.[employer]  · Call the toll free National Suicide Prevention Hotlines   · National Suicide Prevention Lifeline 644-456-LQFD (4707)  · National Hope Line Network 800-SUICIDE (317-7711)    Amlodipine tablets  What is this medicine?  AMLODIPINE (am MEET di peen) is a calcium-channel blocker. It affects the amount of calcium found in your heart and muscle cells. This relaxes your blood vessels, which can reduce the amount of work the heart has to do. This medicine is used to lower high blood pressure. It is also used to prevent chest pain.  This medicine may be used for other purposes; ask your health care provider or pharmacist if you have questions.  COMMON BRAND NAME(S): Norvasc  What should I tell my health care provider before I take this medicine?  They need to know if you have any of these conditions:  · heart disease  · liver disease  · an unusual or allergic reaction to amlodipine, other medicines, foods, dyes, or preservatives  · pregnant or trying to get pregnant  · breast-feeding  How should I use this medicine?  Take this medicine by mouth with a glass of water. Follow the directions on the prescription label. You can take it with or without food. If it upsets your stomach, take it with food. Take your medicine at regular intervals. Do not take it more often than directed. Do not stop taking except on your doctor's advice.  Talk to your pediatrician regarding the use of this medicine in children. While this drug may be prescribed for children as young as 6 years for selected conditions, precautions do apply.  Patients over 65 years of age may have a stronger reaction and need a smaller  dose.  Overdosage: If you think you have taken too much of this medicine contact a poison control center or emergency room at once.  NOTE: This medicine is only for you. Do not share this medicine with others.  What if I miss a dose?  If you miss a dose, take it as soon as you can. If it is almost time for your next dose, take only that dose. Do not take double or extra doses.  What may interact with this medicine?  Do not take this medicine with any of the following medications:  · tranylcypromine  This medicine may also interact with the following medications:  · clarithromycin  · cyclosporine  · diltiazem  · itraconazole  · simvastatin  · tacrolimus  This list may not describe all possible interactions. Give your health care provider a list of all the medicines, herbs, non-prescription drugs, or dietary supplements you use. Also tell them if you smoke, drink alcohol, or use illegal drugs. Some items may interact with your medicine.  What should I watch for while using this medicine?  Visit your healthcare professional for regular checks on your progress. Check your blood pressure as directed. Ask your healthcare professional what your blood pressure should be and when you should contact him or her.  Do not treat yourself for coughs, colds, or pain while you are using this medicine without asking your healthcare professional for advice. Some medicines may increase your blood pressure.  You may get dizzy. Do not drive, use machinery, or do anything that needs mental alertness until you know how this medicine affects you. Do not stand or sit up quickly, especially if you are an older patient. This reduces the risk of dizzy or fainting spells. Avoid alcoholic drinks; they can make you dizzier.  What side effects may I notice from receiving this medicine?  Side effects that you should report to your doctor or health care professional as soon as possible:  · allergic reactions like skin rash, itching or hives; swelling  of the face, lips, or tongue  · fast, irregular heartbeat  · signs and symptoms of low blood pressure like dizziness; feeling faint or lightheaded, falls; unusually weak or tired  · swelling of ankles, feet, hands  Side effects that usually do not require medical attention (report these to your doctor or health care professional if they continue or are bothersome):  · dry mouth  · facial flushing  · headache  · stomach pain  · tiredness  This list may not describe all possible side effects. Call your doctor for medical advice about side effects. You may report side effects to FDA at 3-969-WLG-7428.  Where should I keep my medicine?  Keep out of the reach of children.  Store at room temperature between 59 and 86 degrees F (15 and 30 degrees C).  Throw away any unused medicine after the expiration date.  NOTE: This sheet is a summary. It may not cover all possible information. If you have questions about this medicine, talk to your doctor, pharmacist, or health care provider.  © 2020 Elsevier/Gold Standard (2019-07-12 15:07:10)    Amoxicillin; Clavulanic Acid tablets  What is this medicine?  AMOXICILLIN; CLAVULANIC ACID (a mox i JUSTO in; ALAN novoa ic AS id) is a penicillin antibiotic. It is used to treat certain kinds of bacterial infections. It will not work for colds, flu, or other viral infections.  This medicine may be used for other purposes; ask your health care provider or pharmacist if you have questions.  COMMON BRAND NAME(S): Augmentin  What should I tell my health care provider before I take this medicine?  They need to know if you have any of these conditions:  · bowel disease, like colitis  · kidney disease  · liver disease  · mononucleosis  · an unusual or allergic reaction to amoxicillin, penicillin, cephalosporin, other antibiotics, clavulanic acid, other medicines, foods, dyes, or preservatives  · pregnant or trying to get pregnant  · breast-feeding  How should I use this medicine?  Take this  medicine by mouth with a full glass of water. Follow the directions on the prescription label. Take at the start of a meal. Do not crush or chew. If the tablet has a score line, you may cut it in half at the score line for easier swallowing. Take your medicine at regular intervals. Do not take your medicine more often than directed. Take all of your medicine as directed even if you think you are better. Do not skip doses or stop your medicine early.  Talk to your pediatrician regarding the use of this medicine in children. Special care may be needed.  Overdosage: If you think you have taken too much of this medicine contact a poison control center or emergency room at once.  NOTE: This medicine is only for you. Do not share this medicine with others.  What if I miss a dose?  If you miss a dose, take it as soon as you can. If it is almost time for your next dose, take only that dose. Do not take double or extra doses.  What may interact with this medicine?  · allopurinol  · anticoagulants  · birth control pills  · methotrexate  · probenecid  This list may not describe all possible interactions. Give your health care provider a list of all the medicines, herbs, non-prescription drugs, or dietary supplements you use. Also tell them if you smoke, drink alcohol, or use illegal drugs. Some items may interact with your medicine.  What should I watch for while using this medicine?  Tell your doctor or healthcare provider if your symptoms do not improve.  This medicine may cause serious skin reactions. They can happen weeks to months after starting the medicine. Contact your healthcare provider right away if you notice fevers or flu-like symptoms with a rash. The rash may be red or purple and then turn into blisters or peeling of the skin. Or, you might notice a red rash with swelling of the face, lips or lymph nodes in your neck or under your arms.  Do not treat diarrhea with over the counter products. Contact your doctor  if you have diarrhea that lasts more than 2 days or if it is severe and watery.  If you have diabetes, you may get a false-positive result for sugar in your urine. Check with your doctor or healthcare provider.  Birth control pills may not work properly while you are taking this medicine. Talk to your doctor about using an extra method of birth control.  What side effects may I notice from receiving this medicine?  Side effects that you should report to your doctor or health care professional as soon as possible:  · allergic reactions like skin rash, itching or hives, swelling of the face, lips, or tongue  · breathing problems  · dark urine  · fever or chills, sore throat  · redness, blistering, peeling, or loosening of the skin, including inside the mouth  · seizures  · trouble passing urine or change in the amount of urine  · unusual bleeding, bruising  · unusually weak or tired  · white patches or sores in the mouth or throat  Side effects that usually do not require medical attention (report to your doctor or health care professional if they continue or are bothersome):  · diarrhea  · dizziness  · headache  · nausea, vomiting  · stomach upset  · vaginal or anal irritation  This list may not describe all possible side effects. Call your doctor for medical advice about side effects. You may report side effects to FDA at 0-874-FDA-2952.  Where should I keep my medicine?  Keep out of the reach of children.  Store at room temperature below 25 degrees C (77 degrees F). Keep container tightly closed. Throw away any unused medicine after the expiration date.  NOTE: This sheet is a summary. It may not cover all possible information. If you have questions about this medicine, talk to your doctor, pharmacist, or health care provider.  © 2020 Elsevier/Gold Standard (2020-03-02 09:43:46)    Budesonide; Formoterol Inhalation  What is this medicine?  BUDESONIDE; FORMOTEROL (byoo DARIEL yeh; for YANCI martinez) inhalation is a  combination of 2 medicines that decrease inflammation and help to open up the airways in your lungs. It is used to treat asthma. It is also used to treat chronic obstructive pulmonary disease (COPD), including chronic bronchitis or emphysema. Do NOT use for an acute asthma or COPD attack.  This medicine may be used for other purposes; ask your health care provider or pharmacist if you have questions.  COMMON BRAND NAME(S): Symbicort  What should I tell my health care provider before I take this medicine?  They need to know if you have any of these conditions:  · bone problems  · diabetes  · heart disease or irregular heartbeat  · high blood pressure  · immune system problems  · infection  · liver disease  · worsening asthma  · an unusual or allergic reaction to budesonide, formoterol, medicines, foods, dyes, or preservatives  · pregnant or trying to get pregnant  · breast-feeding  How should I use this medicine?  This medicine is inhaled through the mouth. Rinse your mouth with water after use. Make sure not to swallow the water. Follow the directions on your prescription label. Do not use more often than directed. Do not stop taking except on your doctor's advice. Make sure that you are using your inhaler correctly. Ask your doctor or health care provider if you have any questions.  A special MedGuide will be given to you by the pharmacist with each prescription and refill. Be sure to read this information carefully each time.  Talk to your pediatrician regarding the use of this medicine in children. While this drug may be prescribed for children as young as 6 years of age for selected conditions, precautions do apply.  Overdosage: If you think you have taken too much of this medicine contact a poison control center or emergency room at once.  NOTE: This medicine is only for you. Do not share this medicine with others.  What if I miss a dose?  If you miss a dose, use it as soon as you remember. If it is almost time  for your next dose, use only that dose and continue with your regular schedule, spacing doses evenly. Do not use double or extra doses.  What may interact with this medicine?  Do not take this medicine with any of the following medications:  · MAOIs like Carbex, Eldepryl, Marplan, Nardil, and Parnate  · mifepristone  · probucol  · procarbazine  · some other medicines for asthma like formoterol, salmeterol  This medicine may also interact with the following medications:  · antibiotics like clarithromycin, erythromycin  · cimetidine  · diuretics  · grapefruit juice  · itraconazole  · ketoconazole  · medicines for depression, anxiety, or psychotic disturbances  · medicines for irregular heartbeat  · methadone  · some heart medicines like atenolol, metoprolol  · some other medicines for breathing problems  · some vaccines  This list may not describe all possible interactions. Give your health care provider a list of all the medicines, herbs, non-prescription drugs, or dietary supplements you use. Also tell them if you smoke, drink alcohol, or use illegal drugs. Some items may interact with your medicine.  What should I watch for while using this medicine?  Tell your doctor or health care professional if your symptoms do not improve or get worse. Do not use this medicine more than every 12 hours.  NEVER use this medicine for an acute asthma or COPD attack. You should use your short-acting rescue inhalers for this purpose. If your symptoms get worse or if you need your short-acting inhalers more often, call your doctor right away.  This medicine may increase your risk of getting an infection. Tell your doctor or health care professional if you are around anyone with measles or chickenpox, or if you develop sores or blisters that do not heal properly.  What side effects may I notice from receiving this medicine?  Side effects that you should report to your doctor or health care professional as soon as possible:  · allergic  reactions such as skin rash or itching, hives, swelling of the face, lips or tongue  · breathing problems  · changes in vision  · chest pain  · fast, irregular heartbeat  · feeling faint or lightheaded, falls  · fever  · high blood pressure  · nervousness  · tremors  · white patches or sores in mouth  Side effects that usually do not require medical attention (report to your doctor or health care professional if they continue or are bothersome):  · cough  · different taste in mouth  · headache  · sore throat  · stuffy nose  · stomach upset  This list may not describe all possible side effects. Call your doctor for medical advice about side effects. You may report side effects to FDA at 4-758-HQS-1208.  Where should I keep my medicine?  Keep out of the reach of children.  Store in a dry place at room temperature between 20 and 25 degrees C (68 and 77 degrees F). Do not get the inhaler wet. Keep track of the number of doses used. Throw away the inhaler after using the marked number of inhalations or after the expiration date, whichever comes first. Do not burn or puncture canister.  NOTE: This sheet is a summary. It may not cover all possible information. If you have questions about this medicine, talk to your doctor, pharmacist, or health care provider.  © 2020 Elsevier/Gold Standard (2017-12-21 15:25:18)    Carvedilol tablets  What is this medicine?  CARVEDILOL (LORI ve dil ol) is a beta-blocker. Beta-blockers reduce the workload on the heart and help it to beat more regularly. This medicine is used to treat high blood pressure and heart failure.  This medicine may be used for other purposes; ask your health care provider or pharmacist if you have questions.  COMMON BRAND NAME(S): Coreg  What should I tell my health care provider before I take this medicine?  They need to know if you have any of these conditions:  · circulation problems  · diabetes  · history of heart attack or heart disease  · liver disease  · lung  or breathing disease, like asthma or emphysema  · pheochromocytoma  · slow or irregular heartbeat  · thyroid disease  · an unusual or allergic reaction to carvedilol, other beta-blockers, medicines, foods, dyes, or preservatives  · pregnant or trying to get pregnant  · breast-feeding  How should I use this medicine?  Take this medicine by mouth with a glass of water. Follow the directions on the prescription label. It is best to take the tablets with food. Take your doses at regular intervals. Do not take your medicine more often than directed. Do not stop taking except on the advice of your doctor or health care professional.  Talk to your pediatrician regarding the use of this medicine in children. Special care may be needed.  Overdosage: If you think you have taken too much of this medicine contact a poison control center or emergency room at once.  NOTE: This medicine is only for you. Do not share this medicine with others.  What if I miss a dose?  If you miss a dose, take it as soon as you can. If it is almost time for your next dose, take only that dose. Do not take double or extra doses.  What may interact with this medicine?  This medicine may interact with the following medications:  · certain medicines for blood pressure, heart disease, irregular heart beat  · certain medicines for depression, like fluoxetine or paroxetine  · certain medicines for diabetes, like glipizide or glyburide  · cimetidine  · clonidine  · cyclosporine  · digoxin  · MAOIs like Carbex, Eldepryl, Marplan, Nardil, and Parnate  · reserpine  · rifampin  This list may not describe all possible interactions. Give your health care provider a list of all the medicines, herbs, non-prescription drugs, or dietary supplements you use. Also tell them if you smoke, drink alcohol, or use illegal drugs. Some items may interact with your medicine.  What should I watch for while using this medicine?  Check your heart rate and blood pressure regularly  while you are taking this medicine. Ask your doctor or health care professional what your heart rate and blood pressure should be, and when you should contact him or her. Do not stop taking this medicine suddenly. This could lead to serious heart-related effects.  Contact your doctor or health care professional if you have difficulty breathing while taking this drug.  Check your weight daily. Ask your doctor or health care professional when you should notify him/her of any weight gain.  You may get drowsy or dizzy. Do not drive, use machinery, or do anything that requires mental alertness until you know how this medicine affects you. To reduce the risk of dizzy or fainting spells, do not sit or stand up quickly. Alcohol can make you more drowsy, and increase flushing and rapid heartbeats. Avoid alcoholic drinks.  This medicine may increase blood sugar. Ask your healthcare provider if changes in diet or medicines are needed if you have diabetes.  If you are going to have surgery, tell your doctor or health care professional that you are taking this medicine.  What side effects may I notice from receiving this medicine?  Side effects that you should report to your doctor or health care professional as soon as possible:  · allergic reactions like skin rash, itching or hives, swelling of the face, lips, or tongue  · breathing problems  · dark urine  · irregular heartbeat  ·   signs and symptoms of high blood sugar such as being more thirsty or hungry or having to urinate more than normal. You may also feel very tired or have blurry vision.  · swollen legs or ankles  · vomiting  · yellowing of the eyes or skin  Side effects that usually do not require medical attention (report to your doctor or health care professional if they continue or are bothersome):  · change in sex drive or performance  · diarrhea  · dry eyes (especially if wearing contact lenses)  · dry, itching skin  · headache  · nausea  · unusually tired  This  list may not describe all possible side effects. Call your doctor for medical advice about side effects. You may report side effects to FDA at 7-987-HZY-1016.  Where should I keep my medicine?  Keep out of the reach of children.  Store at room temperature below 30 degrees C (86 degrees F). Protect from moisture. Keep container tightly closed. Throw away any unused medicine after the expiration date.  NOTE: This sheet is a summary. It may not cover all possible information. If you have questions about this medicine, talk to your doctor, pharmacist, or health care provider.  © 2020 Elsevier/Gold Standard (2019-10-09 09:13:57)    Dexamethasone tablets  What is this medicine?  DEXAMETHASONE (dex a METH a sone) is a corticosteroid. It is commonly used to treat inflammation of the skin, joints, lungs, and other organs. Common conditions treated include asthma, allergies, and arthritis. It is also used for other conditions, such as blood disorders and diseases of the adrenal glands.  This medicine may be used for other purposes; ask your health care provider or pharmacist if you have questions.  COMMON BRAND NAME(S): CUSHINGS SYNDROME DIAGNOSTIC, Decadron, Dexabliss, DexPak Jr TaperPak, DexPak TaperPak, Dxevo, HiDex, TaperDex, Zema-James, ZoDex, ZonaCort 11 Day, ZonaCort 7 Day  What should I tell my health care provider before I take this medicine?  They need to know if you have any of these conditions:  · Cushing's syndrome  · diabetes  · glaucoma  · heart problems or disease  · high blood pressure  · infection like herpes, measles, tuberculosis, or chickenpox  · kidney disease  · liver disease  · mental problems  · myasthenia gravis  · osteoporosis  · previous heart attack  · seizures  · stomach, ulcer or intestine disease including colitis and diverticulitis  · thyroid problem  · an unusual or allergic reaction to dexamethasone, corticosteroids, other medicines, lactose, foods, dyes, or preservatives  · pregnant or trying  to get pregnant  · breast-feeding  How should I use this medicine?  Take this medicine by mouth with a drink of water. Follow the directions on the prescription label. Take it with food or milk to avoid stomach upset. If you are taking this medicine once a day, take it in the morning. Do not take more medicine than you are told to take. Do not suddenly stop taking your medicine because you may develop a severe reaction. Your doctor will tell you how much medicine to take. If your doctor wants you to stop the medicine, the dose may be slowly lowered over time to avoid any side effects.  Talk to your pediatrician regarding the use of this medicine in children. Special care may be needed.  Patients over 65 years old may have a stronger reaction and need a smaller dose.  Overdosage: If you think you have taken too much of this medicine contact a poison control center or emergency room at once.  NOTE: This medicine is only for you. Do not share this medicine with others.  What if I miss a dose?  If you miss a dose, take it as soon as you can. If it is almost time for your next dose, talk to your doctor or health care professional. You may need to miss a dose or take an extra dose. Do not take double or extra doses without advice.  What may interact with this medicine?  Do not take this medicine with any of the following medications:  · mifepristone, RU-486  · vaccines  This medicine may also interact with the following medications:  · amphotericin B  · antibiotics like clarithromycin, erythromycin, and troleandomycin  · aspirin and aspirin-like drugs  · barbiturates like phenobarbital  · carbamazepine  · cholestyramine  · cholinesterase inhibitors like donepezil, galantamine, rivastigmine, and tacrine  · cyclosporine  · digoxin  · diuretics  · ephedrine  · female hormones, like estrogens or progestins and birth control pills  · indinavir  · isoniazid  · ketoconazole  · medicines for diabetes  · medicines that improve  muscle tone or strength for conditions like myasthenia gravis  · NSAIDs, medicines for pain and inflammation, like ibuprofen or naproxen  · phenytoin  · rifampin  · thalidomide  · warfarin  This list may not describe all possible interactions. Give your health care provider a list of all the medicines, herbs, non-prescription drugs, or dietary supplements you use. Also tell them if you smoke, drink alcohol, or use illegal drugs. Some items may interact with your medicine.  What should I watch for while using this medicine?  Visit your doctor or health care professional for regular checks on your progress. If you are taking this medicine over a prolonged period, carry an identification card with your name and address, the type and dose of your medicine, and your doctor's name and address.  This medicine may increase your risk of getting an infection. Stay away from people who are sick. Tell your doctor or health care professional if you are around anyone with measles or chickenpox.  If you are going to have surgery, tell your doctor or health care professional that you have taken this medicine within the last twelve months.  Ask your doctor or health care professional about your diet. You may need to lower the amount of salt you eat.  This medicine may increase blood sugar. Ask your healthcare provider if changes in diet or medicines are needed if you have diabetes.  What side effects may I notice from receiving this medicine?  Side effects that you should report to your doctor or health care professional as soon as possible:  · allergic reactions like skin rash, itching or hives, swelling of the face, lips, or tongue  · fever, sore throat, sneezing, cough, or other signs of infection, wounds that will not heal  · mental depression, mood swings, mistaken feelings of self importance or of being mistreated  · pain in hips, back, ribs, arms, shoulders, or legs  · redness, blistering, peeling or loosening of the skin,  including inside the mouth  ·   signs and symptoms of high blood sugar such as being more thirsty or hungry or having to urinate more than normal. You may also feel very tired or have blurry vision.  · trouble passing urine  · swelling of feet or lower legs  · unusual bleeding or bruising  Side effects that usually do not require medical attention (report to your doctor or health care professional if they continue or are bothersome):  · headache  · nausea, vomiting  · skin problems, acne, thin and shiny skin  · weight gain  This list may not describe all possible side effects. Call your doctor for medical advice about side effects. You may report side effects to FDA at 4-501-BBD-0072.  Where should I keep my medicine?  Keep out of the reach of children.  Store at room temperature between 20 and 25 degrees C (68 and 77 degrees F). Protect from light. Throw away any unused medicine after the expiration date.  NOTE: This sheet is a summary. It may not cover all possible information. If you have questions about this medicine, talk to your doctor, pharmacist, or health care provider.  © 2020 Elsevier/Gold Standard (2019-09-18 10:58:53)    Furosemide tablets  What is this medicine?  FUROSEMIDE (fyoor [place] se mide) is a diuretic. It helps you make more urine and to lose salt and excess water from your body. This medicine is used to treat high blood pressure, and edema or swelling from heart, kidney, or liver disease.  This medicine may be used for other purposes; ask your health care provider or pharmacist if you have questions.  COMMON BRAND NAME(S): Active-Medicated Specimen Kit, Delone, Diuscreen, Lasix, RX Specimen Collection Kit, Specimen Collection Kit, URINX Medicated Specimen Collection  What should I tell my health care provider before I take this medicine?  They need to know if you have any of these conditions:  · abnormal blood electrolytes  · diarrhea or vomiting  · gout  · heart disease  · kidney disease, small  amounts of urine, or difficulty passing urine  · liver disease  · thyroid disease  · an unusual or allergic reaction to furosemide, sulfa drugs, other medicines, foods, dyes, or preservatives  · pregnant or trying to get pregnant  · breast-feeding  How should I use this medicine?  Take this medicine by mouth with a glass of water. Follow the directions on the prescription label. You may take this medicine with or without food. If it upsets your stomach, take it with food or milk. Do not take your medicine more often than directed. Remember that you will need to pass more urine after taking this medicine. Do not take your medicine at a time of day that will cause you problems. Do not take at bedtime.  Talk to your pediatrician regarding the use of this medicine in children. While this drug may be prescribed for selected conditions, precautions do apply.  Overdosage: If you think you have taken too much of this medicine contact a poison control center or emergency room at once.  NOTE: This medicine is only for you. Do not share this medicine with others.  What if I miss a dose?  If you miss a dose, take it as soon as you can. If it is almost time for your next dose, take only that dose. Do not take double or extra doses.  What may interact with this medicine?  · aspirin and aspirin-like medicines  · certain antibiotics  · chloral hydrate  · cisplatin  · cyclosporine  · digoxin  · diuretics  · laxatives  · lithium  · medicines for blood pressure  · medicines that relax muscles for surgery  · methotrexate  · NSAIDs, medicines for pain and inflammation like ibuprofen, naproxen, or indomethacin  · phenytoin  · steroid medicines like prednisone or cortisone  · sucralfate  · thyroid hormones  This list may not describe all possible interactions. Give your health care provider a list of all the medicines, herbs, non-prescription drugs, or dietary supplements you use. Also tell them if you smoke, drink alcohol, or use illegal  drugs. Some items may interact with your medicine.  What should I watch for while using this medicine?  Visit your doctor or health care provider for regular checks on your progress. Check your blood pressure regularly. Ask your doctor or health care provider what your blood pressure should be, and when you should contact him or her. If you are a diabetic, check your blood sugar as directed.  This medicine may cause serious skin reactions. They can happen weeks to months after starting the medicine. Contact your health care provider right away if you notice fevers or flu-like symptoms with a rash. The rash may be red or purple and then turn into blisters or peeling of the skin. Or, you might notice a red rash with swelling of the face, lips or lymph nodes in your neck or under your arms.  You may need to be on a special diet while taking this medicine. Check with your doctor. Also, ask how many glasses of fluid you need to drink a day. You must not get dehydrated.  You may get drowsy or dizzy. Do not drive, use machinery, or do anything that needs mental alertness until you know how this drug affects you. Do not stand or sit up quickly, especially if you are an older patient. This reduces the risk of dizzy or fainting spells. Alcohol can make you more drowsy and dizzy. Avoid alcoholic drinks.  This medicine can make you more sensitive to the sun. Keep out of the sun. If you cannot avoid being in the sun, wear protective clothing and use sunscreen. Do not use sun lamps or tanning beds/booths.  What side effects may I notice from receiving this medicine?  Side effects that you should report to your doctor or health care professional as soon as possible:  · blood in urine or stools  · dry mouth  · fever or chills  · hearing loss or ringing in the ears  · irregular heartbeat  · muscle pain or weakness, cramps  · rash, fever, and swollen lymph nodes  · redness, blistering, peeling or loosening of the skin, including  inside the mouth  · skin rash  · stomach upset, pain, or nausea  · tingling or numbness in the hands or feet  · unusually weak or tired  · vomiting or diarrhea  · yellowing of the eyes or skin  Side effects that usually do not require medical attention (report to your doctor or health care professional if they continue or are bothersome):  · headache  · loss of appetite  · unusual bleeding or bruising  This list may not describe all possible side effects. Call your doctor for medical advice about side effects. You may report side effects to FDA at 9-985-BDX-0548.  Where should I keep my medicine?  Keep out of the reach of children.  Store at room temperature between 15 and 30 degrees C (59 and 86 degrees F). Protect from light. Throw away any unused medicine after the expiration date.  NOTE: This sheet is a summary. It may not cover all possible information. If you have questions about this medicine, talk to your doctor, pharmacist, or health care provider.  © 2020 Elsevier/Gold Standard (2020-03-20 14:04:13)

## 2021-11-07 NOTE — RESPIRATORY CARE
REMOTE MONITORING PROGRAM by RESPIRATORY THERAPY  11/7/2021 at 12:13 PM by Lety Qureshi, RRT     Patient interviewed by RT. Patient agrees to Remote Monitoring program. Device instruction performed with welcome packet, consent signed and placed at bedside chart. Patient instructed on how to use MyChart and Zoom. No questions at this time.

## 2021-11-07 NOTE — PROGRESS NOTES
Patient to ER due to elevated blood pressures. Patient recently went through some medication changes. Patient was taking valsartan-hydrochlorothiazide and then switched to a different medication names olmesartan. He is now switched back to valsartan but without the hydrochlorothiazide. Patient states his BP was perfect two weeks ago, at that time patients sodium was low and that's why they switched her medications around. Telemetry Shift Summary     RHYTHM: SR/SB  HR RANGE: 50-75 HX: down to 40  ECTOPY: f-o PVC, r PAC, r trig  MEASUREMENTS: 0.20/0.08/0.44    Normal Measurements  Rhythm: SR  HR RANGE:   Measurements: 0.12-0.20/0.06-0.10/0.30-0.52      Tele strips reviewed and placed in chart

## 2021-11-07 NOTE — CARE PLAN
The patient is Stable - Low risk of patient condition declining or worsening    Shift Goals  Clinical Goals: monitor oxygen saturation  Patient Goals: comfort and rest  Family Goals: support patient     Progress made toward(s) clinical / shift goals:        Problem: Knowledge Deficit - Standard  Goal: Patient and family/care givers will demonstrate understanding of plan of care, disease process/condition, diagnostic tests and medications  Outcome: Progressing     Problem: Fall Risk  Goal: Patient will remain free from falls  Outcome: Progressing     Problem: Communication  Goal: The ability to communicate needs accurately and effectively will improve  Outcome: Progressing     Problem: Mobility  Goal: Patient's capacity to carry out activities will improve  Outcome: Progressing     Problem: Self Care  Goal: Patient will have the ability to perform ADLs independently or with assistance (bathe, groom, dress, toilet and feed)  Outcome: Progressing

## 2021-11-08 ENCOUNTER — TELEPHONE (OUTPATIENT)
Dept: OTHER | Facility: MEDICAL CENTER | Age: 46
End: 2021-11-08

## 2021-11-08 VITALS — OXYGEN SATURATION: 92 % | RESPIRATION RATE: 15 BRPM | HEART RATE: 84 BPM

## 2021-11-08 NOTE — TELEPHONE ENCOUNTER
Called patient, she is doing well. She is going to reset her phone to try and fix the disconnect.

## 2021-11-08 NOTE — TELEPHONE ENCOUNTER
Contacted patient due to alert for SpO2 87% at 0817. Pt is feeling well just walked a bit. Her SpO2 went back up at 92% at 0822 once she sat back down. Pt is on 2L of O2

## 2021-11-10 LAB
BACTERIA BLD CULT: NORMAL
BACTERIA BLD CULT: NORMAL
SIGNIFICANT IND 70042: NORMAL
SIGNIFICANT IND 70042: NORMAL
SITE SITE: NORMAL
SITE SITE: NORMAL
SOURCE SOURCE: NORMAL
SOURCE SOURCE: NORMAL

## 2021-11-11 ENCOUNTER — TELEPHONE (OUTPATIENT)
Dept: OTHER | Facility: MEDICAL CENTER | Age: 46
End: 2021-11-11

## 2021-11-11 VITALS — HEART RATE: 66 BPM | OXYGEN SATURATION: 94 % | RESPIRATION RATE: 18 BRPM

## 2021-11-13 NOTE — DOCUMENTATION QUERY
Formerly [facility]                                                                       Query Response Note      PATIENT:               DAMIEN VALENCIA  ACCT #:                  [id number]  MRN:                     [id number]  :                      1975  ADMIT DATE:       10/31/2021 4:56 PM  DISCH DATE:        2021 1:06 PM  RESPONDING  PROVIDER #:        [id number]           QUERY TEXT:    Hypotension is documented on 10/31 as due to medications - propofol and fentanyl.  Septic Shock is not documented until .     It is unclear whether Septic Shock was present on admission.    Your help is needed.  Please clarify the POA status.     NOTE:  If an appropriate response is not listed below, please respond with a new note.        The patient's Clinical Indicators include:  Sepsis protocol initiated in ED  and patient given IV fluids based on sepsis presentation on 10/31/21 according to ED Report.     H&P 10/31/21 Dr. Rios  Assessment/Plan:  Arterial hypotension- (present on admission)  Assessment & Plan  Due to medications - propofol and fentanyl  Started levophed    21 Progress Notes Dr. CRUZ Boyce  Transferred to the ICU for further management - per Dr Juares admit H&P 10/31  Vitals  Vital Signs for last 24 hours   Temp: 37.2 °C (99 °F)  Pulse:  61  Resp:  19  BP: ()/(31-98) 117/63  SpO2:  [88 %-97 %] 93 %    Septic shock (HCC)  Assessment & Plan  Presentation includes: Severe sepsis present and persistent hypotension after 30 ml/kg completed.   Septic shock 2/2 covid  TTE 10/18: mild pHTN, EF 65%  Levophed titrate to MAP > 65  Add precedex, wean propofol    Discharge Summary 21 Dr. POLLY Lagos  Patient was admitted to the ICU and treated with Tocilizumab and dexamethasone.  Patient was also in septic shock requiring placement of central line and IV vasopressors as well as tube feedings.      Treatment: Levophed (started  10/31), intubation, precedex    Risk Factors: Acute on Chronic Respiratory Failure, COVID 19 PNA      Huma Senior  Coding   [email or web address]  Options provided:   -- The condition of Septic Shock was present at the time of inpatient admission   -- The condition of Septic Shock was not present at the time of inpatient admission   -- The documentation is insufficient to determine if condition of Septic Shock is present on admission   -- The provider is unable to clinically determine whether condition of Septic Shock was present on admission or not      Query created by: Huma Senior on 11/11/2021 6:25 PM    RESPONSE TEXT:    The condition of Septic Shock was not present at the time of inpatient admission          Electronically signed by:  SHANNAN LOZA MD 11/13/2021 12:54 PM

## 2021-11-14 ENCOUNTER — TELEPHONE (OUTPATIENT)
Dept: OTHER | Facility: MEDICAL CENTER | Age: 46
End: 2021-11-14

## 2021-11-14 VITALS — HEART RATE: 87 BPM | RESPIRATION RATE: 14 BRPM | OXYGEN SATURATION: 84 %

## 2021-11-14 VITALS — HEART RATE: 88 BPM | OXYGEN SATURATION: 84 % | RESPIRATION RATE: 13 BRPM

## 2021-11-15 ENCOUNTER — TELEPHONE (OUTPATIENT)
Dept: OTHER | Facility: MEDICAL CENTER | Age: 46
End: 2021-11-15

## 2021-11-16 ENCOUNTER — PATIENT MESSAGE (OUTPATIENT)
Dept: SLEEP MEDICINE | Facility: MEDICAL CENTER | Age: 46
End: 2021-11-16

## 2021-11-17 ENCOUNTER — TELEPHONE (OUTPATIENT)
Dept: OTHER | Facility: MEDICAL CENTER | Age: 46
End: 2021-11-17

## 2021-11-17 VITALS — RESPIRATION RATE: 17 BRPM | HEART RATE: 83 BPM | OXYGEN SATURATION: 96 %

## 2021-11-17 VITALS — HEART RATE: 71 BPM | OXYGEN SATURATION: 81 % | RESPIRATION RATE: 16 BRPM

## 2021-11-17 VITALS — RESPIRATION RATE: 15 BRPM | OXYGEN SATURATION: 82 % | HEART RATE: 77 BPM

## 2021-11-17 NOTE — TELEPHONE ENCOUNTER
Patient called me back, she is doing well. Her canula came off while she was sleeping and her phone and slipped down into the couch so she did not here it. She put her oxygen back on and her sat came right back up.

## 2021-11-17 NOTE — DOCUMENTATION QUERY
[facility]                                                                       Query Response Note      PATIENT:               DAMIEN VALENCIA  ACCT #:                  [id number]  MRN:                     [id number]  :                      1975  ADMIT DATE:       10/31/2021 4:56 PM  DISCH DATE:        2021 1:06 PM  RESPONDING  PROVIDER #:        [id number]           QUERY TEXT:        Septic Shock was determined to not be present on admission per previous query response, prompting a question on Sepsis POA status as well.     It is unclear whether Sepsis was present on admission.  Your help is needed.  Please clarify the POA status.     NOTE:  If an appropriate response is not listed below, please respond with a new note.        The patient's Clinical Indicators include:  Sepsis in the form of Septic Shock was not documented untill , however  sepsis was noted in the ED report.    Sepsis protocol initiated in ED  and patient given IV fluids based on sepsis presentation on 10/31/21 according to ED Report.     Sepsis not documented in 10/31 Dr. Rios's H & P.  H & P Vitals  Temp:  36.4 °C (97.5 °F)  Pulse:  73  Resp:  19  BP:  88/55  SpO2:  95 %  Blood Pressure: (!) 191/98  Temperature: 36.4 °C (97.5 °F)  Pulse: (!) 126  Respiration: (!) 154  Pulse Oximetry: 94 %      21 Progress Notes Dr. CRUZ Boyce  Transferred to the ICU for further management - per Dr Juares admit H&P 10/31  Vitals  Vital Signs for last 24 hours   Temp: 37.2 °C (99 °F)  Pulse:  61  Resp:  19  BP: ()/(31-98) 117/63  SpO2:  [88 %-97 %] 93 %     Septic shock (HCC)  Assessment & Plan  Presentation includes: Severe sepsis present and persistent hypotension after 30 ml/kg completed.   Septic shock 2/2 covid  TTE 10/18: mild pHTN, EF 65%  Levophed titrate to MAP > 65  Add precedex, wean propofol     Discharge Summary 21 Dr. POLLY Lagos  Patient was  admitted to the ICU and treated with Tocilizumab and dexamethasone.  Patient was also in septic shock requiring placement of central line and IV vasopressors as well as tube feedings.        Treatment: tocilimuzab, hydrocortisone, levophed, dexamethasone     Risk Factors: Acute on Chronic Respiratory Failure, COVID 19 PNA    Huma Senior  Coding   [email or web address]  Options provided:   -- The condition of Sepsis was present at the time of inpatient admission   -- The condition of Sepsis was not present at the time of inpatient admission   -- The documentation is insufficient to determine if condition of sepsis is present on admission   -- The provider is unable to clinically determine whether condition of sepsis was present on admission or not      Query created by: Huma Senior on 11/16/2021 1:09 PM    RESPONSE TEXT:    The condition of Sepsis was present at the time of inpatient admission          Electronically signed by:  SHANNAN LOZA MD 11/16/2021 5:37 PM

## 2021-11-17 NOTE — TELEPHONE ENCOUNTER
Called patient and EC, no answer. Left messages on both numbers. Talked to a different EC but she is not with the patient and is also going to try and call her.

## 2021-11-17 NOTE — TELEPHONE ENCOUNTER
Adri alerted at 84% 1118. She was able to get right back up to the 90%. I called to check on her. She stated she was changing out her tank. She [place] [employer]. Will go back on concentrator when she gets back home

## 2021-11-18 ENCOUNTER — TELEPHONE (OUTPATIENT)
Dept: OTHER | Facility: MEDICAL CENTER | Age: 46
End: 2021-11-18

## 2021-11-18 VITALS — RESPIRATION RATE: 19 BRPM | HEART RATE: 76 BPM | OXYGEN SATURATION: 73 %

## 2021-11-18 VITALS — RESPIRATION RATE: 14 BRPM | OXYGEN SATURATION: 96 % | HEART RATE: 90 BPM

## 2021-11-19 ENCOUNTER — TELEPHONE (OUTPATIENT)
Dept: OTHER | Facility: MEDICAL CENTER | Age: 46
End: 2021-11-19

## 2021-11-19 VITALS — HEART RATE: 88 BPM | OXYGEN SATURATION: 81 % | RESPIRATION RATE: 14 BRPM

## 2021-11-20 ENCOUNTER — TELEPHONE (OUTPATIENT)
Dept: OTHER | Facility: MEDICAL CENTER | Age: 46
End: 2021-11-20

## 2021-11-20 VITALS — HEART RATE: 78 BPM | RESPIRATION RATE: 13 BRPM | OXYGEN SATURATION: 83 %

## 2021-11-20 VITALS — HEART RATE: 90 BPM | OXYGEN SATURATION: 82 % | RESPIRATION RATE: 14 BRPM

## 2021-11-20 VITALS — OXYGEN SATURATION: 95 % | RESPIRATION RATE: 16 BRPM | HEART RATE: 81 BPM

## 2021-11-20 VITALS — RESPIRATION RATE: 23 BRPM | HEART RATE: 92 BPM | OXYGEN SATURATION: 72 %

## 2021-11-20 VITALS — OXYGEN SATURATION: 96 % | HEART RATE: 79 BPM | RESPIRATION RATE: 15 BRPM

## 2021-11-20 NOTE — TELEPHONE ENCOUNTER
Called patient spoke with  and he advised she is okay and was on the porch he also had questions about her O2 equipment and I referred them to call the O2 company and to leave message for DR Juares on [facility]. Will leave note and follow up

## 2021-11-21 NOTE — TELEPHONE ENCOUNTER
2356   Called patient due to her Sp02 dropping down to 78%. Patient stated to be feeling well, just getting some sleep.  2357   Sp02 93%  BPM 87  R 16

## 2021-11-22 ENCOUNTER — TELEPHONE (OUTPATIENT)
Dept: OTHER | Facility: MEDICAL CENTER | Age: 46
End: 2021-11-22

## 2021-11-22 ENCOUNTER — TELEPHONE (OUTPATIENT)
Dept: SCHEDULING | Facility: IMAGING CENTER | Age: 46
End: 2021-11-22

## 2021-11-22 DIAGNOSIS — I16.0 HYPERTENSIVE URGENCY: ICD-10-CM

## 2021-11-22 RX ORDER — CARVEDILOL 25 MG/1
25 TABLET ORAL 2 TIMES DAILY WITH MEALS
Qty: 180 TABLET | Refills: 0 | Status: SHIPPED | OUTPATIENT
Start: 2021-11-22 | End: 2022-02-14

## 2021-11-22 NOTE — TELEPHONE ENCOUNTER
Message  Received: Today  LUC Healy R.N.  Caller: Unspecified (Today,  9:09 AM)  Agree with PCP. Fine to continue current BP meds (carvedilol and amlodipine) rather than her previous meds. Thanks!   ______________________________________________________________________________    RX refills sent. Spoke to patient over phone and gave update.

## 2021-11-22 NOTE — PATIENT COMMUNICATION
Called and spoke with [facility].  They are needing a liter flow and frequency.

## 2021-11-22 NOTE — TELEPHONE ENCOUNTER
Patient is reaching out about the portable oxygen machine. She has not heard anything on this and states that the paperwork has not been completed. Could you check on this and please reach out to her.    Thank You  Aleta CHEN

## 2021-11-22 NOTE — TELEPHONE ENCOUNTER
HK    Patient had covid about 3 weeks ago and was given different medications and would like a call to discuss all of them.     Thank You,  Aleta CHEN

## 2021-11-22 NOTE — TELEPHONE ENCOUNTER
Spoke to patient over phone. Patient states that three weeks ago she had Covid-19. She was hospitalized for several days. Per patient, some of her medications were switched upon discharge. This was due to her persistent HTN.     Patient was taken off Metoprolol and started on Carvedilol 25 mg BID. She is wondering if she should continue Carvedilol or switch back to Metoprolol. Per patient, she has felt much better on the Carvedilol and has not experienced palpitations or other symptoms.     Patient was also prescribed Amlodipine 10 mg daily. She has been taking this along with Felodipine 10 mg. Patient states she had follow up visit with PCP on 11/19/21. PCP recommended she stop taking Felodipine. Per patient, she discontinued today. She would like to know which medication to continue taking.     Patient states she has not been monitoring BP the last several days. Last week her SBP ranged 117-123, DBP 64-66. Assured patient that BP is within normal range. Recommended that patient monitor and log BP daily, 2 hours after taking morning medications. Patient verbalizes understanding and will start monitoring daily.    Overall, patient states she has been feeling much better post COVID-19.        To HK: Patient last seen 06/16/21. Please advise on which medications to continue.

## 2021-11-23 ENCOUNTER — TELEPHONE (OUTPATIENT)
Dept: OTHER | Facility: MEDICAL CENTER | Age: 46
End: 2021-11-23

## 2021-11-23 ENCOUNTER — TELEPHONE (OUTPATIENT)
Dept: SLEEP MEDICINE | Facility: MEDICAL CENTER | Age: 46
End: 2021-11-23

## 2021-11-23 VITALS — RESPIRATION RATE: 17 BRPM | HEART RATE: 89 BPM | OXYGEN SATURATION: 79 %

## 2021-11-23 DIAGNOSIS — J96.01 ACUTE RESPIRATORY FAILURE WITH HYPOXIA (HCC): ICD-10-CM

## 2021-11-23 DIAGNOSIS — U07.1 PNEUMONIA DUE TO COVID-19 VIRUS: ICD-10-CM

## 2021-11-23 DIAGNOSIS — J12.82 PNEUMONIA DUE TO COVID-19 VIRUS: ICD-10-CM

## 2021-11-23 NOTE — TELEPHONE ENCOUNTER
Have spoken to Angelito a few times today regarding an order for portable oxygen for his wife. Dr. Juares placed the order last week which was faxed over to [employer]. Unfortunately there was more information needed on the order. Angelito also was concerned as he spoke to [employer] today and was informed that the oxygen prescription was thrown away and they did not have any portable tanks. This RN contacted [employer] and was told that they have the order for the portable tanks but it is currently on hold pending clarification on liter flow and frequency. [employer] representative stated she was sorry for any miscommunication or confusion related to the order. This RN reached out to Dr. Sorenson and asked if he could place a new order with the above requirements. He is finishing with inpatients and will place the order this evening. This RN reached back out to Angelito and updated.

## 2021-11-23 NOTE — TELEPHONE ENCOUNTER
Called patient, she advises she is doing well. Her o2 mask came off while she was sleeping. She put it back on and her o2 sat came back up.

## 2021-11-24 ENCOUNTER — TELEPHONE (OUTPATIENT)
Dept: OTHER | Facility: MEDICAL CENTER | Age: 46
End: 2021-11-24

## 2021-11-24 VITALS — RESPIRATION RATE: 10 BRPM | OXYGEN SATURATION: 97 % | HEART RATE: 79 BPM

## 2021-11-24 VITALS — HEART RATE: 72 BPM | OXYGEN SATURATION: 97 %

## 2021-11-24 NOTE — TELEPHONE ENCOUNTER
Contacted patient due to disconnect. Patient let me know that his battery has been blinking red and she changed her battery about 3 days ago. She let me know she would be changing the battery and get reconnected. I let her know that if in 20 minutes she did not connect I would call her back and she said that sounded great.

## 2021-12-06 ASSESSMENT — ENCOUNTER SYMPTOMS
WHEEZING: 0
FOCAL WEAKNESS: 0
COUGH: 0
IRREGULAR HEARTBEAT: 0
WEAKNESS: 0
ORTHOPNEA: 0
NEAR-SYNCOPE: 0
SHORTNESS OF BREATH: 0
VOMITING: 0
DYSPNEA ON EXERTION: 0
PND: 0
DIARRHEA: 0
FEVER: 0
PALPITATIONS: 0
DIZZINESS: 0
ABDOMINAL PAIN: 0
SYNCOPE: 0
NIGHT SWEATS: 0
NAUSEA: 0

## 2021-12-06 NOTE — PROGRESS NOTES
Cardiology Follow-up Consultation Note    Date of note:    12/13/21  Primary Care Provider: Deniz Murcia M.D.    Patient Name: Adri Maldonado   YOB: 1975  MRN:              [id number]    Chief Complaint: Follow-up HTN     History of Present Illness: Ms. Adri Maldonado is a 46 y.o. female whose current medical problems include HTN (diagnosed 2015), polycythemia vera, and COPD who is here for follow up HTN.    The patient was last seen in my clinic on 6/16/2021. During the last visit, BP was borderline elevated, and HCTZ was added to her anti-hypertensives. Echocardiogram was done since last visit, which showed mild pulmonary hypertension, likely due to lung disease, and borderline dilated ascending aorta.  Since then, the patient had COVID infection in late October, and is now on oxygen.  She has been switched from hydrochlorothiazide to Lasix.    The patient returns today for follow-up.  She reports that she has constipation with carvedilol, and stopped taking carvedilol since yesterday.  Her BP is elevated in the office currently.  I recommended that the patient try over-the-counter laxatives instead of stopping carvedilol, which she is agreeable to.  When she was taking carvedilol, the patient reports that her blood pressure has been 120s-130s systolic and 70s diastolic.   She denies any symptoms.  She denies any chest pain or shortness of breath.  No orthopnea, PND, or leg swelling.  No palpitations.  No syncope or presyncopal episodes.      Cardiovascular Risk Factors:  1. Smoking status: Former smoker  2. Type II Diabetes Mellitus: None  3. Hypertension: On medication  4. Dyslipidemia: None  5. Family history of early Coronary Artery Disease in a first degree relative (Male less than 55 years of age; Female less than 65 years of age): None  6.  Obesity and/or Metabolic Syndrome: BMI 50.42  7. Sedentary lifestyle: Not sedentary, walking every other day ~0.5 mile    Review of Systems  "  Constitutional: Negative for fever, malaise/fatigue and night sweats.   Cardiovascular: Negative for chest pain, dyspnea on exertion, irregular heartbeat, leg swelling, near-syncope, orthopnea, palpitations, paroxysmal nocturnal dyspnea and syncope.   Respiratory: Negative for cough, shortness of breath and wheezing.    Gastrointestinal: Negative for abdominal pain, diarrhea, nausea and vomiting.   Neurological: Negative for dizziness, focal weakness and weakness.       All other systems reviewed and are negative.       Current Outpatient Medications   Medication Sig Dispense Refill   • cetirizine (ZYRTEC) 10 MG Tab      • felodipine (PLENDIL) 10 MG TABLET SR 24 HR Take 10 mg by mouth every day.     • furosemide (LASIX) 40 MG Tab Take 1 Tablet by mouth every day. 90 Tablet 3   • carvedilol (COREG) 25 MG Tab Take 1 Tablet by mouth 2 times a day with meals. Hold for systolic BP <105 or heart rate less than 55. 180 Tablet 0   • budesonide-formoterol (SYMBICORT) 160-4.5 MCG/ACT Aerosol Inhale 2 Puffs 2 times a day. 1 Each 2   • lisinopril (PRINIVIL) 20 MG Tab Take 20 mg by mouth 2 times a day.     • albuterol (PROVENTIL) 2.5mg/3ml Nebu Soln solution for nebulization Take 2.5 mg by nebulization every four hours as needed for Shortness of Breath.     • aspirin EC (ECOTRIN) 81 MG Tablet Delayed Response Take 81 mg by mouth every day.     • albuterol 108 (90 Base) MCG/ACT Aero Soln inhalation aerosol Inhale 2 Puffs by mouth every 6 hours as needed for Shortness of Breath. 8.5 g 1     No current facility-administered medications for this visit.         No Known Allergies      Physical Exam:  Ambulatory Vitals  /90 (BP Location: Left arm, Patient Position: Sitting, BP Cuff Size: Adult)   Pulse (!) 117   Resp 16   Ht 1.651 m (5' 5\")   Wt (!) 136 kg (299 lb)   SpO2 96%    Oxygen Therapy:  Pulse Oximetry: 96 %  BP Readings from Last 4 Encounters:   12/13/21 152/90   11/07/21 (!) 176/106   10/30/21 140/88   06/16/21 " 138/86       Weight/BMI: Body mass index is 49.76 kg/m².  Wt Readings from Last 4 Encounters:   12/13/21 (!) 136 kg (299 lb)   11/06/21 (!) 133 kg (293 lb 10.4 oz)   10/30/21 (!) 136 kg (300 lb 6.4 oz)   06/16/21 (!) 137 kg (303 lb)       General: Well appearing and in no apparent distress  Eyes: nl conjunctiva, no icteric sclera  ENT: wearing a mask, normal external appearance of ears  Neck: no visible JVP,  no carotid bruits  Lungs: normal respiratory effort, CTAB  Heart: RRR, no murmurs, no rubs or gallops,  no edema bilateral lower extremities. No LV/RV heave on cardiac palpatation. + bilateral radial pulses.  + bilateral dp pulses.   Abdomen: soft, non tender, non distended, no masses, normal bowel sounds.  No HSM.  Extremities/MSK: no clubbing, no cyanosis  Neurological: No focal sensory deficits  Psychiatric: Appropriate affect, A/O x 3, intact judgement and insight  Skin: Warm extremities      Lab Data Review:  Lab Results   Component Value Date/Time    TRIGLYCERIDE 161 (H) 11/02/2021 02:06 AM       Lab Results   Component Value Date/Time    SODIUM 138 11/07/2021 05:06 AM    POTASSIUM 4.0 11/07/2021 05:06 AM    CHLORIDE 96 11/07/2021 05:06 AM    CO2 34 (H) 11/07/2021 05:06 AM    GLUCOSE 106 (H) 11/07/2021 05:06 AM    BUN 31 (H) 11/07/2021 05:06 AM    CREATININE 0.58 11/07/2021 05:06 AM    CREATININE 0.6 05/28/2006 03:30 PM     Lab Results   Component Value Date/Time    ALKPHOSPHAT 87 11/07/2021 05:06 AM    ASTSGOT 61 (H) 11/07/2021 05:06 AM    ALTSGPT 58 (H) 11/07/2021 05:06 AM    TBILIRUBIN 1.3 11/07/2021 05:06 AM      Lab Results   Component Value Date/Time    WBC 12.3 (H) 11/06/2021 04:17 AM     No results found for: HBA1C      Cardiac Imaging and Procedures Review:    EKG dated 6/16/2021: My personal interpretation is Sinus rhythm, biatrial abnormalities.  Borderline right axis deviation    Echo dated 10/18/2021:  CONCLUSIONS  Technically difficult study.  Grossly normal left and right ventricular  systolic function.  The valves were suboptimally visualized but no significant Doppler   abnormalities in mitral and aortic valves.   Mild tricuspid regurgitation. Mild pulmonary hypertension with   estimated right ventricular systolic pressure 55 mmHg.   The ascending aorta diameter is borderline dilated at 3.8 cm.  Compared to prior echo on 06/29/19, there is now mild pulmonary   hypertension (previously not measured) and borderline dilated ascending aorta.    Echo dated 6/29/2019:   CONCLUSIONS  No prior study is available for comparison.   Normal left ventricular systolic function. Left ventricular ejection   fraction is visually estimated to be 65%.   Normal diastolic function.  Normal inferior vena cava size and inspiratory collapse.    Assessment & Plan     1. Essential hypertension  EC-ECHOCARDIOGRAM COMPLETE W/O CONT    furosemide (LASIX) 40 MG Tab   2. At risk for aneurysm of ascending aorta  EC-ECHOCARDIOGRAM COMPLETE W/O CONT    furosemide (LASIX) 40 MG Tab   3. Pulmonary hypertension (HCC)  EC-ECHOCARDIOGRAM COMPLETE W/O CONT    furosemide (LASIX) 40 MG Tab         Shared Medical Decision Making:    Hypertension  BP elevated this visit, likely due to stopping carvedilol  -Resume carvedilol 25 mg twice daily.  Patient to try over-the-counter laxatives  -Continue lisinopril 20 mg twice daily and felodipine 10 mg daily  -Continue Lasix 40 mg daily  -Continue treatment for sleep apnea  -Counseled the patient on diet and exercise    Borderline dilated ascending aorta  -Repeat echocardiogram in 1 year, ordered this visit.  Based on measurement on the next echo, may need CTA as well  -Management as above     All of the patient's excellent questions were answered to the best of my knowledge and to her satisfaction.  It was a pleasure seeing Ms. Adri Maldonado in my clinic today. Return in about 1 year (around 12/13/2022). Patient is aware to call the cardiology clinic with any questions or concerns.      Htet  MD Danilo  [facility]  [facility], [address]  [address] [place] [address]  [place] [address]  Phone: [phone]  Fax: [phone]

## 2021-12-13 ENCOUNTER — OFFICE VISIT (OUTPATIENT)
Dept: CARDIOLOGY | Facility: MEDICAL CENTER | Age: 46
End: 2021-12-13
Payer: COMMERCIAL

## 2021-12-13 VITALS
OXYGEN SATURATION: 96 % | HEART RATE: 117 BPM | HEIGHT: 65 IN | SYSTOLIC BLOOD PRESSURE: 152 MMHG | DIASTOLIC BLOOD PRESSURE: 90 MMHG | BODY MASS INDEX: 48.82 KG/M2 | RESPIRATION RATE: 16 BRPM | WEIGHT: 293 LBS

## 2021-12-13 DIAGNOSIS — Z91.89 AT RISK FOR ANEURYSM OF ASCENDING AORTA: ICD-10-CM

## 2021-12-13 DIAGNOSIS — I10 ESSENTIAL HYPERTENSION: ICD-10-CM

## 2021-12-13 DIAGNOSIS — I27.20 PULMONARY HYPERTENSION (HCC): ICD-10-CM

## 2021-12-13 PROCEDURE — 99214 OFFICE O/P EST MOD 30 MIN: CPT | Performed by: STUDENT IN AN ORGANIZED HEALTH CARE EDUCATION/TRAINING PROGRAM

## 2021-12-13 RX ORDER — FELODIPINE 10 MG/1
10 TABLET, EXTENDED RELEASE ORAL DAILY
COMMUNITY

## 2021-12-13 RX ORDER — CETIRIZINE HYDROCHLORIDE 10 MG/1
TABLET ORAL
COMMUNITY
Start: 2021-11-19

## 2021-12-13 RX ORDER — FUROSEMIDE 40 MG/1
40 TABLET ORAL DAILY
Qty: 90 TABLET | Refills: 3 | Status: SHIPPED | OUTPATIENT
Start: 2021-12-13

## 2021-12-13 ASSESSMENT — FIBROSIS 4 INDEX: FIB4 SCORE: 1.17

## 2022-02-13 DIAGNOSIS — I16.0 HYPERTENSIVE URGENCY: ICD-10-CM

## 2022-02-14 RX ORDER — CARVEDILOL 25 MG/1
25 TABLET ORAL 2 TIMES DAILY WITH MEALS
Qty: 180 TABLET | Refills: 3 | Status: SHIPPED | OUTPATIENT
Start: 2022-02-14

## 2022-06-30 ENCOUNTER — OFFICE VISIT (OUTPATIENT)
Dept: URGENT CARE | Facility: CLINIC | Age: 47
End: 2022-06-30
Payer: COMMERCIAL

## 2022-06-30 VITALS
WEIGHT: 293 LBS | HEART RATE: 88 BPM | RESPIRATION RATE: 14 BRPM | TEMPERATURE: 97.9 F | SYSTOLIC BLOOD PRESSURE: 135 MMHG | OXYGEN SATURATION: 91 % | BODY MASS INDEX: 55.32 KG/M2 | HEIGHT: 61 IN | DIASTOLIC BLOOD PRESSURE: 80 MMHG

## 2022-06-30 DIAGNOSIS — J44.1 COPD EXACERBATION (HCC): ICD-10-CM

## 2022-06-30 LAB
EXTERNAL QUALITY CONTROL: NORMAL
SARS-COV+SARS-COV-2 AG RESP QL IA.RAPID: NEGATIVE

## 2022-06-30 PROCEDURE — 87426 SARSCOV CORONAVIRUS AG IA: CPT | Performed by: FAMILY MEDICINE

## 2022-06-30 PROCEDURE — 99214 OFFICE O/P EST MOD 30 MIN: CPT | Performed by: FAMILY MEDICINE

## 2022-06-30 ASSESSMENT — FIBROSIS 4 INDEX: FIB4 SCORE: 1.17

## 2022-07-01 NOTE — PROGRESS NOTES
"      Chief Complaint   Patient presents with   • Headache, cough     X 3 days          Cough  This is a new problem. The current episode started 3 d ago. The problem has been gradually unchanged.   She has COPD and currently on home O2        The cough is productive of sputum. Associated symptoms include: low grade fever, headaches.   Denies wheezing or SOB.       Denies chest pain or leg swelling       Patient has tried albuterol, Symbicort for the symptoms - minor improvement.   Has hx of COPD         Social History     Tobacco Use   • Smoking status: Former Smoker     Years: 9.00     Types: Cigarettes   • Smokeless tobacco: Never Used   Vaping Use   • Vaping Use: Never used   Substance Use Topics   • Alcohol use: Not Currently     Comment: occ   • Drug use: No           Past Medical History:   Diagnosis Date   • ASTHMA          No family history on file.        Review of Systems        HENT: negative for ear pain.    Cardiovascular - denies chest pain or dyspnea  Respiratory: Positive for cough.  Cough is productive.  Negative for wheezing.    Neurological: Negative for headaches.   GI - denies nausea, vomiting or diarrhea  Neuro - denies numbness or tingling.            Objective:      /80   Pulse 88   Temp 36.6 °C (97.9 °F) (Temporal)   Resp 14   Ht 1.549 m (5' 1\")   Wt (!) 137 kg (301 lb)   SpO2 91%       Physical Exam   Constitutional: patient is oriented to person, place, and time. Patient appears well-developed and well-nourished. No distress.   HENT:   Head: Normocephalic and atraumatic.   Right Ear: External ear normal.   Left Ear: External ear normal.   Nose: Mucosal edema  present. Right sinus exhibits no maxillary sinus tenderness. Left sinus exhibits no maxillary sinus tenderness.   Mouth/Throat: Mucous membranes are normal. No oral lesions.  No oropharyngeal exudate or posterior oropharyngeal edema.   Eyes: Conjunctivae and EOM are normal. Pupils are equal, round, and reactive to light. " Right eye exhibits no discharge. Left eye exhibits no discharge. No scleral icterus.   Neck: Normal range of motion. Neck supple. No tracheal deviation present.   Cardiovascular: Normal rate, regular rhythm and normal heart sounds.  Exam reveals no friction rub.    Pulmonary/Chest: Effort normal. No respiratory distress.  Patient has no rhonchi or wheezing. Patient has no rales.    Musculoskeletal:  exhibits no edema.   Lymphadenopathy:    no cervical LAD  Neurological: patient is alert and oriented to person, place, and time.   Skin: Skin is warm and dry. No rash noted. No erythema.   Psychiatric: patient  has a normal mood and affect.  behavior is normal.   Nursing note and vitals reviewed.              Assessment/Plan:       1. COPD exacerbation (HCC)     Rapid covid was negative.     Pt is borderline hypoxic, currently on 2L  I advised to titrate O2 to 3L    Pt refused cxr, refused systemic steroid or abx            Supportive care, differential diagnoses, and indications for immediate follow-up discussed with patient.   Pathogenesis of diagnosis discussed including typical length and natural progression.   Instructed to return to clinic or nearest emergency department for any change in condition, further concerns, or worsening of symptoms.  Patient states understanding of the plan of care and discharge instructions.

## 2022-07-28 ENCOUNTER — OCCUPATIONAL THERAPY (OUTPATIENT)
Dept: OCCUPATIONAL THERAPY | Facility: REHABILITATION | Age: 47
End: 2022-07-28
Attending: PHYSICIAN ASSISTANT
Payer: COMMERCIAL

## 2022-07-28 DIAGNOSIS — B94.8 SEQUELAE OF OTHER SPECIFIED INFECTIOUS AND PARASITIC DISEASES: ICD-10-CM

## 2022-07-28 PROCEDURE — 97165 OT EVAL LOW COMPLEX 30 MIN: CPT

## 2022-07-28 PROCEDURE — 97530 THERAPEUTIC ACTIVITIES: CPT

## 2022-07-28 SDOH — ECONOMIC STABILITY: GENERAL: QUALITY OF LIFE: GOOD

## 2022-07-28 ASSESSMENT — ENCOUNTER SYMPTOMS
PAIN SCALE: 0
PAIN SCALE AT HIGHEST: 0
PAIN SCALE AT LOWEST: 0

## 2022-07-28 ASSESSMENT — ACTIVITIES OF DAILY LIVING (ADL)
SHOPPING: MODERATE ASSIST
AMBULATION_WITHOUT_ASSISTIVE_DEVICE: INDEPENDENT
BATHING_POSITION: STANDING
LIGHT HOUSEKEEPING: MODERATE ASSIST
BATHING_CURRENT_FUNCTION: SUPERVISION
PREPARING MEALS: MINIMUM ASSIST
DRESSING_CURRENT_FUNCTION: SUPERVISION
LAUNDRY: MAXIMUM ASSIST

## 2022-07-28 NOTE — OP THERAPY EVALUATION
Outpatient Occupational Therapy  INITIAL EVALUATION    [facility] Occupational Therapy [address]  [address]  [facility] [address]  Phone:  [phone]  Fax:  [phone]    Date of Evaluation: 07/28/2022    Patient: Adri Maldonado  YOB: 1975  MRN: [id number]     Referring Provider: Lorna Desir P.A.-C.  [address] [place] [address]   Referring Diagnosis Sequelae of other specified infectious and parasitic diseases [B94.8]     Time Calculation    Start time: 1100  Stop time: 1145 Time Calculation (min): 45 minutes             Chief Complaint: Self Care Duties and Extremity Weakness    Visit Diagnoses     ICD-10-CM   1. Sequelae of other specified infectious and parasitic diseases  B94.8       Subjective:   History of Present Illness:     Date of onset:  10/31/2021    Mechanism of injury:  As per hospital discharge summary:  Reason for Admission  Respiratory Distress      Admission Date  10/31/2021     CODE STATUS  Prior     HPI & HOSPITAL COURSE  Adri Maldonado is a 46 y.o. female with a history of hypertension, asthma, chronic hypoxemic respiratory failure on 2 LPM oxygen 24/7, morbid obesity BMI 49, obstructive sleep apnea, and secondary polycythemia who presented 10/31/2021 worsening shortness of breath in setting of recent positive rapid Covid 19 test day prior.  On presentation she was saturating at 72% on 3 LPM oxygen by nasal cannula.  She was encephalopathic and in the ER had a sodium of 116.  She was hypoxic, cyanotic, and in extreme distress.  She was an extremely difficult intubation but was finally intubated with a 6.0 ET tube.     Patient was admitted to the ICU and treated with Tocilizumab and dexamethasone.  Patient was also in septic shock requiring placement of central line and IV vasopressors as well as tube feedings.  Her hyponatremia was corrected with 3% sodium.  On 11/2/2021, patient self extubated.  Due to to nocturnal hypoxia, patient was  placed on nocturnal BiPAP and continued on Precedex drip for agitation.  She became very hypertensive despite multiple antihypertensives.  Patient developed sialoadenitis, which was suspected to be secondary to trauma from difficult intubation in the ER.  Patient was started on dexamethasone as well as Unasyn, which was transitioned to Augmentin on discharge.  Patient had BiPAP approved through her S PCP.  Although the intensivist recommended having BiPAP arrived and titrated in the hospital prior to discharge, patient requested be discharged following transfer to the floor.  She was back to her baseline oxygen needs of 2 LPM by nasal cannula.  Patient's home metoprolol was changed to carvedilol due to persistent hypertension.  She was also discharged with course of furosemide for continued diuresis and for improved blood pressure control.  Patient was advised to follow up with her PCP in 1 week with labs.  She was also advised to increase her oxygen via nasal cannula to 4 LPM at nighttime until she had her BiPAP, which she reported will try to obtain as soon as possible.  Patient was also counseled on healthy lifestyle changes including healthy diet and regular exercise for directed weight loss goal.  Per patient's request, patient's Advair was changed to Symbicort on discharge.  Quality of life:  Good  Prior level of function:  Normally independent with all ADLs and drove  Headaches:  no headaches  Ear problems: none  Sleep disturbance:  Uses breathing assistive device at night  Pain:     Current pain ratin    At best pain ratin    At worst pain ratin  Social Support:     Lives in:  Multiple-level home (tri-level)    Lives with:  Adult children and significant other (All 5 grown children live at home and  available to help as needed)  Hand dominance:  Right  Diagnostic Tests:     X-ray: abnormal      Diagnostic Tests Comments:     2021 5:05 AM     HISTORY/REASON FOR EXAM: For indication of  respiratory failure; For indication of respiratory failure.     TECHNIQUE/EXAM DESCRIPTION AND NUMBER OF VIEWS:  Single AP view of the chest.     COMPARISON: Yesterday     FINDINGS:     Hardware: No change. Endotracheal tube terminates above the antoine. Left IJ line tip overlies the SVC. NG tube terminates below the radiograph.     Lungs: Moderate multifocal consolidation left greater than right is mildly improved     Pleura:  No pleural space process is seen.     Heart and mediastinum: There is stable cardiac silhouette enlargement.     IMPRESSION:     Mild improvement in bilateral multifocal consolidation compatible with Covid pneumonia  Treatments:     None    Activities of Daily Living:     Patient reported ADL status: Managing to complete ADLs; however tires easily and requires frequent rest breaks.  Trying to build up walking tolerance outdoors and can manage about 400 feet before tiring and sats dropping.  Currently on continuous supplemental oxygen at 2 liters.  Able to stand during functional activity about 3 minutes prior to needing to sit and rest.    Patient Goals:     Patient goals for therapy:  Increased strength, independence with ADLs/IADLs and return to sport/leisure activities      Past Medical History:   Diagnosis Date   • ASTHMA      No past surgical history on file.  Social History     Tobacco Use   • Smoking status: Former Smoker     Years: 9.00     Types: Cigarettes   • Smokeless tobacco: Never Used   Substance Use Topics   • Alcohol use: Not Currently     Comment: occ     Family and Occupational History     Socioeconomic History   • Marital status:      Spouse name: Not on file   • Number of children: Not on file   • Years of education: Not on file   • Highest education level: Not on file   Occupational History   • Not on file       Objective     Strength:      Left Wrist/Hand    (2nd hand position)     Trial 1: 61    Right Wrist/Hand    (2nd hand position)     Trial 1:  68  Ambulation     Ambulation: Level Surfaces   Ambulation without assistive device: independent    Additional Level Surfaces Ambulation Details  Able to ambulate about 400 feet prior to requiring rest break and sats dropping below 92%    General Comments     Wrist/Hand Comments  Full AROM of all extremities     Activities of Daily Living:     Bathing:     Bathing: supervision    Bathing position: standing    Bathing comments:   Tub shower with on-slip mat.  Patient stated that she showers quickly - about 5 minutes without oxygen, while standing.      Dressing:     Dressing: supervision    Dressing comments:   Patient stated that she is sitting while dressing self     Household Management:     Housekeeping: moderate assist    Laundry: maximum assist    Meal preparation: minimum assist    Shopping: moderate assist    Household Management Comments:  Uses shopping cart while walking in grocery store, but can only go into store for about 5-10 minutes and then tires.  Family are completing heavier domestic tasks.          Therapeutic Treatments and Modalities:    1. Therapeutic Activities (CPT 63549)    Therapeutic Treatments and Modalities Summary: Issued information and discussed energy conservation techniques and use of perching stool during kitchen and bathroom tasks.  Issued information regarding [employer] for assistance for DME.    Reviewed exercise program that patient has been doing at home to improve UE strength and endurance.  Recommended 3# free weights 3 x 10 in all planes.  Attempt them standing and time how long she can tolerate standing and then sit for completion of exercises.       Time-based treatments/modalities:    Occupational Therapy Timed Treatment Charges  Therapeutic activity minutes (CPT 73110): 15 minutes      Assessment, Response and Plan:   Impairments: activity intolerance, impaired physical strength, lacks appropriate home exercise program and limited ADL's    Assessment details:  Patient  is a 48 y/o female with multiple medical issues s/p COVID at end of last year and presenting with pulmonary limitations due to illness.  Patient presenting with impaired endurance/activity tolerance affecting level of function. Patient is highly motivated to return to her prior level of function and has a supportive family at home with her.  Patient would benefit from OT intervention to enhance level of endurance/activity tolerance during functional tasks to improve her functional level.    Patient would also benefit from Physical Therapy intervention to improve pulmonary function.    Barriers to therapy:  None  Prognosis: good    Goals:   Short Term Goals:   Patient will be independent with HEP  Patient will have increased knowledge regarding energy conservation techniques and how to incorporate during ADLs.   Patient will have increased knowledge of pursed lip breathing, diaphragmatic breathing and how to coordinate them during daily activities.    Short term goal timespan:  2-4 weeks    Long Term Goals:   Patient will tolerate standing for at least 10 minutes during a functional task   Patient will score 9 seconds or less on the TUG test  Patient will score 25 points or less on the Modified Fatigue Impact Scale  Patient will be mod I completing cooking and clean up task using DME and compensatory techniques.    Long term goal timespan:  6-8 weeks    Plan:   Therapy options:  Occupational therapy treatment to continue  Planned therapy interventions:  Therapeutic Activities (CPT 64928), Therapeutic Exercise (CPT 29924) and Self Care ADL Training (CPT 69235)  Frequency:  1x week  Duration in weeks:  8  Duration in visits:  8  Discussed with:  Patient and family      Functional Assessment Used:  UEFI = 79/80  Timed Up and Go (TUG) Test  Time, in seconds (up from chair, walk 3m and return to chair and sit): 12 seconds   Modified Fatigue Impact Scale:  39  Referring provider co-signature:  I have reviewed this plan of  care and my co-signature certifies the need for services.    Certification Period: 07/28/2022 to  09/22/22    Physician Signature: ________________________________ Date: ______________

## 2022-08-04 ENCOUNTER — OCCUPATIONAL THERAPY (OUTPATIENT)
Dept: OCCUPATIONAL THERAPY | Facility: REHABILITATION | Age: 47
End: 2022-08-04
Attending: PHYSICIAN ASSISTANT
Payer: COMMERCIAL

## 2022-08-04 DIAGNOSIS — B94.8 SEQUELAE OF OTHER SPECIFIED INFECTIOUS AND PARASITIC DISEASES: ICD-10-CM

## 2022-08-04 PROCEDURE — 97110 THERAPEUTIC EXERCISES: CPT

## 2022-08-04 PROCEDURE — 97535 SELF CARE MNGMENT TRAINING: CPT

## 2022-08-04 NOTE — OP THERAPY DAILY TREATMENT
Outpatient Occupational Therapy  DAILY TREATMENT     [facility] [address]  [address]  [place] [address]  Phone:  [phone]  Fax:  [phone]    Date: 08/04/2022    Patient: Adri Maldonado  YOB: 1975  MRN: [id number]     Time Calculation  Start time: 0100  Stop time: 0145 Time Calculation (min): 45 minutes         Chief Complaint: Self Care Duties and Extremity Weakness    Visit #: 2    SUBJECTIVE:  My daughter and I have set up a routine in the gym and I am taking my time and managing about 30 minutes.     OBJECTIVE:  Current objective measures:   Strength:       Left Wrist/Hand    (2nd hand position)     Trial 1: 67     Right Wrist/Hand    (2nd hand position)     Trial 1: 80  Ambulation      Ambulation: Level Surfaces   Ambulation without assistive device: independent    Additional Level Surfaces Ambulation Details  Able to ambulate about 400 feet prior to requiring rest break and sats dropping below 92%     General Comments      Wrist/Hand Comments  Full AROM of all extremities      Activities of Daily Living:      Bathing:     Bathing: supervision    Bathing position: standing     Bathing comments:   Tub shower with on-slip mat.  Patient stated that she showers quickly - about 5 minutes without oxygen, while standing.       Dressing:     Dressing: supervision     Dressing comments:   Patient stated that she is sitting while dressing self      Household Management:     Housekeeping: moderate assist    Laundry: maximum assist    Meal preparation: minimum assist    Shopping: moderate assist     Household Management Comments:  Uses shopping cart while walking in grocery store, but can only go into store for about 5-10 minutes and then tires.  Family are completing heavier domestic tasks.          Therapeutic Exercises (CPT 80085):     1. Reviewed HEP and added Pulmonary rehab exercises to HEP and issued a handout.  Patient able to return  demontstrate with OTR, to complete 2 x a day along with HEP in place    2. UBE while standing, on resistance 1 and able to complete 4 minutes prior to sats dropping below 90 %  Returned to 95% after 2 minutes while engaging in PLB     Therapeutic Treatments and Modalities:    1. Self Care ADL Training (CPT 61557)    Therapeutic Treatments and Modalities Summary: Patient found a stool at home that she can perch on at the kitchen and using it when she is tired from standing at kitchen sink and stove top.  Has not had a chance to go over the energy conservation techniques hand out, but will do that when she gets home.      Time-based treatments/modalities:  Therapeutic exercise minutes (CPT 92753): 30 minutes  Self-care/ADL training minutes (CPT 06365): 15 minutes        Pain rating before treatment: 0  Pain rating after treatment: 0    ASSESSMENT:   Response to treatment: Progressing well and motivated to participate.  Awaiting PT referral from PCP    PLAN/RECOMMENDATIONS:   Plan for treatment: therapy treatment to continue next visit.  Planned interventions for next visit: Self care, therapeutic exercise and therapeutic activities.

## 2022-08-24 ENCOUNTER — TELEPHONE (OUTPATIENT)
Dept: OCCUPATIONAL THERAPY | Facility: REHABILITATION | Age: 47
End: 2022-08-24
Payer: COMMERCIAL

## 2022-08-24 NOTE — OP THERAPY DISCHARGE SUMMARY
Outpatient Occupational Therapy  DISCHARGE SUMMARY NOTE    [facility] [address]  [address]  [place] [address]  Phone:  [phone]  Fax:  [phone]    Date of Visit: 08/24/2022    Patient: Adri Maldonado  YOB: 1975  MRN: [id number]     Referring Provider: Lorna Desir P.A.-C.  [address] [facility] [place] [address]   Referring Diagnosis: Sequelae of other specified infectious and parasitic diseases [B94.8]           Your patient is being discharged from Occupational Therapy with the following comments:   Patient cancelled or missed more than 2 scheduled appointments/non-compliant  Patient has failed to schedule or reschedule follow-up visits    Comments:  Patient was seen for intimal evaluation on 7/28/22 and one follow up visit on 8/4/22.  She failed to attend the next appointment and has not scheduled further follow up appointments; therefore discharge from service.     Limitations Remaining:  unknown    Recommendations:  Continue with HEP    NELY Aquino/L    Date: 8/24/2022

## 2022-11-28 ENCOUNTER — APPOINTMENT (OUTPATIENT)
Dept: CARDIOLOGY | Facility: MEDICAL CENTER | Age: 47
End: 2022-11-28
Attending: STUDENT IN AN ORGANIZED HEALTH CARE EDUCATION/TRAINING PROGRAM
Payer: COMMERCIAL

## 2023-01-11 ENCOUNTER — APPOINTMENT (OUTPATIENT)
Dept: RADIOLOGY | Facility: MEDICAL CENTER | Age: 48
End: 2023-01-11
Attending: EMERGENCY MEDICINE
Payer: COMMERCIAL

## 2023-01-11 ENCOUNTER — HOSPITAL ENCOUNTER (EMERGENCY)
Facility: MEDICAL CENTER | Age: 48
End: 2023-01-11
Attending: EMERGENCY MEDICINE
Payer: COMMERCIAL

## 2023-01-11 VITALS
RESPIRATION RATE: 54 BRPM | TEMPERATURE: 97.5 F | DIASTOLIC BLOOD PRESSURE: 97 MMHG | HEART RATE: 85 BPM | WEIGHT: 293 LBS | OXYGEN SATURATION: 93 % | SYSTOLIC BLOOD PRESSURE: 176 MMHG | BODY MASS INDEX: 56.15 KG/M2

## 2023-01-11 DIAGNOSIS — R06.89 HYPERCAPNIA: ICD-10-CM

## 2023-01-11 DIAGNOSIS — J44.1 ACUTE EXACERBATION OF CHRONIC OBSTRUCTIVE PULMONARY DISEASE (COPD) (HCC): ICD-10-CM

## 2023-01-11 DIAGNOSIS — B33.8 RSV (RESPIRATORY SYNCYTIAL VIRUS INFECTION): ICD-10-CM

## 2023-01-11 DIAGNOSIS — J43.1 PANLOBULAR EMPHYSEMA (HCC): Chronic | ICD-10-CM

## 2023-01-11 LAB
ALBUMIN SERPL BCP-MCNC: 3.9 G/DL (ref 3.2–4.9)
ALBUMIN/GLOB SERPL: 1 G/DL
ALP SERPL-CCNC: 99 U/L (ref 30–99)
ALT SERPL-CCNC: 17 U/L (ref 2–50)
ANION GAP SERPL CALC-SCNC: 4 MMOL/L (ref 7–16)
ANISOCYTOSIS BLD QL SMEAR: ABNORMAL
AST SERPL-CCNC: 17 U/L (ref 12–45)
BASE EXCESS BLDV CALC-SCNC: 15 MMOL/L
BASOPHILS # BLD AUTO: 0.9 % (ref 0–1.8)
BASOPHILS # BLD: 0.08 K/UL (ref 0–0.12)
BILIRUB SERPL-MCNC: 0.5 MG/DL (ref 0.1–1.5)
BODY TEMPERATURE: ABNORMAL CENTIGRADE
BUN SERPL-MCNC: 11 MG/DL (ref 8–22)
CALCIUM ALBUM COR SERPL-MCNC: 9.5 MG/DL (ref 8.5–10.5)
CALCIUM SERPL-MCNC: 9.4 MG/DL (ref 8.5–10.5)
CHLORIDE SERPL-SCNC: 89 MMOL/L (ref 96–112)
CO2 SERPL-SCNC: 45 MMOL/L (ref 20–33)
CREAT SERPL-MCNC: 0.54 MG/DL (ref 0.5–1.4)
CRP SERPL HS-MCNC: 1.73 MG/DL (ref 0–0.75)
EKG IMPRESSION: NORMAL
EOSINOPHIL # BLD AUTO: 0.08 K/UL (ref 0–0.51)
EOSINOPHIL NFR BLD: 0.9 % (ref 0–6.9)
ERYTHROCYTE [DISTWIDTH] IN BLOOD BY AUTOMATED COUNT: 53.3 FL (ref 35.9–50)
FLUAV RNA SPEC QL NAA+PROBE: NEGATIVE
FLUBV RNA SPEC QL NAA+PROBE: NEGATIVE
GFR SERPLBLD CREATININE-BSD FMLA CKD-EPI: 113 ML/MIN/1.73 M 2
GLOBULIN SER CALC-MCNC: 3.8 G/DL (ref 1.9–3.5)
GLUCOSE SERPL-MCNC: 142 MG/DL (ref 65–99)
HCO3 BLDV-SCNC: 45 MMOL/L (ref 24–28)
HCT VFR BLD AUTO: 54 % (ref 37–47)
HGB BLD-MCNC: 16 G/DL (ref 12–16)
LACTATE SERPL-SCNC: 1.4 MMOL/L (ref 0.5–2)
LYMPHOCYTES # BLD AUTO: 2.42 K/UL (ref 1–4.8)
LYMPHOCYTES NFR BLD: 28.8 % (ref 22–41)
MACROCYTES BLD QL SMEAR: ABNORMAL
MANUAL DIFF BLD: NORMAL
MCH RBC QN AUTO: 28.9 PG (ref 27–33)
MCHC RBC AUTO-ENTMCNC: 29.6 G/DL (ref 33.6–35)
MCV RBC AUTO: 97.6 FL (ref 81.4–97.8)
MICROCYTES BLD QL SMEAR: ABNORMAL
MONOCYTES # BLD AUTO: 0.68 K/UL (ref 0–0.85)
MONOCYTES NFR BLD AUTO: 8.1 % (ref 0–13.4)
MORPHOLOGY BLD-IMP: NORMAL
NEUTROPHILS # BLD AUTO: 5.15 K/UL (ref 2–7.15)
NEUTROPHILS NFR BLD: 61.3 % (ref 44–72)
NRBC # BLD AUTO: 0 K/UL
NRBC BLD-RTO: 0 /100 WBC
NT-PROBNP SERPL IA-MCNC: 329 PG/ML (ref 0–125)
PCO2 BLDV: 74.1 MMHG (ref 41–51)
PH BLDV: 7.4 [PH] (ref 7.31–7.45)
PLATELET # BLD AUTO: 169 K/UL (ref 164–446)
PLATELET BLD QL SMEAR: NORMAL
PMV BLD AUTO: 9.4 FL (ref 9–12.9)
PO2 BLDV: 53.9 MMHG (ref 25–40)
POLYCHROMASIA BLD QL SMEAR: NORMAL
POTASSIUM SERPL-SCNC: 4.3 MMOL/L (ref 3.6–5.5)
PROCALCITONIN SERPL-MCNC: <0.05 NG/ML
PROT SERPL-MCNC: 7.7 G/DL (ref 6–8.2)
RBC # BLD AUTO: 5.53 M/UL (ref 4.2–5.4)
RBC BLD AUTO: PRESENT
RSV RNA SPEC QL NAA+PROBE: POSITIVE
SAO2 % BLDV: 88.8 %
SARS-COV-2 RNA RESP QL NAA+PROBE: NOTDETECTED
SODIUM SERPL-SCNC: 138 MMOL/L (ref 135–145)
SPECIMEN SOURCE: ABNORMAL
TROPONIN T SERPL-MCNC: 9 NG/L (ref 6–19)
WBC # BLD AUTO: 8.4 K/UL (ref 4.8–10.8)

## 2023-01-11 PROCEDURE — 85007 BL SMEAR W/DIFF WBC COUNT: CPT

## 2023-01-11 PROCEDURE — C9803 HOPD COVID-19 SPEC COLLECT: HCPCS | Performed by: EMERGENCY MEDICINE

## 2023-01-11 PROCEDURE — 93005 ELECTROCARDIOGRAM TRACING: CPT | Performed by: EMERGENCY MEDICINE

## 2023-01-11 PROCEDURE — 71045 X-RAY EXAM CHEST 1 VIEW: CPT

## 2023-01-11 PROCEDURE — 36415 COLL VENOUS BLD VENIPUNCTURE: CPT

## 2023-01-11 PROCEDURE — 85025 COMPLETE CBC W/AUTO DIFF WBC: CPT

## 2023-01-11 PROCEDURE — 83880 ASSAY OF NATRIURETIC PEPTIDE: CPT

## 2023-01-11 PROCEDURE — 82803 BLOOD GASES ANY COMBINATION: CPT

## 2023-01-11 PROCEDURE — 700111 HCHG RX REV CODE 636 W/ 250 OVERRIDE (IP): Performed by: EMERGENCY MEDICINE

## 2023-01-11 PROCEDURE — 84145 PROCALCITONIN (PCT): CPT

## 2023-01-11 PROCEDURE — 93005 ELECTROCARDIOGRAM TRACING: CPT

## 2023-01-11 PROCEDURE — 96374 THER/PROPH/DIAG INJ IV PUSH: CPT

## 2023-01-11 PROCEDURE — 700101 HCHG RX REV CODE 250: Performed by: EMERGENCY MEDICINE

## 2023-01-11 PROCEDURE — 84484 ASSAY OF TROPONIN QUANT: CPT

## 2023-01-11 PROCEDURE — 86140 C-REACTIVE PROTEIN: CPT

## 2023-01-11 PROCEDURE — 80053 COMPREHEN METABOLIC PANEL: CPT

## 2023-01-11 PROCEDURE — 83605 ASSAY OF LACTIC ACID: CPT

## 2023-01-11 PROCEDURE — 99284 EMERGENCY DEPT VISIT MOD MDM: CPT

## 2023-01-11 PROCEDURE — 0241U HCHG SARS-COV-2 COVID-19 NFCT DS RESP RNA 4 TRGT MIC: CPT

## 2023-01-11 RX ORDER — METHYLPREDNISOLONE SODIUM SUCCINATE 125 MG/2ML
125 INJECTION, POWDER, LYOPHILIZED, FOR SOLUTION INTRAMUSCULAR; INTRAVENOUS ONCE
Status: COMPLETED | OUTPATIENT
Start: 2023-01-11 | End: 2023-01-11

## 2023-01-11 RX ORDER — BUDESONIDE AND FORMOTEROL FUMARATE DIHYDRATE 160; 4.5 UG/1; UG/1
2 AEROSOL RESPIRATORY (INHALATION) 2 TIMES DAILY
Qty: 1 EACH | Refills: 2 | Status: SHIPPED | OUTPATIENT
Start: 2023-01-11

## 2023-01-11 RX ORDER — ALBUTEROL SULFATE 90 UG/1
2 AEROSOL, METERED RESPIRATORY (INHALATION) EVERY 4 HOURS PRN
Qty: 8 G | Refills: 2 | Status: SHIPPED | OUTPATIENT
Start: 2023-01-11

## 2023-01-11 RX ORDER — ALBUTEROL SULFATE 2.5 MG/3ML
2.5 SOLUTION RESPIRATORY (INHALATION) EVERY 4 HOURS PRN
Qty: 30 EACH | Refills: 2 | Status: SHIPPED | OUTPATIENT
Start: 2023-01-11

## 2023-01-11 RX ORDER — IPRATROPIUM BROMIDE AND ALBUTEROL SULFATE 2.5; .5 MG/3ML; MG/3ML
6 SOLUTION RESPIRATORY (INHALATION)
Status: COMPLETED | OUTPATIENT
Start: 2023-01-11 | End: 2023-01-11

## 2023-01-11 RX ORDER — PREDNISONE 20 MG/1
60 TABLET ORAL DAILY
Qty: 15 TABLET | Refills: 0 | Status: SHIPPED | OUTPATIENT
Start: 2023-01-11 | End: 2023-01-16

## 2023-01-11 RX ADMIN — METHYLPREDNISOLONE SODIUM SUCCINATE 125 MG: 125 INJECTION, POWDER, FOR SOLUTION INTRAMUSCULAR; INTRAVENOUS at 17:10

## 2023-01-11 RX ADMIN — IPRATROPIUM BROMIDE AND ALBUTEROL SULFATE 6 ML: .5; 2.5 SOLUTION RESPIRATORY (INHALATION) at 18:21

## 2023-01-11 ASSESSMENT — FIBROSIS 4 INDEX: FIB4 SCORE: 1.19

## 2023-01-12 NOTE — DISCHARGE INSTRUCTIONS
You were seen in the Emergency Department for shortness of breath.    EKG, labs, chest xray were completed without significant acute abnormalities.  COVID and flu testing were negative, RSV was positive.    Please use 1,000mg of tylenol or 600mg of ibuprofen every 6 hours as needed for pain.  Please use either inhaler or nebulizer every 4 hours for the next few days, take steroids as directed.    Please follow up with your primary care physician and cardiology as directed.    Return to the Emergency Department with worsening shortness of breath, chest pain, lightheadedness or fainting.

## 2023-01-12 NOTE — ED TRIAGE NOTES
Ambulates to triage  Chief Complaint   Patient presents with    Shortness of Breath     Low O2 x1 week, normally wears 2L at home and was 54%. Pt is now on 6L and is feeling better    Cough     X1 week, bringing up clear sputum     Pt has been using her inhalers and nebulizer with no relief, feels better with the increased O2. Denies any CP.

## 2023-01-12 NOTE — ED PROVIDER NOTES
ER Provider Note    Scribed for Susan Regan M.d. by Serene Oviedo. 1/11/2023  4:35 PM    Primary Care Provider: Lorna Desir P.A.-C.    CHIEF COMPLAINT  Chief Complaint   Patient presents with    Shortness of Breath     Low O2 x1 week, normally wears 2L at home and was 54%. Pt is now on 6L and is feeling better    Cough     X1 week, bringing up clear sputum     EXTERNAL RECORDS REVIEWED  Select: Outpatient Notes The patient has followed with the [facility]. She has a history of polycythemia vera, hypertension, and COPD. She had an echo in October 2021 that showed normal sytolic function, mild pulmonary hypertension, and borderline dialated ascending aorta    HPI/ROS  LIMITATION TO HISTORY   Select: : None  OUTSIDE HISTORIAN(S):  Select: none    Adri Maldonado is a 47 y.o. female who presents to the ED complaining of cough onset 1 week ago. Patient describes her mother visited her last week and had bronchitis at the time. Patient developed a cough 3 days later. She has associated shortness of breath and sputum production. Denies fevers or chest pain. She has a history of COPD and has been using her inhalers without improvement of her symptoms. She is chronically on 2 L of oxygen.     PAST MEDICAL HISTORY  Past Medical History:   Diagnosis Date    ASTHMA     Hypertension        SURGICAL HISTORY  History reviewed. No pertinent surgical history.    FAMILY HISTORY  History reviewed. No pertinent family history.    SOCIAL HISTORY   reports that she has quit smoking. Her smoking use included cigarettes. She has never used smokeless tobacco. She reports that she does not currently use alcohol. She reports that she does not use drugs.    CURRENT MEDICATIONS  Discharge Medication List as of 1/11/2023  8:05 PM        CONTINUE these medications which have NOT CHANGED    Details   carvedilol (COREG) 25 MG Tab TAKE 1 TABLET BY MOUTH 2 TIMES A DAY WITH MEALS. HOLD FOR SYSTOLIC BP <105 OR HEART RATE LESS  THAN 55., Disp-180 Tablet, R-3, Normal      cetirizine (ZYRTEC) 10 MG Tab Historical Med      felodipine (PLENDIL) 10 MG TABLET SR 24 HR Take 10 mg by mouth every day., Historical Med      furosemide (LASIX) 40 MG Tab Take 1 Tablet by mouth every day., Disp-90 Tablet, R-3, Normal      lisinopril (PRINIVIL) 20 MG Tab Take 20 mg by mouth 2 times a day., Historical Med      !! albuterol (PROVENTIL) 2.5mg/3ml Nebu Soln solution for nebulization Take 2.5 mg by nebulization every four hours as needed for Shortness of Breath., Historical Med       !! - Potential duplicate medications found. Please discuss with provider.          ALLERGIES  Patient has no known allergies.    PHYSICAL EXAM  BP (!) 183/112   Pulse 81   Temp 36.5 °C (97.7 °F) (Temporal)   Resp 20   Wt (!) 135 kg (297 lb 2.9 oz)   SpO2 96%   BMI 56.15 kg/m²   Constitutional: Nontoxic appearing. Alert in no apparent distress.  HENT: Normocephalic, Atraumatic. Bilateral external ears normal. Nose normal.  Moist mucous membranes.  Oropharynx clear.  Eyes: Pupils are equal and reactive. Conjunctiva normal.   Neck: Supple, full range of motion  Heart: Regular rate and rhythm.  No murmurs.    Lungs: No respiratory distress, normal work of breathing. Diminished breath sounds throughout expiratory wheezing  Abdomen Soft, no distention.  No tenderness to palpation.  Musculoskeletal: Atraumatic. No obvious deformities noted.  No lower extremity edema.  Skin: Warm, Dry.  No erythema, No rash.   Neurologic: Alert and oriented x3. Moving all extremities spontaneously without focal deficits.  Psychiatric: Affect normal, Mood normal, Appears appropriate and not intoxicated.     DIAGNOSTIC STUDIES    Labs:   Labs Reviewed   CBC WITH DIFFERENTIAL - Abnormal; Notable for the following components:       Result Value    RBC 5.53 (*)     Hematocrit 54.0 (*)     MCHC 29.6 (*)     RDW 53.3 (*)     All other components within normal limits   COMP METABOLIC PANEL - Abnormal;  Notable for the following components:    Chloride 89 (*)     Co2 45 (*)     Anion Gap 4.0 (*)     Glucose 142 (*)     Globulin 3.8 (*)     All other components within normal limits   COV-2, FLU A/B, AND RSV BY PCR (CEPHEID) - Abnormal; Notable for the following components:    RSV, PCR POSITIVE (*)     All other components within normal limits   CRP QUANTITIVE (NON-CARDIAC) - Abnormal; Notable for the following components:    Stat C-Reactive Protein 1.73 (*)     All other components within normal limits   VENOUS BLOOD GAS - Abnormal; Notable for the following components:    Venous Bg Pco2 74.1 (*)     Venous Bg Po2 53.9 (*)     Venous Bg Hco3 45 (*)     All other components within normal limits   PROBRAIN NATRIURETIC PEPTIDE, NT - Abnormal; Notable for the following components:    NT-proBNP 329 (*)     All other components within normal limits   LACTIC ACID   PROCALCITONIN   DIFFERENTIAL MANUAL   PERIPHERAL SMEAR REVIEW   PLATELET ESTIMATE   MORPHOLOGY   CORRECTED CALCIUM   ESTIMATED GFR   TROPONIN         EKG:   I have independently interpreted this EKG as above  Results for orders placed or performed during the hospital encounter of 23   EKG   Result Value Ref Range    Report       [facility]    Test Date:  2023  Pt Name:    DAMIEN VALENCIA                  Department: ER  MRN:        [id number]                      Room:  Gender:     Female                       Technician: [id number]  :        1975                   Requested By:ER TRIAGE PROTOCOL  Order #:    [id number]                    Reading MD: Susan Regan MD    Measurements  Intervals                                Axis  Rate:       80                           P:          49  UT:         192                          QRS:        75  QRSD:       89                           T:          64  QT:         376  QTc:        434    Interpretive Statements  Sinus rhythm  LAE, consider biatrial enlargement  Normal  intervals, no ectopy  No ST or T wave change  Compared to ECG 10/31/2021 17:00:52  No significant change from prior  Electronically Signed On 1- 17:02:56 PST by Susan Regan MD             Radiology:   The attending emergency physician has independently interpreted the diagnostic imaging associated with this visit and am waiting the final reading from the radiologist.   DX-CHEST-PORTABLE (1 VIEW)   Final Result      Cardiomegaly and mild vascular congestion/edema.            COURSE & MEDICAL DECISION MAKING   4:59 PM - Patient seen and examined at bedside. Discussed plan of care, including labs, imaging, and breathing treatments.  Patient agrees to the plan of care. The patient will be medicated with duo-neb and solu-medrol. Ordered for chest x-ray, CBC w/ diff, CMP, and EKG to evaluate her symptoms.     ED Observation Status? Yes; I am placing the patient in to an observation status due to a diagnostic uncertainty as well as therapeutic intensity. Patient placed in observation status at 4:59 PM, 1/11/2023.     INITIAL ASSESSMENT AND PLAN  Care Narrative: Patient with history of COPD who presents with increasing cough and shortness of breath over the last few days.  She is afebrile, hypertensive on arrival.  Oxygen levels low on room air however she is chronically on 2L at baseline.  She is wheezing to suggest COPD exacerbation however no respiratory distress.  EKG unchanged from prior without ischemia or arrhythmia.  Troponin normal, doubt ACS.  Chest xray with possible mild pulmonary edema however no pneumonia.  BNP is mildly elevated, patient scheduled to see cardiology next week for yearly repeat echo.  Labs without signs of sepsis.  She does have some mild hypercapnia however not acutely concerning. Viral testing positive for RSV.  Will treat with nebs and steroids followed by reassessment.     ADDITIONAL PROBLEM LIST AND DISPOSITION    Problem #1: Acute COPD exacerbation - improved after nebs and  steroids with no hypoxia on baseline oxygen, will discharge home with steroids.    Problem #2: RSV - supportive care    Problem #3: Hypercapnia - likely related to body habitus, no signs of altered mentation      Escalation of care considered, and ultimately not performed: after discussion with the patient / family, they have elected to decline an escalation in care. She would like to be discharged home and on baseline O2.     Decision tools and prescription drugs considered including, but not limited to: antibiotics however no signs of bacterial infection.    Upon reassessment, patient is resting comfortably with stable vital signs.  No new complaints at this time.  Discussed results with patient and/or family as well as importance of primary care/cardiology follow up.  Patient understands plan of care and strict return precautions for new or changing symptoms.         DISPOSITION:  Patient will be discharged home in stable condition.    FOLLOW UP:  Lorna Desir P.A.-C.  [address] [place] [id number] [phone]    Schedule an appointment as soon as possible for a visit       Cardiology as scheduled          [facility], Emergency Dept  [address] [place] [id number] [phone]    If symptoms worsen      OUTPATIENT MEDICATIONS:  Discharge Medication List as of 1/11/2023  8:05 PM        START taking these medications    Details   predniSONE (DELTASONE) 20 MG Tab Take 3 Tablets by mouth every day for 5 days., Disp-15 Tablet, R-0, Normal      !! albuterol (PROVENTIL) 2.5mg/3ml Nebu Soln solution for nebulization Take 3 mL by nebulization every four hours as needed for Shortness of Breath., Disp-30 Each, R-2, Normal       !! - Potential duplicate medications found. Please discuss with provider.              FINAL DIANGOSIS  1. Acute exacerbation of chronic obstructive pulmonary disease (COPD) (HCC)    2. RSV (respiratory syncytial virus infection)    3. Hypercapnia    4.  Panlobular emphysema (HCC)       Serene COWAN (Scribe), am scribing for, and in the presence of, Susan Regan M.D..    Electronically signed by: Serene Oviedo (Neil), 1/11/2023    Susan COWAN M.D. personally performed the services described in this documentation, as scribed by Serene Oviedo in my presence, and it is both accurate and complete.   The note accurately reflects work and decisions made by me.  Susan Regan M.D.  1/11/2023  8:57 PM

## 2023-01-12 NOTE — ED NOTES
Pt provided with discharge instructions. Pt verbalized understanding. PIV removed. Pt assisted out of ed on her home oxygen.

## 2024-10-10 ENCOUNTER — PATIENT MESSAGE (OUTPATIENT)
Dept: HEALTH INFORMATION MANAGEMENT | Facility: OTHER | Age: 49
End: 2024-10-10

## 2025-05-15 ENCOUNTER — OFFICE VISIT (OUTPATIENT)
Dept: URGENT CARE | Facility: PHYSICIAN GROUP | Age: 50
End: 2025-05-15
Payer: COMMERCIAL

## 2025-05-15 VITALS
TEMPERATURE: 97.4 F | WEIGHT: 254.6 LBS | BODY MASS INDEX: 42.42 KG/M2 | HEIGHT: 65 IN | DIASTOLIC BLOOD PRESSURE: 110 MMHG | HEART RATE: 114 BPM | OXYGEN SATURATION: 89 % | RESPIRATION RATE: 16 BRPM | SYSTOLIC BLOOD PRESSURE: 196 MMHG

## 2025-05-15 DIAGNOSIS — H61.22 IMPACTED CERUMEN OF LEFT EAR: Primary | ICD-10-CM

## 2025-05-15 DIAGNOSIS — R03.0 ELEVATED BLOOD PRESSURE READING: ICD-10-CM

## 2025-05-15 PROCEDURE — 3080F DIAST BP >= 90 MM HG: CPT | Performed by: FAMILY MEDICINE

## 2025-05-15 PROCEDURE — 99213 OFFICE O/P EST LOW 20 MIN: CPT | Performed by: FAMILY MEDICINE

## 2025-05-15 PROCEDURE — 3077F SYST BP >= 140 MM HG: CPT | Performed by: FAMILY MEDICINE

## 2025-05-15 ASSESSMENT — ENCOUNTER SYMPTOMS
NAUSEA: 0
EYE DISCHARGE: 0
MYALGIAS: 0
VOMITING: 0
WEIGHT LOSS: 0
EYE REDNESS: 0

## 2025-05-16 NOTE — PROGRESS NOTES
"Subjective     Adri Maldonado is a 49 y.o. female who presents with Ear Fullness (Can't heard from L ear )            1 day muffled hearing left ear after using swimmer's ear drops.  She has chronic right ear hearing loss.  No drainage from ear.  No pain.  She notes that she did not take her blood pressure medicine today.  No headache.  No vision change.  No unilateral weakness.  No chest pain.  No other aggravating alleviating factors.        Review of Systems   Constitutional:  Negative for malaise/fatigue and weight loss.   Eyes:  Negative for discharge and redness.   Gastrointestinal:  Negative for nausea and vomiting.   Musculoskeletal:  Negative for joint pain and myalgias.   Skin:  Negative for itching and rash.              Objective     BP (!) 196/110   Pulse (!) 114   Temp 36.3 °C (97.4 °F)   Resp 16   Ht 1.651 m (5' 5\")   Wt 115 kg (254 lb 9.6 oz)   SpO2 89%   BMI 42.37 kg/m²      Physical Exam  Constitutional:       General: She is not in acute distress.     Appearance: She is well-developed.   HENT:      Head: Normocephalic and atraumatic.      Right Ear: Tympanic membrane and ear canal normal.      Left Ear: Tympanic membrane and ear canal normal. There is impacted cerumen.      Ears:      Comments: Left cerumen impaction removed with lavage  Eyes:      Conjunctiva/sclera: Conjunctivae normal.   Cardiovascular:      Rate and Rhythm: Normal rate and regular rhythm.      Heart sounds: Normal heart sounds. No murmur heard.     Comments: Recheck heart rate 90s  Recheck blood pressure 170/100  Pulmonary:      Effort: Pulmonary effort is normal.      Breath sounds: Normal breath sounds. No wheezing.   Skin:     General: Skin is warm and dry.      Findings: No rash.   Neurological:      Mental Status: She is alert.              Assessment & Plan  Impacted cerumen of left ear         Elevated blood pressure reading         Differential diagnosis, natural history, supportive care, and indications for " immediate follow-up were discussed.     Will go home and take blood pressure medicine.  She will keep BP log and follow-up if it remains elevated.

## 2025-06-10 ENCOUNTER — APPOINTMENT (OUTPATIENT)
Dept: RADIOLOGY | Facility: MEDICAL CENTER | Age: 50
DRG: 271 | End: 2025-06-10
Attending: NURSE PRACTITIONER
Payer: COMMERCIAL

## 2025-06-10 ENCOUNTER — APPOINTMENT (OUTPATIENT)
Dept: RADIOLOGY | Facility: MEDICAL CENTER | Age: 50
DRG: 271 | End: 2025-06-10
Attending: EMERGENCY MEDICINE
Payer: COMMERCIAL

## 2025-06-10 ENCOUNTER — HOSPITAL ENCOUNTER (INPATIENT)
Facility: MEDICAL CENTER | Age: 50
LOS: 3 days | DRG: 271 | End: 2025-06-13
Attending: EMERGENCY MEDICINE | Admitting: INTERNAL MEDICINE
Payer: COMMERCIAL

## 2025-06-10 ENCOUNTER — ANESTHESIA EVENT (OUTPATIENT)
Dept: RADIOLOGY | Facility: MEDICAL CENTER | Age: 50
DRG: 271 | End: 2025-06-10
Payer: COMMERCIAL

## 2025-06-10 ENCOUNTER — ANESTHESIA (OUTPATIENT)
Dept: RADIOLOGY | Facility: MEDICAL CENTER | Age: 50
DRG: 271 | End: 2025-06-10
Payer: COMMERCIAL

## 2025-06-10 DIAGNOSIS — D75.1 POLYCYTHEMIA: ICD-10-CM

## 2025-06-10 DIAGNOSIS — R47.81 SLURRED SPEECH: ICD-10-CM

## 2025-06-10 DIAGNOSIS — I72.6 VERTEBRAL ARTERY ANEURYSM (HCC): ICD-10-CM

## 2025-06-10 DIAGNOSIS — I10 UNCONTROLLED HYPERTENSION: Primary | ICD-10-CM

## 2025-06-10 DIAGNOSIS — E11.9 TYPE 2 DIABETES MELLITUS WITHOUT COMPLICATION, WITHOUT LONG-TERM CURRENT USE OF INSULIN (HCC): ICD-10-CM

## 2025-06-10 PROBLEM — E66.813 CLASS 3 SEVERE OBESITY DUE TO EXCESS CALORIES WITHOUT SERIOUS COMORBIDITY WITH BODY MASS INDEX (BMI) OF 40.0 TO 44.9 IN ADULT: Status: ACTIVE | Noted: 2019-06-29

## 2025-06-10 PROBLEM — I16.1 HYPERTENSIVE EMERGENCY: Status: ACTIVE | Noted: 2025-06-10

## 2025-06-10 PROBLEM — I50.32 CHRONIC DIASTOLIC CONGESTIVE HEART FAILURE (HCC): Status: ACTIVE | Noted: 2025-06-10

## 2025-06-10 PROBLEM — I50.42 CHRONIC COMBINED SYSTOLIC AND DIASTOLIC CONGESTIVE HEART FAILURE (HCC): Status: ACTIVE | Noted: 2025-06-10

## 2025-06-10 LAB
ABO + RH BLD: NORMAL
ABO GROUP BLD: NORMAL
ALBUMIN SERPL BCP-MCNC: 3.4 G/DL (ref 3.2–4.9)
ALBUMIN/GLOB SERPL: 0.9 G/DL
ALP SERPL-CCNC: 102 U/L (ref 30–99)
ALT SERPL-CCNC: 26 U/L (ref 2–50)
ANION GAP SERPL CALC-SCNC: 9 MMOL/L (ref 7–16)
APTT PPP: 30.4 SEC (ref 24.7–36)
AST SERPL-CCNC: 31 U/L (ref 12–45)
BASOPHILS # BLD AUTO: 0.8 % (ref 0–1.8)
BASOPHILS # BLD: 0.06 K/UL (ref 0–0.12)
BILIRUB SERPL-MCNC: 0.8 MG/DL (ref 0.1–1.5)
BLD GP AB SCN SERPL QL: NORMAL
BUN SERPL-MCNC: 15 MG/DL (ref 8–22)
CALCIUM ALBUM COR SERPL-MCNC: 9.8 MG/DL (ref 8.5–10.5)
CALCIUM SERPL-MCNC: 9.3 MG/DL (ref 8.5–10.5)
CHLORIDE SERPL-SCNC: 98 MMOL/L (ref 96–112)
CHOLEST SERPL-MCNC: 251 MG/DL (ref 100–199)
CO2 SERPL-SCNC: 31 MMOL/L (ref 20–33)
CREAT SERPL-MCNC: 0.73 MG/DL (ref 0.5–1.4)
EKG IMPRESSION: NORMAL
EOSINOPHIL # BLD AUTO: 0.14 K/UL (ref 0–0.51)
EOSINOPHIL NFR BLD: 1.9 % (ref 0–6.9)
ERYTHROCYTE [DISTWIDTH] IN BLOOD BY AUTOMATED COUNT: 57.6 FL (ref 35.9–50)
EST. AVERAGE GLUCOSE BLD GHB EST-MCNC: 160 MG/DL
GFR SERPLBLD CREATININE-BSD FMLA CKD-EPI: 100 ML/MIN/1.73 M 2
GLOBULIN SER CALC-MCNC: 3.9 G/DL (ref 1.9–3.5)
GLUCOSE BLD STRIP.AUTO-MCNC: 150 MG/DL (ref 65–99)
GLUCOSE BLD STRIP.AUTO-MCNC: 229 MG/DL (ref 65–99)
GLUCOSE SERPL-MCNC: 136 MG/DL (ref 65–99)
HBA1C MFR BLD: 7.2 % (ref 4–5.6)
HCT VFR BLD AUTO: 67.2 % (ref 37–47)
HDLC SERPL-MCNC: 59 MG/DL
HGB BLD-MCNC: 21.3 G/DL (ref 12–16)
IMM GRANULOCYTES # BLD AUTO: 0.02 K/UL (ref 0–0.11)
IMM GRANULOCYTES NFR BLD AUTO: 0.3 % (ref 0–0.9)
INR PPP: 1.02 (ref 0.87–1.13)
LDLC SERPL CALC-MCNC: 163 MG/DL
LYMPHOCYTES # BLD AUTO: 1.61 K/UL (ref 1–4.8)
LYMPHOCYTES NFR BLD: 22.3 % (ref 22–41)
MCH RBC QN AUTO: 26.4 PG (ref 27–33)
MCHC RBC AUTO-ENTMCNC: 31.7 G/DL (ref 32.2–35.5)
MCV RBC AUTO: 83.2 FL (ref 81.4–97.8)
MONOCYTES # BLD AUTO: 0.51 K/UL (ref 0–0.85)
MONOCYTES NFR BLD AUTO: 7.1 % (ref 0–13.4)
NEUTROPHILS # BLD AUTO: 4.87 K/UL (ref 1.82–7.42)
NEUTROPHILS NFR BLD: 67.6 % (ref 44–72)
NRBC # BLD AUTO: 0 K/UL
NRBC BLD-RTO: 0 /100 WBC (ref 0–0.2)
PLATELET # BLD AUTO: 211 K/UL (ref 164–446)
PMV BLD AUTO: 9.9 FL (ref 9–12.9)
POTASSIUM SERPL-SCNC: 3.9 MMOL/L (ref 3.6–5.5)
PROT SERPL-MCNC: 7.3 G/DL (ref 6–8.2)
PROTHROMBIN TIME: 13.5 SEC (ref 12–14.6)
RBC # BLD AUTO: 8.08 M/UL (ref 4.2–5.4)
RH BLD: NORMAL
SODIUM SERPL-SCNC: 136 MMOL/L (ref 135–145)
SODIUM SERPL-SCNC: 138 MMOL/L (ref 135–145)
SODIUM SERPL-SCNC: 147 MMOL/L (ref 135–145)
TRIGL SERPL-MCNC: 143 MG/DL (ref 0–149)
TROPONIN T SERPL-MCNC: 16 NG/L (ref 6–19)
WBC # BLD AUTO: 7.2 K/UL (ref 4.8–10.8)

## 2025-06-10 PROCEDURE — 80053 COMPREHEN METABOLIC PANEL: CPT

## 2025-06-10 PROCEDURE — 99292 CRITICAL CARE ADDL 30 MIN: CPT | Performed by: INTERNAL MEDICINE

## 2025-06-10 PROCEDURE — 85025 COMPLETE CBC W/AUTO DIFF WBC: CPT

## 2025-06-10 PROCEDURE — 770022 HCHG ROOM/CARE - ICU (200)

## 2025-06-10 PROCEDURE — 700111 HCHG RX REV CODE 636 W/ 250 OVERRIDE (IP): Performed by: ANESTHESIOLOGY

## 2025-06-10 PROCEDURE — 99291 CRITICAL CARE FIRST HOUR: CPT | Performed by: INTERNAL MEDICINE

## 2025-06-10 PROCEDURE — 75894 X-RAYS TRANSCATH THERAPY: CPT

## 2025-06-10 PROCEDURE — 86900 BLOOD TYPING SEROLOGIC ABO: CPT | Mod: 91

## 2025-06-10 PROCEDURE — 86901 BLOOD TYPING SEROLOGIC RH(D): CPT

## 2025-06-10 PROCEDURE — 84484 ASSAY OF TROPONIN QUANT: CPT

## 2025-06-10 PROCEDURE — 160195 HCHG PACU COMPLEX - 1ST 60 MINS

## 2025-06-10 PROCEDURE — 85610 PROTHROMBIN TIME: CPT

## 2025-06-10 PROCEDURE — 84295 ASSAY OF SERUM SODIUM: CPT

## 2025-06-10 PROCEDURE — 700111 HCHG RX REV CODE 636 W/ 250 OVERRIDE (IP): Performed by: EMERGENCY MEDICINE

## 2025-06-10 PROCEDURE — 70496 CT ANGIOGRAPHY HEAD: CPT

## 2025-06-10 PROCEDURE — 80061 LIPID PANEL: CPT

## 2025-06-10 PROCEDURE — 700117 HCHG RX CONTRAST REV CODE 255: Performed by: EMERGENCY MEDICINE

## 2025-06-10 PROCEDURE — 700111 HCHG RX REV CODE 636 W/ 250 OVERRIDE (IP): Performed by: INTERNAL MEDICINE

## 2025-06-10 PROCEDURE — 93005 ELECTROCARDIOGRAM TRACING: CPT | Mod: TC | Performed by: EMERGENCY MEDICINE

## 2025-06-10 PROCEDURE — 94760 N-INVAS EAR/PLS OXIMETRY 1: CPT

## 2025-06-10 PROCEDURE — 61624 TCAT PERM OCCLS/EMBOLJ CNS: CPT

## 2025-06-10 PROCEDURE — 700101 HCHG RX REV CODE 250: Performed by: EMERGENCY MEDICINE

## 2025-06-10 PROCEDURE — 96365 THER/PROPH/DIAG IV INF INIT: CPT

## 2025-06-10 PROCEDURE — 83036 HEMOGLOBIN GLYCOSYLATED A1C: CPT

## 2025-06-10 PROCEDURE — 86850 RBC ANTIBODY SCREEN: CPT

## 2025-06-10 PROCEDURE — 70498 CT ANGIOGRAPHY NECK: CPT

## 2025-06-10 PROCEDURE — 75898 FOLLOW-UP ANGIOGRAPHY: CPT

## 2025-06-10 PROCEDURE — 700101 HCHG RX REV CODE 250: Performed by: INTERNAL MEDICINE

## 2025-06-10 PROCEDURE — 96366 THER/PROPH/DIAG IV INF ADDON: CPT

## 2025-06-10 PROCEDURE — 82962 GLUCOSE BLOOD TEST: CPT | Performed by: INTERNAL MEDICINE

## 2025-06-10 PROCEDURE — A9270 NON-COVERED ITEM OR SERVICE: HCPCS | Performed by: INTERNAL MEDICINE

## 2025-06-10 PROCEDURE — 160002 HCHG RECOVERY MINUTES (STAT)

## 2025-06-10 PROCEDURE — 99291 CRITICAL CARE FIRST HOUR: CPT

## 2025-06-10 PROCEDURE — 700111 HCHG RX REV CODE 636 W/ 250 OVERRIDE (IP): Mod: JZ | Performed by: INTERNAL MEDICINE

## 2025-06-10 PROCEDURE — 03VG3HZ RESTRICTION OF INTRACRANIAL ARTERY WITH INTRALUMINAL DEVICE, FLOW DIVERTER, PERCUTANEOUS APPROACH: ICD-10-PCS | Performed by: RADIOLOGY

## 2025-06-10 PROCEDURE — 0042T CT-CEREBRAL PERFUSION ANALYSIS: CPT

## 2025-06-10 PROCEDURE — C1760 CLOSURE DEV, VASC: HCPCS

## 2025-06-10 PROCEDURE — 71045 X-RAY EXAM CHEST 1 VIEW: CPT

## 2025-06-10 PROCEDURE — 700101 HCHG RX REV CODE 250: Performed by: ANESTHESIOLOGY

## 2025-06-10 PROCEDURE — 99255 IP/OBS CONSLTJ NEW/EST HI 80: CPT | Performed by: NURSE PRACTITIONER

## 2025-06-10 PROCEDURE — 700105 HCHG RX REV CODE 258: Performed by: EMERGENCY MEDICINE

## 2025-06-10 PROCEDURE — 700105 HCHG RX REV CODE 258: Performed by: INTERNAL MEDICINE

## 2025-06-10 PROCEDURE — 700102 HCHG RX REV CODE 250 W/ 637 OVERRIDE(OP): Performed by: INTERNAL MEDICINE

## 2025-06-10 PROCEDURE — 93005 ELECTROCARDIOGRAM TRACING: CPT | Mod: TC

## 2025-06-10 PROCEDURE — 36415 COLL VENOUS BLD VENIPUNCTURE: CPT

## 2025-06-10 PROCEDURE — 96376 TX/PRO/DX INJ SAME DRUG ADON: CPT

## 2025-06-10 PROCEDURE — 700105 HCHG RX REV CODE 258: Performed by: ANESTHESIOLOGY

## 2025-06-10 PROCEDURE — 700111 HCHG RX REV CODE 636 W/ 250 OVERRIDE (IP): Mod: JZ

## 2025-06-10 PROCEDURE — 70450 CT HEAD/BRAIN W/O DYE: CPT

## 2025-06-10 PROCEDURE — 76937 US GUIDE VASCULAR ACCESS: CPT

## 2025-06-10 PROCEDURE — 85730 THROMBOPLASTIN TIME PARTIAL: CPT

## 2025-06-10 PROCEDURE — 96375 TX/PRO/DX INJ NEW DRUG ADDON: CPT

## 2025-06-10 RX ORDER — ONDANSETRON 2 MG/ML
INJECTION INTRAMUSCULAR; INTRAVENOUS PRN
Status: DISCONTINUED | OUTPATIENT
Start: 2025-06-10 | End: 2025-06-11 | Stop reason: SURG

## 2025-06-10 RX ORDER — LIDOCAINE HYDROCHLORIDE 20 MG/ML
INJECTION, SOLUTION EPIDURAL; INFILTRATION; INTRACAUDAL; PERINEURAL PRN
Status: DISCONTINUED | OUTPATIENT
Start: 2025-06-10 | End: 2025-06-10

## 2025-06-10 RX ORDER — HALOPERIDOL 5 MG/ML
1 INJECTION INTRAMUSCULAR
Status: DISCONTINUED | OUTPATIENT
Start: 2025-06-10 | End: 2025-06-10 | Stop reason: HOSPADM

## 2025-06-10 RX ORDER — HEPARIN SODIUM 1000 [USP'U]/ML
INJECTION, SOLUTION INTRAVENOUS; SUBCUTANEOUS
Status: COMPLETED
Start: 2025-06-10 | End: 2025-06-10

## 2025-06-10 RX ORDER — LABETALOL HYDROCHLORIDE 5 MG/ML
10-20 INJECTION, SOLUTION INTRAVENOUS EVERY 4 HOURS PRN
Status: DISCONTINUED | OUTPATIENT
Start: 2025-06-10 | End: 2025-06-13 | Stop reason: HOSPADM

## 2025-06-10 RX ORDER — IPRATROPIUM BROMIDE AND ALBUTEROL SULFATE 2.5; .5 MG/3ML; MG/3ML
3 SOLUTION RESPIRATORY (INHALATION)
Status: DISCONTINUED | OUTPATIENT
Start: 2025-06-10 | End: 2025-06-10 | Stop reason: HOSPADM

## 2025-06-10 RX ORDER — MIDAZOLAM HYDROCHLORIDE 1 MG/ML
1-5 INJECTION INTRAMUSCULAR; INTRAVENOUS
Status: DISCONTINUED | OUTPATIENT
Start: 2025-06-10 | End: 2025-06-11

## 2025-06-10 RX ORDER — ASPIRIN 81 MG/1
81 TABLET, CHEWABLE ORAL DAILY
Status: DISCONTINUED | OUTPATIENT
Start: 2025-06-11 | End: 2025-06-13 | Stop reason: HOSPADM

## 2025-06-10 RX ORDER — CARVEDILOL 25 MG/1
25 TABLET ORAL 2 TIMES DAILY WITH MEALS
COMMUNITY

## 2025-06-10 RX ORDER — LABETALOL HYDROCHLORIDE 5 MG/ML
10 INJECTION, SOLUTION INTRAVENOUS ONCE
Status: COMPLETED | OUTPATIENT
Start: 2025-06-10 | End: 2025-06-10

## 2025-06-10 RX ORDER — MEPERIDINE HYDROCHLORIDE 25 MG/ML
12.5 INJECTION INTRAMUSCULAR; INTRAVENOUS; SUBCUTANEOUS
Status: DISCONTINUED | OUTPATIENT
Start: 2025-06-10 | End: 2025-06-10 | Stop reason: HOSPADM

## 2025-06-10 RX ORDER — MIDAZOLAM HYDROCHLORIDE 1 MG/ML
1 INJECTION INTRAMUSCULAR; INTRAVENOUS
Status: DISCONTINUED | OUTPATIENT
Start: 2025-06-10 | End: 2025-06-10 | Stop reason: HOSPADM

## 2025-06-10 RX ORDER — EPTIFIBATIDE 0.75 MG/ML
2 INJECTION, SOLUTION INTRAVENOUS CONTINUOUS
Status: DISPENSED | OUTPATIENT
Start: 2025-06-10 | End: 2025-06-10

## 2025-06-10 RX ORDER — SODIUM CHLORIDE 9 MG/ML
INJECTION, SOLUTION INTRAVENOUS CONTINUOUS
Status: DISCONTINUED | OUTPATIENT
Start: 2025-06-10 | End: 2025-06-11

## 2025-06-10 RX ORDER — DEXAMETHASONE SODIUM PHOSPHATE 4 MG/ML
INJECTION, SOLUTION INTRA-ARTICULAR; INTRALESIONAL; INTRAMUSCULAR; INTRAVENOUS; SOFT TISSUE PRN
Status: DISCONTINUED | OUTPATIENT
Start: 2025-06-10 | End: 2025-06-11 | Stop reason: SURG

## 2025-06-10 RX ORDER — POLYETHYLENE GLYCOL 3350 17 G/17G
1 POWDER, FOR SOLUTION ORAL
Status: DISCONTINUED | OUTPATIENT
Start: 2025-06-10 | End: 2025-06-13 | Stop reason: HOSPADM

## 2025-06-10 RX ORDER — EPTIFIBATIDE 0.75 MG/ML
INJECTION, SOLUTION INTRAVENOUS
Status: COMPLETED
Start: 2025-06-10 | End: 2025-06-10

## 2025-06-10 RX ORDER — ASPIRIN 325 MG
325 TABLET ORAL ONCE
Status: COMPLETED | OUTPATIENT
Start: 2025-06-10 | End: 2025-06-10

## 2025-06-10 RX ORDER — FELODIPINE 10 MG/1
10 TABLET, EXTENDED RELEASE ORAL DAILY
Status: ON HOLD | COMMUNITY
End: 2025-06-11

## 2025-06-10 RX ORDER — OXYCODONE HCL 5 MG/5 ML
10 SOLUTION, ORAL ORAL
Status: DISCONTINUED | OUTPATIENT
Start: 2025-06-10 | End: 2025-06-10 | Stop reason: HOSPADM

## 2025-06-10 RX ORDER — AMLODIPINE BESYLATE 5 MG/1
2.5 TABLET ORAL
Status: DISCONTINUED | OUTPATIENT
Start: 2025-06-10 | End: 2025-06-11

## 2025-06-10 RX ORDER — POTASSIUM CHLORIDE 1.5 G/1.58G
20 POWDER, FOR SOLUTION ORAL DAILY
COMMUNITY

## 2025-06-10 RX ORDER — SODIUM CHLORIDE, SODIUM LACTATE, POTASSIUM CHLORIDE, CALCIUM CHLORIDE 600; 310; 30; 20 MG/100ML; MG/100ML; MG/100ML; MG/100ML
INJECTION, SOLUTION INTRAVENOUS
Status: DISCONTINUED | OUTPATIENT
Start: 2025-06-10 | End: 2025-06-11 | Stop reason: SURG

## 2025-06-10 RX ORDER — METOPROLOL SUCCINATE 100 MG/1
100 TABLET, EXTENDED RELEASE ORAL DAILY
Status: ON HOLD | COMMUNITY
End: 2025-06-11

## 2025-06-10 RX ORDER — HYDRALAZINE HYDROCHLORIDE 20 MG/ML
10 INJECTION INTRAMUSCULAR; INTRAVENOUS EVERY 4 HOURS PRN
Status: DISCONTINUED | OUTPATIENT
Start: 2025-06-10 | End: 2025-06-13 | Stop reason: HOSPADM

## 2025-06-10 RX ORDER — FUROSEMIDE 40 MG/1
40 TABLET ORAL DAILY
COMMUNITY

## 2025-06-10 RX ORDER — HYDROCHLOROTHIAZIDE 25 MG/1
25 TABLET ORAL DAILY
Status: ON HOLD | COMMUNITY
End: 2025-06-11

## 2025-06-10 RX ORDER — ACETAMINOPHEN 500 MG
1000 TABLET ORAL
COMMUNITY

## 2025-06-10 RX ORDER — HYDROMORPHONE HYDROCHLORIDE 1 MG/ML
0.4 INJECTION, SOLUTION INTRAMUSCULAR; INTRAVENOUS; SUBCUTANEOUS
Status: DISCONTINUED | OUTPATIENT
Start: 2025-06-10 | End: 2025-06-10 | Stop reason: HOSPADM

## 2025-06-10 RX ORDER — TICAGRELOR 90 MG/1
90 TABLET, FILM COATED ORAL 2 TIMES DAILY
Status: DISCONTINUED | OUTPATIENT
Start: 2025-06-11 | End: 2025-06-13 | Stop reason: HOSPADM

## 2025-06-10 RX ORDER — HYDRALAZINE HYDROCHLORIDE 20 MG/ML
10 INJECTION INTRAMUSCULAR; INTRAVENOUS EVERY 4 HOURS PRN
Status: DISCONTINUED | OUTPATIENT
Start: 2025-06-10 | End: 2025-06-10

## 2025-06-10 RX ORDER — INSULIN LISPRO 100 [IU]/ML
1-6 INJECTION, SOLUTION INTRAVENOUS; SUBCUTANEOUS
Status: DISCONTINUED | OUTPATIENT
Start: 2025-06-10 | End: 2025-06-13 | Stop reason: HOSPADM

## 2025-06-10 RX ORDER — LISINOPRIL 20 MG/1
20 TABLET ORAL DAILY
Status: ON HOLD | COMMUNITY
End: 2025-06-11

## 2025-06-10 RX ORDER — ACETAMINOPHEN 325 MG/1
650 TABLET ORAL EVERY 4 HOURS PRN
Status: DISCONTINUED | OUTPATIENT
Start: 2025-06-10 | End: 2025-06-13 | Stop reason: HOSPADM

## 2025-06-10 RX ORDER — LIDOCAINE HYDROCHLORIDE 40 MG/ML
SOLUTION TOPICAL PRN
Status: DISCONTINUED | OUTPATIENT
Start: 2025-06-10 | End: 2025-06-11 | Stop reason: SURG

## 2025-06-10 RX ORDER — EPTIFIBATIDE 2 MG/ML
INJECTION, SOLUTION INTRAVENOUS
Status: COMPLETED
Start: 2025-06-10 | End: 2025-06-10

## 2025-06-10 RX ORDER — HEPARIN SODIUM,PORCINE 1000/ML
VIAL (ML) INJECTION PRN
Status: DISCONTINUED | OUTPATIENT
Start: 2025-06-10 | End: 2025-06-11 | Stop reason: SURG

## 2025-06-10 RX ORDER — KETOROLAC TROMETHAMINE 15 MG/ML
INJECTION, SOLUTION INTRAMUSCULAR; INTRAVENOUS PRN
Status: DISCONTINUED | OUTPATIENT
Start: 2025-06-10 | End: 2025-06-11 | Stop reason: SURG

## 2025-06-10 RX ORDER — DEXTROSE MONOHYDRATE 25 G/50ML
25 INJECTION, SOLUTION INTRAVENOUS
Status: DISCONTINUED | OUTPATIENT
Start: 2025-06-10 | End: 2025-06-13 | Stop reason: HOSPADM

## 2025-06-10 RX ORDER — AMOXICILLIN 250 MG
2 CAPSULE ORAL EVERY EVENING
Status: DISCONTINUED | OUTPATIENT
Start: 2025-06-10 | End: 2025-06-13 | Stop reason: HOSPADM

## 2025-06-10 RX ORDER — MIDAZOLAM HYDROCHLORIDE 1 MG/ML
INJECTION INTRAMUSCULAR; INTRAVENOUS
Status: COMPLETED
Start: 2025-06-10 | End: 2025-06-10

## 2025-06-10 RX ORDER — HYDROMORPHONE HYDROCHLORIDE 1 MG/ML
1 INJECTION, SOLUTION INTRAMUSCULAR; INTRAVENOUS; SUBCUTANEOUS
Status: DISCONTINUED | OUTPATIENT
Start: 2025-06-10 | End: 2025-06-10 | Stop reason: HOSPADM

## 2025-06-10 RX ORDER — CARVEDILOL 3.12 MG/1
3.12 TABLET ORAL 2 TIMES DAILY WITH MEALS
Status: DISCONTINUED | OUTPATIENT
Start: 2025-06-10 | End: 2025-06-11

## 2025-06-10 RX ORDER — ENALAPRILAT 1.25 MG/ML
1.25 INJECTION INTRAVENOUS EVERY 6 HOURS PRN
Status: DISCONTINUED | OUTPATIENT
Start: 2025-06-10 | End: 2025-06-13 | Stop reason: HOSPADM

## 2025-06-10 RX ORDER — ONDANSETRON 4 MG/1
4 TABLET, ORALLY DISINTEGRATING ORAL EVERY 4 HOURS PRN
Status: DISCONTINUED | OUTPATIENT
Start: 2025-06-10 | End: 2025-06-13 | Stop reason: HOSPADM

## 2025-06-10 RX ORDER — VERAPAMIL HYDROCHLORIDE 2.5 MG/ML
INJECTION INTRAVENOUS
Status: DISPENSED
Start: 2025-06-10 | End: 2025-06-10

## 2025-06-10 RX ORDER — LISINOPRIL 5 MG/1
5 TABLET ORAL
Status: DISCONTINUED | OUTPATIENT
Start: 2025-06-10 | End: 2025-06-12

## 2025-06-10 RX ORDER — PHENYLEPHRINE HYDROCHLORIDE 10 MG/ML
INJECTION, SOLUTION INTRAMUSCULAR; INTRAVENOUS; SUBCUTANEOUS PRN
Status: DISCONTINUED | OUTPATIENT
Start: 2025-06-10 | End: 2025-06-11 | Stop reason: SURG

## 2025-06-10 RX ORDER — EPTIFIBATIDE 2 MG/ML
180 INJECTION, SOLUTION INTRAVENOUS ONCE
Status: COMPLETED | OUTPATIENT
Start: 2025-06-10 | End: 2025-06-10

## 2025-06-10 RX ORDER — ENALAPRILAT 1.25 MG/ML
1.25 INJECTION INTRAVENOUS EVERY 6 HOURS PRN
Status: DISCONTINUED | OUTPATIENT
Start: 2025-06-10 | End: 2025-06-10

## 2025-06-10 RX ORDER — LABETALOL HYDROCHLORIDE 5 MG/ML
10-20 INJECTION, SOLUTION INTRAVENOUS EVERY 4 HOURS PRN
Status: DISCONTINUED | OUTPATIENT
Start: 2025-06-10 | End: 2025-06-10

## 2025-06-10 RX ORDER — LIDOCAINE HYDROCHLORIDE 40 MG/ML
SOLUTION TOPICAL
Status: COMPLETED
Start: 2025-06-10 | End: 2025-06-10

## 2025-06-10 RX ORDER — ONDANSETRON 2 MG/ML
4 INJECTION INTRAMUSCULAR; INTRAVENOUS
Status: DISCONTINUED | OUTPATIENT
Start: 2025-06-10 | End: 2025-06-10 | Stop reason: HOSPADM

## 2025-06-10 RX ORDER — LIDOCAINE HYDROCHLORIDE 20 MG/ML
INJECTION, SOLUTION EPIDURAL; INFILTRATION; INTRACAUDAL; PERINEURAL PRN
Status: DISCONTINUED | OUTPATIENT
Start: 2025-06-10 | End: 2025-06-11 | Stop reason: SURG

## 2025-06-10 RX ORDER — ONDANSETRON 2 MG/ML
4 INJECTION INTRAMUSCULAR; INTRAVENOUS EVERY 4 HOURS PRN
Status: DISCONTINUED | OUTPATIENT
Start: 2025-06-10 | End: 2025-06-13 | Stop reason: HOSPADM

## 2025-06-10 RX ORDER — ASPIRIN 81 MG/1
81 TABLET ORAL DAILY
Status: ON HOLD | COMMUNITY
End: 2025-06-11

## 2025-06-10 RX ORDER — TICAGRELOR 90 MG/1
180 TABLET, FILM COATED ORAL ONCE
Status: COMPLETED | OUTPATIENT
Start: 2025-06-10 | End: 2025-06-10

## 2025-06-10 RX ORDER — AMLODIPINE BESYLATE 10 MG/1
10 TABLET ORAL DAILY
COMMUNITY

## 2025-06-10 RX ORDER — ACETAMINOPHEN 650 MG/1
650 SUPPOSITORY RECTAL EVERY 4 HOURS PRN
Status: DISCONTINUED | OUTPATIENT
Start: 2025-06-10 | End: 2025-06-13 | Stop reason: HOSPADM

## 2025-06-10 RX ORDER — DIPHENHYDRAMINE HYDROCHLORIDE 50 MG/ML
12.5 INJECTION, SOLUTION INTRAMUSCULAR; INTRAVENOUS
Status: DISCONTINUED | OUTPATIENT
Start: 2025-06-10 | End: 2025-06-10 | Stop reason: HOSPADM

## 2025-06-10 RX ORDER — OXYCODONE HCL 5 MG/5 ML
5 SOLUTION, ORAL ORAL
Status: DISCONTINUED | OUTPATIENT
Start: 2025-06-10 | End: 2025-06-10 | Stop reason: HOSPADM

## 2025-06-10 RX ORDER — HYDROMORPHONE HYDROCHLORIDE 1 MG/ML
0.2 INJECTION, SOLUTION INTRAMUSCULAR; INTRAVENOUS; SUBCUTANEOUS
Status: DISCONTINUED | OUTPATIENT
Start: 2025-06-10 | End: 2025-06-10 | Stop reason: HOSPADM

## 2025-06-10 RX ADMIN — PROPOFOL 120 MG: 10 INJECTION, EMULSION INTRAVENOUS at 12:29

## 2025-06-10 RX ADMIN — SODIUM CHLORIDE: 9 INJECTION, SOLUTION INTRAVENOUS at 15:58

## 2025-06-10 RX ADMIN — HEPARIN SODIUM 5000 UNITS: 1000 INJECTION, SOLUTION INTRAVENOUS; SUBCUTANEOUS at 13:14

## 2025-06-10 RX ADMIN — LABETALOL HYDROCHLORIDE 10 MG: 5 INJECTION, SOLUTION INTRAVENOUS at 09:52

## 2025-06-10 RX ADMIN — LIDOCAINE HYDROCHLORIDE 50 MG: 20 INJECTION, SOLUTION EPIDURAL; INFILTRATION; INTRACAUDAL; PERINEURAL at 12:29

## 2025-06-10 RX ADMIN — IOHEXOL 120 ML: 350 INJECTION, SOLUTION INTRAVENOUS at 09:15

## 2025-06-10 RX ADMIN — SODIUM CHLORIDE, POTASSIUM CHLORIDE, SODIUM LACTATE AND CALCIUM CHLORIDE: 600; 310; 30; 20 INJECTION, SOLUTION INTRAVENOUS at 12:22

## 2025-06-10 RX ADMIN — DEXAMETHASONE SODIUM PHOSPHATE 8 MG: 4 INJECTION INTRA-ARTICULAR; INTRALESIONAL; INTRAMUSCULAR; INTRAVENOUS; SOFT TISSUE at 13:09

## 2025-06-10 RX ADMIN — LIDOCAINE HYDROCHLORIDE 4 ML: 40 SOLUTION TOPICAL at 12:29

## 2025-06-10 RX ADMIN — SODIUM CHLORIDE 10 MG/HR: 9 INJECTION, SOLUTION INTRAVENOUS at 14:52

## 2025-06-10 RX ADMIN — ROCURONIUM BROMIDE 80 MG: 10 INJECTION, SOLUTION INTRAVENOUS at 12:47

## 2025-06-10 RX ADMIN — EPTIFIBATIDE 2 MCG/KG/MIN: 0.75 INJECTION, SOLUTION INTRAVENOUS at 13:37

## 2025-06-10 RX ADMIN — CARVEDILOL 3.12 MG: 3.12 TABLET, FILM COATED ORAL at 17:41

## 2025-06-10 RX ADMIN — LISINOPRIL 5 MG: 5 TABLET ORAL at 16:03

## 2025-06-10 RX ADMIN — SODIUM CHLORIDE 10 MG/HR: 9 INJECTION, SOLUTION INTRAVENOUS at 14:00

## 2025-06-10 RX ADMIN — PHENYLEPHRINE HYDROCHLORIDE 100 MCG: 10 INJECTION INTRAVENOUS at 13:20

## 2025-06-10 RX ADMIN — ASPIRIN 325 MG: 325 TABLET ORAL at 17:41

## 2025-06-10 RX ADMIN — DOCUSATE SODIUM 50 MG AND SENNOSIDES 8.6 MG 2 TABLET: 8.6; 5 TABLET, FILM COATED ORAL at 17:40

## 2025-06-10 RX ADMIN — SODIUM CHLORIDE 5 MG/HR: 9 INJECTION, SOLUTION INTRAVENOUS at 10:09

## 2025-06-10 RX ADMIN — TICAGRELOR 180 MG: 90 TABLET ORAL at 17:41

## 2025-06-10 RX ADMIN — Medication 140 MG: at 12:29

## 2025-06-10 RX ADMIN — PHENYLEPHRINE HYDROCHLORIDE 100 MCG: 10 INJECTION INTRAVENOUS at 13:03

## 2025-06-10 RX ADMIN — PHENYLEPHRINE HYDROCHLORIDE 100 MCG: 10 INJECTION INTRAVENOUS at 13:10

## 2025-06-10 RX ADMIN — SODIUM CHLORIDE 7.5 MG/HR: 9 INJECTION, SOLUTION INTRAVENOUS at 20:56

## 2025-06-10 RX ADMIN — SODIUM CHLORIDE 10 MG/HR: 9 INJECTION, SOLUTION INTRAVENOUS at 17:51

## 2025-06-10 RX ADMIN — PHENYLEPHRINE HYDROCHLORIDE 200 MCG: 10 INJECTION INTRAVENOUS at 12:55

## 2025-06-10 RX ADMIN — PHENYLEPHRINE HYDROCHLORIDE 100 MCG: 10 INJECTION INTRAVENOUS at 12:48

## 2025-06-10 RX ADMIN — ONDANSETRON 4 MG: 2 INJECTION INTRAMUSCULAR; INTRAVENOUS at 13:55

## 2025-06-10 RX ADMIN — PHENYLEPHRINE HYDROCHLORIDE 200 MCG: 10 INJECTION INTRAVENOUS at 12:51

## 2025-06-10 RX ADMIN — EPTIFIBATIDE 20000 MCG: 2 INJECTION, SOLUTION INTRAVENOUS at 13:36

## 2025-06-10 RX ADMIN — AMLODIPINE BESYLATE 2.5 MG: 5 TABLET ORAL at 16:03

## 2025-06-10 RX ADMIN — SUGAMMADEX 200 MG: 100 INJECTION, SOLUTION INTRAVENOUS at 13:55

## 2025-06-10 RX ADMIN — LABETALOL HYDROCHLORIDE 10 MG: 5 INJECTION, SOLUTION INTRAVENOUS at 17:57

## 2025-06-10 RX ADMIN — EPTIFIBATIDE 2 MCG/KG/MIN: 0.75 INJECTION, SOLUTION INTRAVENOUS at 17:40

## 2025-06-10 RX ADMIN — MIDAZOLAM HYDROCHLORIDE 2 MG: 1 INJECTION, SOLUTION INTRAMUSCULAR; INTRAVENOUS at 12:25

## 2025-06-10 RX ADMIN — LABETALOL HYDROCHLORIDE 10 MG: 5 INJECTION, SOLUTION INTRAVENOUS at 09:13

## 2025-06-10 RX ADMIN — INSULIN LISPRO 2 UNITS: 100 INJECTION, SOLUTION INTRAVENOUS; SUBCUTANEOUS at 20:02

## 2025-06-10 RX ADMIN — KETOROLAC TROMETHAMINE 15 MG: 15 INJECTION, SOLUTION INTRAMUSCULAR; INTRAVENOUS at 13:55

## 2025-06-10 ASSESSMENT — ENCOUNTER SYMPTOMS
LOSS OF CONSCIOUSNESS: 0
CHILLS: 0
SENSORY CHANGE: 0
SORE THROAT: 0
DIZZINESS: 0
VOMITING: 0
NAUSEA: 0
FOCAL WEAKNESS: 0
FEVER: 0
BRUISES/BLEEDS EASILY: 0
BACK PAIN: 0
COUGH: 0
NERVOUS/ANXIOUS: 0
DEPRESSION: 0
BLURRED VISION: 0
MYALGIAS: 0
ABDOMINAL PAIN: 0
SPUTUM PRODUCTION: 0
SHORTNESS OF BREATH: 0
HEADACHES: 1
SPEECH CHANGE: 1
SEIZURES: 0
PALPITATIONS: 0

## 2025-06-10 ASSESSMENT — COPD QUESTIONNAIRES
DO YOU EVER COUGH UP ANY MUCUS OR PHLEGM?: NO/ONLY WITH OCCASIONAL COLDS OR INFECTIONS
DURING THE PAST 4 WEEKS HOW MUCH DID YOU FEEL SHORT OF BREATH: SOME OF THE TIME
HAVE YOU SMOKED AT LEAST 100 CIGARETTES IN YOUR ENTIRE LIFE: YES
COPD SCREENING SCORE: 4

## 2025-06-10 ASSESSMENT — PAIN DESCRIPTION - PAIN TYPE
TYPE: ACUTE PAIN

## 2025-06-10 ASSESSMENT — FIBROSIS 4 INDEX: FIB4 SCORE: 1.44

## 2025-06-10 NOTE — ANESTHESIA PROCEDURE NOTES
Arterial Line    Performed by: Iker Harris III, M.D.  Authorized by: Iker Harris III, M.D.    Start Time:  6/10/2025 12:38 PM  End Time:  6/10/2025 12:39 PM  Localization: surface landmarks    Patient Location:  OR  Indication: continuous blood pressure monitoring        Catheter Size:  20 G  Seldinger Technique?: Yes    Laterality:  Left  Site:  Radial artery  Line Secured:  Antimicrobial disc, tape and transparent dressing  Events: patient tolerated procedure well with no complications

## 2025-06-10 NOTE — CONSULTS
"Critical Care Consultation    Date of consult: 6/10/2025    Referring Physician  Indy Lester M.D.    Reason for Consultation  HTN emergency and vertebral artery aneurysm    History of Presenting Illness  49 y.o. female who presented 6/10/2025 with a past medical history significant for non-insulin-dependent diabetes, congestive heart failure, hypertension, and asthma who has been off of her medications for 1 year now without medical consultation because of \"the way they made me feel\" and because she lost 50 pounds with dietary modification.  She was brought in by EMS from home this morning for high blood pressure and a headache slurred speech.  She awoke around 2 or 3 AM noting the headache but went back to sleep and around 6 this morning she awoke again with ongoing headache and now with difficulty speaking.  She attempted to call her  on the phone who was headed to work and he noted that she was having a difficult time speaking and called EMS.  She was found to be hypertensive upon arrival and in the ED was given labetalol x 2 before being started on a nicardipine drip.  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  Neurology and neuro IR were consulted and plans are to take her for embolization of the aneurysm today.  I was consulted for critical care management.  Upon arrival to the ED, her slurred speech had nearly resolved but she continues to have a headache and hypertension.    Code Status  Full Code    Review of Systems  Review of Systems   Constitutional:  Negative for chills, fever and malaise/fatigue.   HENT:  Negative for congestion and sore throat.    Eyes:  Negative for blurred vision.   Respiratory:  Negative for cough, sputum production and shortness of breath.    Cardiovascular:  Negative for chest pain, palpitations and leg swelling.   Gastrointestinal:  Negative for abdominal pain, nausea and vomiting.   Genitourinary:  " Negative for dysuria.   Musculoskeletal:  Negative for back pain and myalgias.   Skin:  Negative for rash.   Neurological:  Positive for speech change and headaches. Negative for dizziness, sensory change, focal weakness, seizures and loss of consciousness.   Endo/Heme/Allergies:  Does not bruise/bleed easily.   Psychiatric/Behavioral:  Negative for depression. The patient is not nervous/anxious.    All other systems reviewed and are negative.      Past Medical History   has a past medical history of ASTHMA and Hypertension.  Congestive heart failure, non-insulin-dependent diabetes    Surgical History   has no past surgical history on file.  None    Family History  family history is not on file.  Hypertension    Social History   reports that she has quit smoking. Her smoking use included cigarettes. She has never used smokeless tobacco. She reports that she does not currently use alcohol. She reports that she does not use drugs.    Medications  Home Medications       Reviewed by Rukhsana Menjivar (Pharmacy Tech) on 06/10/25 at 1053  Med List Status: Complete     Medication Last Dose Status   acetaminophen (TYLENOL) 500 MG Tab 6/10/2025 Active   cetirizine (ZYRTEC) 10 MG Tab 6/9/2025 Active                  Audit from Redirected Encounters    **Home medications have not yet been reviewed for this encounter**       Current Medications[1]    Allergies  Allergies[2]    Vital Signs last 24 hours  Temp:  [35.8 °C (96.5 °F)] 35.8 °C (96.5 °F)  Pulse:  [] 89  Resp:  [18-38] 38  BP: (182-240)/() 182/98  SpO2:  [92 %-96 %] 93 %    Physical Exam  Physical Exam  Vitals and nursing note reviewed.   Constitutional:       General: She is awake. She is in acute distress.      Appearance: She is well-developed. She is obese. She is not toxic-appearing.      Interventions: Nasal cannula in place.   HENT:      Head: Normocephalic and atraumatic.      Nose: Nose normal. No congestion.      Mouth/Throat:      Mouth:  Mucous membranes are moist.      Pharynx: Oropharynx is clear.   Eyes:      General: No scleral icterus.     Conjunctiva/sclera: Conjunctivae normal.      Pupils: Pupils are equal, round, and reactive to light.      Comments: Limited eye movement side-to-side bilaterally, full eye motion up and down.  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      Trachea: No tracheal deviation.   Cardiovascular:      Rate and Rhythm: Normal rate and regular rhythm.      Pulses: Normal pulses.      Heart sounds: Normal heart sounds. No murmur heard.     Comments: Hypertensive requiring nicardipine infusion  Pulmonary:      Effort: Pulmonary effort is normal. No respiratory distress.      Breath sounds: Normal breath sounds. No wheezing, rhonchi or rales.      Comments: Protecting airway and speaking in full sentences  Abdominal:      General: Bowel sounds are normal. There is no distension.      Palpations: Abdomen is soft.      Tenderness: There is no abdominal tenderness. There is no guarding or rebound.   Musculoskeletal:         General: No tenderness.      Cervical back: Neck supple. No tenderness.      Right lower leg: No edema.      Left lower leg: No edema.   Skin:     General: Skin is warm and dry.      Capillary Refill: Capillary refill takes less than 2 seconds.      Findings: No rash.   Neurological:      General: No focal deficit present.      Mental Status: She is alert and oriented to person, place, and time.      Cranial Nerves: No cranial nerve deficit.      Sensory: No sensory deficit.      Motor: No weakness.      Comments: Minimal slurred speech   Psychiatric:         Mood and Affect: Mood normal.         Behavior: Behavior normal. Behavior is cooperative.         Thought Content: Thought content normal.         Fluids  No intake or output data in the 24 hours ending 06/10/25 1105    Laboratory  Recent Results (from the past 48 hours)   EKG     Collection Time: 06/10/25  7:47 AM   Result Value Ref Range    Report       [facility] Emergency Dept.    Test Date:  2025-06-10  Pt Name:    DAMIEN VALENCIA                  Department: ER  MRN:        [id number]                      Room:  Gender:     Female                       Technician: [id number]  :        [id number]                   Requested By:ER TRIAGE PROTOCOL  Order #:    [id number]                    Reading MD:    Measurements  Intervals                                Axis  Rate:       105                          P:          63  WI:         181                          QRS:        114  QRSD:       90                           T:          -2  QT:         331  QTc:        438    Interpretive Statements  Sinus tachycardia  Biatrial enlargement  Right axis deviation  Abnormal R-wave progression, late transition  Borderline T abnormalities, anterior leads  Compared to ECG 2023 16:03:05  Right-axis deviation now present  T-wave abnormality now present  Sinus rhythm no longer present     CBC WITH DIFFERENTIAL    Collection Time: 06/10/25  8:38 AM   Result Value Ref Range    WBC 7.2 4.8 - 10.8 K/uL    RBC 8.08 (H) 4.20 - 5.40 M/uL    Hemoglobin 21.3 (H) 12.0 - 16.0 g/dL    Hematocrit 67.2 (HH) 37.0 - 47.0 %    MCV 83.2 81.4 - 97.8 fL    MCH 26.4 (L) 27.0 - 33.0 pg    MCHC 31.7 (L) 32.2 - 35.5 g/dL    RDW 57.6 (H) 35.9 - 50.0 fL    Platelet Count 211 164 - 446 K/uL    MPV 9.9 9.0 - 12.9 fL    Neutrophils-Polys 67.60 44.00 - 72.00 %    Lymphocytes 22.30 22.00 - 41.00 %    Monocytes 7.10 0.00 - 13.40 %    Eosinophils 1.90 0.00 - 6.90 %    Basophils 0.80 0.00 - 1.80 %    Immature Granulocytes 0.30 0.00 - 0.90 %    Nucleated RBC 0.00 0.00 - 0.20 /100 WBC    Neutrophils (Absolute) 4.87 1.82 - 7.42 K/uL    Lymphs (Absolute) 1.61 1.00 - 4.80 K/uL    Monos (Absolute) 0.51 0.00 - 0.85 K/uL    Eos (Absolute) 0.14 0.00 - 0.51 K/uL    Baso (Absolute) 0.06 0.00 - 0.12 K/uL    Immature Granulocytes (abs)  0.02 0.00 - 0.11 K/uL    NRBC (Absolute) 0.00 K/uL   COMP METABOLIC PANEL    Collection Time: 06/10/25  8:38 AM   Result Value Ref Range    Sodium 138 135 - 145 mmol/L    Potassium 3.9 3.6 - 5.5 mmol/L    Chloride 98 96 - 112 mmol/L    Co2 31 20 - 33 mmol/L    Anion Gap 9.0 7.0 - 16.0    Glucose 136 (H) 65 - 99 mg/dL    Bun 15 8 - 22 mg/dL    Creatinine 0.73 0.50 - 1.40 mg/dL    Calcium 9.3 8.5 - 10.5 mg/dL    Correct Calcium 9.8 8.5 - 10.5 mg/dL    AST(SGOT) 31 12 - 45 U/L    ALT(SGPT) 26 2 - 50 U/L    Alkaline Phosphatase 102 (H) 30 - 99 U/L    Total Bilirubin 0.8 0.1 - 1.5 mg/dL    Albumin 3.4 3.2 - 4.9 g/dL    Total Protein 7.3 6.0 - 8.2 g/dL    Globulin 3.9 (H) 1.9 - 3.5 g/dL    A-G Ratio 0.9 g/dL   COD (ADULT)    Collection Time: 06/10/25  8:38 AM   Result Value Ref Range    ABO Grouping Only O     Rh Grouping Only POS     Antibody Screen-Cod NEG    TROPONIN    Collection Time: 06/10/25  8:38 AM   Result Value Ref Range    Troponin T 16 6 - 19 ng/L   ESTIMATED GFR    Collection Time: 06/10/25  8:38 AM   Result Value Ref Range    GFR (CKD-EPI) 100 >60 mL/min/1.73 m 2   ABO Rh Confirm    Collection Time: 06/10/25  8:42 AM   Result Value Ref Range    ABO Rh Confirm O POS    Prothrombin Time    Collection Time: 06/10/25  9:35 AM   Result Value Ref Range    PT 13.5 12.0 - 14.6 sec    INR 1.02 0.87 - 1.13   APTT    Collection Time: 06/10/25  9:35 AM   Result Value Ref Range    APTT 30.4 24.7 - 36.0 sec       Imaging  DX-CHEST-PORTABLE (1 VIEW)   Final Result      Cardiomegaly.      CT-CTA NECK WITH & W/O-POST PROCESSING   Final Result      1.  Small 5 mm fusiform or dissecting aneurysm of the V4 segment of the right vertebral artery.      CT-CTA HEAD WITH & W/O-POST PROCESS   Final Result      1.  5 mm fusiform aneurysm arising from the mid V4 segment of the right vertebral artery.   2.  No other evidence of intracranial aneurysm formation or occlusive cerebrovascular disease.      3.  These findings were Voalted to  "ANGÉLICA M BRIDGET at 9: 11:00 AM 6/10/2025      CT-CEREBRAL PERFUSION ANALYSIS   Final Result      1. Cerebral blood flow less than 30% possibly representing completed infarct = 2 mL. Based on distribution of this finding, this is likely to represent artifact.      2. T Max more than 6 seconds possibly representing combination of completed infarct and ischemia = 18 mL. Based on the distribution of this finding, this is likely to represent artifact.      3. Mismatched volume possibly representing ischemic brain/penumbra= 16 mL      4.  Please note that this cerebral perfusion study and report is Quantitative and targets supratentorial (cerebral) perfusion for evaluation of large vessel territory acute ischemia/infarction. For example, lacunar infarcts, and brainstem/posterior fossa    ischemia/infarction are not evaluated on this study.  Data acquisition is subject to artifacts which can yield non-anatomically plausible perfusion maps which may be due to motion, bolus timing, signal to noise ratio, or other technical factors.    Perfusion map abnormalities which show non-anatomic distributions are likely artifact.   This study is not \"stand-alone\" and should only be utilized for diagnosis, management/treatment in correlation with CT, CTA, and/or MRI and clinical factors.         CT-HEAD W/O   Final Result      1.  Small calcification or granuloma adjacent to the outlet of the right side of the fourth ventricle.      2.  Small curvilinear focus of increased attenuation in the left perimedullary cistern which may represent a small calcification, enhancing venous structure, or less likely minimal subarachnoid hemorrhage.               IR-EMBOLIZE-NEURO-INTRACRANIAL    (Results Pending)   EC-ECHOCARDIOGRAM COMPLETE W/O CONT    (Results Pending)    *Personally reviewed chest x-ray showing enlarged cardiac silhouette but otherwise no acute cardiopulmonary process    Assessment/Plan  * Hypertensive emergency- (present on " admission)  Assessment & Plan  With associated headache and neurologic changes  Titrate nicardipine drip to goal SBP less than 180 for the next 12 to 24 hours followed by further reduction tolerates  IV as needed labetalol, hydralazine  Resume outpatient lisinopril, amlodipine, Coreg    Aneurysm of right vertebral artery (HCC)  Assessment & Plan  Symptomatic right vertebral artery aneurysm with high risk for rupture  Neurology and neuro IR consulted with plans for endovascular embolization  Strict blood pressure management with goal SBP less than 160  Hold anticoagulation, SCDs only  Close neurologic monitoring  As needed analgesics    Chronic diastolic congestive heart failure (HCC)  Assessment & Plan  Without acute decompensation  Resume GDMT when appropriate  Strict ins/outs  Echocardiography    Type 2 diabetes mellitus without complication, without long-term current use of insulin (HCC)  Assessment & Plan  On metformin outpatient  Hold metformin for now, insulin sliding scale  Diabetic diet with goal glucose between 120 and 180  Check glycohemoglobin    Class 3 severe obesity due to excess calories without serious comorbidity with body mass index (BMI) of 40.0 to 44.9 in adult- (present on admission)  Assessment & Plan  Encouraged behavioral and dietary modification for ongoing weight loss    Polycythemia vera (HCC)- (present on admission)  Assessment & Plan  Acute on chronic due to obesity/hypoventilation  Monitor with daily CBC  Consider therapeutic phlebotomy        Discussed patient condition and risk of morbidity and/or mortality with Family, RN, Charge nurse / hot rounds, Patient, neurology, and emergency physician and neuro IR.    The patient is critically ill.  Critical care time = 109 minutes in directly providing and coordinating critical care and extensive data review.  No time overlap and excludes procedures.    Please note that this dictation was created using voice recognition software. I have made  every reasonable attempt to correct obvious errors, but there may be errors of grammar and possibly content that I did not discover before finalizing the note.         [1]   Current Facility-Administered Medications   Medication Dose Route Frequency Provider Last Rate Last Admin    niCARdipine (Cardene) 25 mg in  mL Standard Infusion  0-15 mg/hr Intravenous Continuous Indy Lester M.D. 50 mL/hr at 06/10/25 1009 5 mg/hr at 06/10/25 1009    VERAPAMIL HCL 2.5 MG/ML IV SOLN             HEPARIN SODIUM (PORCINE) 1000 UNIT/ML INJ SOLN             NITROGLYCERIN 2 MG IV SOLN             FENTANYL CITRATE (PF) 0.05 MG/ML INJ SOLN (WRAPPED)             SUGAMMADEX SODIUM 200 MG/2ML IV SOLN             LIDOCAINE HCL 4 % MT [facility]             Respiratory Therapy Consult   Nebulization Continuous RT Jeremy M Gonda, M.D.        NS infusion   Intravenous Continuous Jeremy M Gonda, M.D.        ondansetron (Zofran ODT) dispertab 4 mg  4 mg Oral Q4HRS PRN Jeremy M Gonda, M.D.        Or    ondansetron (Zofran) syringe/vial injection 4 mg  4 mg Intravenous Q4HRS PRN Jeremy M Gonda, M.D.        acetaminophen (Tylenol) tablet 650 mg  650 mg Oral Q4HRS PRN Jeremy M Gonda, M.D.        Or    acetaminophen (Tylenol) suppository 650 mg  650 mg Rectal Q4HRS PRN Jeremy M Gonda, M.D.        MD Alert...ICU Electrolyte Replacement per Pharmacy   Other PHARMACY TO DOSE Jeremy M Gonda, M.D.        midazolam (Versed) injection 1-5 mg  1-5 mg Intravenous Q HOUR PRN Jeremy M Gonda, M.D.        niCARdipine (Cardene) 25 mg in  mL Standard Infusion  0-15 mg/hr Intravenous Continuous Jeremy M Gonda, M.D.        labetalol (Normodyne/Trandate) injection 10-20 mg  10-20 mg Intravenous Q4HRS PRN Jeremy M Gonda, M.D.        hydrALAZINE (Apresoline) injection 10 mg  10 mg Intravenous Q4HRS PRN Jeremy M Gonda, M.D.        enalaprilat injection 1.25 mg 1 mL  1.25 mg Intravenous Q6HRS PRN Jeremy M Gonda, M.D.        senna-docusate (Pericolace Or Senokot  S) 8.6-50 MG per tablet 2 Tablet  2 Tablet Oral Q EVENING Jeremy M Gonda, M.D.        And    polyethylene glycol/lytes (Miralax) Packet 1 Packet  1 Packet Oral QDAY PRN Jeremy M Gonda, M.D.         Current Outpatient Medications   Medication Sig Dispense Refill    acetaminophen (TYLENOL) 500 MG Tab Take 1,000 mg by mouth one time as needed for Mild Pain (headache). 1,000 mg = 2 tabs      cetirizine (ZYRTEC) 10 MG Tab Take 10 mg by mouth 1 time a day as needed for Allergies.     [2] No Known Allergies

## 2025-06-10 NOTE — ED NOTES
from Lab called with critical result of Hct 67.2 at 0855. Critical lab result read back to .   Dr. Lester notified of critical lab result at 0855.  Critical lab result read back by Dr. Lester.

## 2025-06-10 NOTE — OR SURGEON
Immediate Post- Operative Note        Findings: Right vertebral aneurysm      Procedure(s): Flow diverter placement     Patient is on Integrilin drip 2 mcg/kg/min    Please convert to Brilinta(180 mg) and asprin  (325 mg) as soon as possible and then maintennce for 6 months.    Blood pressure goal -less than 160      Estimated Blood Loss: Less than 5 ml      Complications: None            6/10/2025     1:58 PM     Juliocesar Loznao M.D.

## 2025-06-10 NOTE — ED TRIAGE NOTES
Chief Complaint   Patient presents with    Blood Pressure Problem     Pt reports she called EMS this AM and they said she had high blood pressure. Pt reports history of same, denies taking medications.    Slurred Speech     Pt reports she woke up at 0630 and was stuttering with some slurred speech. LKW 0100. Pt reports symptoms have resolved. -stroke scale    Wheeled to triage for above complaint with family. Pt reports she woke up with a sinus headache this morning and called EMS and they told her she has high blood pressure. Pt states she was stuttering and family noticed some slurred speech around 0630. LKW 0100. Symptoms resolved since. -stroke scale. -thinners    History of htn, no meds currently    Pt placed in lobby and educated on triage process. Pt encouraged to alert staff for any changes.    EKG ordered.     Patient and staff wearing appropriate PPE.

## 2025-06-10 NOTE — PROGRESS NOTES
Pt presents to IR1. Roseann was consented by MD at bedside, confirmed by this RN and consent at bedside. Anesthesia consent confirmed and at bedside. Anesthesia care provided by .  Pt transferred to IR1 table in Supine position.   Patient underwent a Cerebral Angiogram with  by Dr. Stevenson. Procedure site was marked by MD and verified using imaging guidance. Pt placed on monitor, prepped and draped in a sterile fashion. All vital signs monitoring and medication administration by anesthesia services - See anesthesia flowsheet for details. Report called to DEJA Butler by marjorie with RN and Dr Harris to DEJA 10B.       Microvention Flow Re-direction Endoluminal Device @ 1340  Right Vertebral Artery  [id number]    REF: [id number]  LOT: [id number]  EXP: 12/31/2025     Angioseal Right Femoral @ 1345    REF: [id number]  LOT: [id number]  EXP: 2/14/2026     Oozing noted to groin site with DEJA RN. Manual pressure held, Dr Stevenson notified by DEJA RN.  Femstop deployed due to persistent groin site bleeding with DEJA RN.

## 2025-06-10 NOTE — PROGRESS NOTES
This RN took over RN care from LOGAN Ball and LOGAN Pantoja intra-procedure @ 2946. Patient intubated and sedated at time of this RN arrival. Anes care provided by Dr. Harris. Patient underwent a Cerebral angiogram w/ embolization by Dr. Lozano. Procedure site was marked by MD and verified using imaging guidance. Pt placed on monitor, prepped and draped in a sterile fashion. All vital signs monitoring and medication administration by anesthesia services - See anesthesia flowsheet for details. Report provided to LOGAN Ball and LOGAN Pantoja @ 1123.

## 2025-06-10 NOTE — ED NOTES
Bilateral PIVs started. Blood drawn and sent to lab. Neurology at bedside.    Pt to CT with trauma RN.

## 2025-06-10 NOTE — ANESTHESIA PROCEDURE NOTES
Peripheral IV    Date/Time: 6/10/2025 12:43 PM    Performed by: Iker Harris III, M.D.  Authorized by: Iker Harris III, M.D.    Size:  16 G (short B Torrez)  Laterality:  Left  Peripheral IV Location:  Hand  Local Anesthetic:  None  Site Prep:  Chlorhexidine  Technique:  Direct puncture  Attempts:  1

## 2025-06-10 NOTE — ED PROVIDER NOTES
ED Provider Note    Scribed for Indy Lester M.D. by Jed Orr. 6/10/2025, 8:25 AM.    Primary care provider: Lorna Desir P.A.-C.  Means of arrival: Family member  History obtained from: Patient  History limited by: None    CHIEF COMPLAINT  Chief Complaint   Patient presents with    Blood Pressure Problem     Pt reports she called EMS this AM and they said she had high blood pressure. Pt reports history of same, denies taking medications.    Slurred Speech     Pt reports she woke up at 0630 and was stuttering with some slurred speech. LKW 0100. Pt reports symptoms have resolved. -stroke scale       HPI/ROS  Adri Maldonado is a 49 y.o. female who presents to the Emergency Department complaining of hypertension onset prior to arrival.  Per the  they went to bed at midnight last night and patient was acting like her normal self.  Patients  notes when she called him around 6 AM she was stuttering and experiencing slurred speech.  The symptoms have since resolved but patient noted that her blood pressure was very high and therefore she came in for further evaluation.  She did have a slight headache earlier with the slurred speech at 6 AM, but this is also resolved.Patient states she experienced similar symptoms before when she had COVID.  Patient has a history of polycythemia vera as well as hypertension but notes that she is not on any medications, has been off of medications for over a year.    EXTERNAL RECORDS REVIEWED  Patient last saw cardiologist on 12/13/2021 for hypertension.  Per that note she also has a history of polycythemia vera and COPD.  She last had a echocardiogram    LIMITATION TO HISTORY   Select: : None    OUTSIDE HISTORIAN(S):  Family at bedside to provide additional history      PAST MEDICAL HISTORY   has a past medical history of ASTHMA and Hypertension.    SURGICAL HISTORY  patient denies any surgical history    SOCIAL HISTORY  Social History[1]   Social History  "    Substance and Sexual Activity   Drug Use No       FAMILY HISTORY  History reviewed. No pertinent family history.    CURRENT MEDICATIONS  Home Medications       Reviewed by Cass Arvizu R.N. (Registered Nurse) on 06/10/25 at 0754  Med List Status: Partial     Medication Last Dose Status   albuterol (PROVENTIL) 2.5mg/3ml Nebu Soln solution for nebulization  Active   albuterol (PROVENTIL) 2.5mg/3ml Nebu Soln solution for nebulization  Active   albuterol 108 (90 Base) MCG/ACT Aero Soln inhalation aerosol  Active   budesonide-formoterol (SYMBICORT) 160-4.5 MCG/ACT Aerosol  Active   carvedilol (COREG) 25 MG Tab  Active   cetirizine (ZYRTEC) 10 MG Tab  Active   felodipine (PLENDIL) 10 MG TABLET SR 24 HR  Active   furosemide (LASIX) 40 MG Tab  Active   lisinopril (PRINIVIL) 20 MG Tab  Active                  Audit from Redirected Encounters    **Home medications have not yet been reviewed for this encounter**         ALLERGIES  Allergies[2]    PHYSICAL EXAM  VITAL SIGNS: BP (!) 239/164   Pulse (!) 109   Temp 35.8 °C (96.5 °F) (Temporal)   Resp 18   Ht 1.651 m (5' 5\")   Wt 115 kg (253 lb)   SpO2 96%   BMI 42.10 kg/m²   Vitals reviewed by myself.  Physical Exam  Nursing note and vitals reviewed.  Constitutional: Well-developed and well-nourished. No distress.   HENT: Head is normocephalic and atraumatic. Oropharynx is clear and moist without exudate or erythema.   Eyes: Pupils are equal, round, and reactive to light. No horizontal or vertical nystagmus. Conjunctiva are normal.   Cardiovascular: Normal rate and regular rhythm. No murmur heard. Normal radial pulses.  Pulmonary/Chest: Breath sounds normal. No wheezes or rales.   Abdominal: Soft and non-tender. No distention.    Musculoskeletal: Extremities exhibit normal range of motion without edema or tenderness. Patient ambulates with a normal narrow-based steady gait.   Neurological: Awake, alert and oriented to person, place, and time. No focal deficits noted. " Cranial nerves II - XII intact. No pronator drift.  No dysmetria on cerebellar testing.  Normal strength and sensation in bilateral upper and lower extremities. NIH of 1 on my assessment for slightly slurred speech/word finding difficulty  Skin: Skin is warm and dry. No rash.   Psychiatric: Normal mood and affect. Appropriate for clinical situation.     DIAGNOSTIC STUDIES:  LABS  Labs Reviewed   CBC WITH DIFFERENTIAL - Abnormal; Notable for the following components:       Result Value    RBC 8.08 (*)     Hemoglobin 21.3 (*)     Hematocrit 67.2 (*)     MCH 26.4 (*)     MCHC 31.7 (*)     RDW 57.6 (*)     All other components within normal limits   COMP METABOLIC PANEL - Abnormal; Notable for the following components:    Glucose 136 (*)     Alkaline Phosphatase 102 (*)     Globulin 3.9 (*)     All other components within normal limits   COD (ADULT)   TROPONIN   ABO RH CONFIRM   ESTIMATED GFR   PROTHROMBIN TIME   APTT       All labs reviewed and independently interpreted by myself    EKG Interpretation:    12 Lead EKG independently interpreted by myself to show:  EKG at 7:47 AM: Sinus tachycardia at a rate of 105, right axis deviation, intervals within normal limits, , QRS 90, QTc 438, no acute ST-T segment changes, no evidence of acute arrhythmia or ischemia per my independent interpretation    RADIOLOGY  Images independently interpreted by myself prior to radiologist review:  - CT of the head demonstrates no acute intracranial hemorrhage    Final interpretation by radiology demonstrates:    DX-CHEST-PORTABLE (1 VIEW)   Final Result      Cardiomegaly.      CT-CTA NECK WITH & W/O-POST PROCESSING   Final Result      1.  Small 5 mm fusiform or dissecting aneurysm of the V4 segment of the right vertebral artery.      CT-CTA HEAD WITH & W/O-POST PROCESS   Final Result      1.  5 mm fusiform aneurysm arising from the mid V4 segment of the right vertebral artery.   2.  No other evidence of intracranial aneurysm  "formation or occlusive cerebrovascular disease.      3.  These findings were Voalted to ANGÉLICA GILL at 9: 11:00 AM 6/10/2025      CT-CEREBRAL PERFUSION ANALYSIS   Final Result      1. Cerebral blood flow less than 30% possibly representing completed infarct = 2 mL. Based on distribution of this finding, this is likely to represent artifact.      2. T Max more than 6 seconds possibly representing combination of completed infarct and ischemia = 18 mL. Based on the distribution of this finding, this is likely to represent artifact.      3. Mismatched volume possibly representing ischemic brain/penumbra= 16 mL      4.  Please note that this cerebral perfusion study and report is Quantitative and targets supratentorial (cerebral) perfusion for evaluation of large vessel territory acute ischemia/infarction. For example, lacunar infarcts, and brainstem/posterior fossa    ischemia/infarction are not evaluated on this study.  Data acquisition is subject to artifacts which can yield non-anatomically plausible perfusion maps which may be due to motion, bolus timing, signal to noise ratio, or other technical factors.    Perfusion map abnormalities which show non-anatomic distributions are likely artifact.   This study is not \"stand-alone\" and should only be utilized for diagnosis, management/treatment in correlation with CT, CTA, and/or MRI and clinical factors.         CT-HEAD W/O   Final Result      1.  Small calcification or granuloma adjacent to the outlet of the right side of the fourth ventricle.      2.  Small curvilinear focus of increased attenuation in the left perimedullary cistern which may represent a small calcification, enhancing venous structure, or less likely minimal subarachnoid hemorrhage.               IR-EMBOLIZE-NEURO-INTRACRANIAL    (Results Pending)     The radiologist's interpretation of all radiological studies have been reviewed by me.          COURSE & MEDICAL DECISION MAKING      CRITICAL " CARE  The very real possibility of a deterioration of this patient's condition required the highest level of my preparedness for sudden, emergent intervention.  I provided critical care services, which included medication orders, frequent reevaluations of the patient's condition and response to treatment, ordering and reviewing test results, and discussing the case with various consultants.  The critical care time associated with the care of the patient was 40 minutes. Review chart for interventions. This time is exclusive of any other billable procedures.     INITIAL ASSESSMENT, ED COURSE AND PLAN    Patient is a 49-year-old female who presents for evaluation of slurred speech and hypertension.  Differential diagnosis includes hypertensive urgency, intracranial hemorrhage, aneurysm, stroke, TIA, electrolyte derangement, arrhythmia.  Diagnostic workup includes labs and CT imaging of the head and neck.    On arrival patient's initial vitals notable for systolic blood pressure in the 240s.  Per family slurred speech seems to have resolved.  On my exam she has an NIH of 1 with some word finding difficulty and mild slurred speech.  Given timing of symptoms she would not be a TNK candidate.  Stroke protocol was activated for further evaluation.  EKG returns demonstrates no evidence of acute arrhythmia or ischemia.  CTA imaging returns and demonstrates no large vessel occlusion or perfusion mismatch.  Case discussed with neurology and they recommend lowering blood pressure with goal of approximately 180 systolic.  She is treated with labetalol x 2 doses with no response.    I was notified by radiology that patient was noted to have a small 5 mm aneurysm of the right vertebral artery, there is concern that is dissecting.  I again spoke with neurology team who spoke with neuroradiology and given her uncontrolled hypertension they elected to intervene on this aneurysm.  Patient is started on nicardipine drip for management  of hypertension and will be hospitalized to the ICU for ongoing care after IR intervention.  Case discussed with Dr. Gonda, patient hospitalized in critical condition.    Labs were reviewed and notable for elevated hemoglobin of 21 consistent with her known polycythemia vera.  Troponin and renal function are within normal limits.       REASSESSMENTS   8:25 AM - Patient was evaluated at bedside and discussed plan of care.     9:05 AM - Patient was reevaluated at bedside and discussed lab work and diagnostic imaging.     9:10 AM - I was notified by radiology that there is a vertebral artery vs aneurysm and case was discussed with neurology.     9:40 AM - Patient's BP is 215/134 after initial dose of Labetalol.     10:02 AM - Spoke with neurology and discussed plan of care. I will be starting a nicardipine drip at this time for her blood pressure.     10:00 AM - Reeval patient at bedside and discussed plan of care. Patient will be going to IR for surgical interventional for vertebral artery aneurysm. Patient will be hospitalized to ICU after procedure     10:29 AM - Spoke to Dr. Gonda (ICU).     DISPOSITION AND DISCUSSIONS  I have discussed management of the patient with the following physicians and KIM's:  Dr. Gonda (ICU), Dr. Cassidy    Discussion of management with other QHP or appropriate source(s): none     Escalation of care considered, and ultimately not performed:see above    Barriers to care at this time, including but not limited to: None.     Decision tools and prescription drugs considered including, but not limited to: see above.    DISPOSITION:  Patient will be hospitalized by Dr. Gonda in critical condition.     FINAL IMPRESSION  1. Uncontrolled hypertension    2. Slurred speech    3. Vertebral artery aneurysm (HCC)          Jed COWAN (Neil), am scribing for, and in the presence of, Indy Lester M.D..    Electronically signed by: Jed Orr (Neil), 6/10/2025    Indy COWAN M.D.  personally performed the services described in this documentation, as scribed by Jed Orr in my presence, and it is both accurate and complete.    The note accurately reflects work and decisions made by me.  Indy Lester M.D.  6/10/2025  12:33 PM                   [1]   Social History  Tobacco Use    Smoking status: Former     Types: Cigarettes    Smokeless tobacco: Never   Vaping Use    Vaping status: Never Used   Substance Use Topics    Alcohol use: Not Currently     Comment: occ    Drug use: No   [2] No Known Allergies

## 2025-06-10 NOTE — ASSESSMENT & PLAN NOTE
Acute on chronic due to obesity/hypoventilation  On home 02    Monitor with daily CBC  Given resolution of symptoms will forego phlebotomy  Encouraged her to follow up for sleep study and potential CPAP use

## 2025-06-10 NOTE — ASSESSMENT & PLAN NOTE
With associated headache and neurologic changes    Off nicardipine   Has not been taking any of her home medications for quite some time    Restart home meds:  Lisinopril 5  Coreg started at 6.25 for the time being, can titrate as tolerated  Amlodipine 5    IV as needed labetalol, hydralazine  Have re-iterated to patient need for outpatient adherence and close follow up to check up on BP control and dosing titrations

## 2025-06-10 NOTE — ASSESSMENT & PLAN NOTE
Symptomatic right vertebral artery aneurysm with high risk for rupture, noted somewhat incidentally at this admission iso of confounding factors including hypertensive crisis    6/10- AMNA placed flow diverter across right vertebral aneurysm    Neurologic exam stable without focality    Neurology and neuro IR following   Strict blood pressure management with goal SBP less than 160  DAPT x 6 months  Hold VTE ppx pending dispo (ambulatory and low risk at this point)  Close neurologic monitoring  As needed analgesics

## 2025-06-10 NOTE — DISCHARGE PLANNING
[facility]itional [facility] Coordination    Referral from: Dr. Gonda    Insurance Provider on Facesheet: [employer]    Potential Rehab Diagnosis: CVA    Chart review indicates patient may have on going medical management and may have therapy needs to possibly meet inpatient rehab facility criteria with the goal of returning to community.    D/C support will need to be verified: Spouse    Physiatry consultation pended per protocol.  W/U & TX pending.     Thank you for the referral.

## 2025-06-10 NOTE — CONSULTS
Neurology STROKE CODE H&P  Vascular Neurology Service, [facility]    Referring Physician: Indy Lester M.D.    STROKE CODE:   Chief Complaint   Patient presents with    Blood Pressure Problem     Pt reports she called EMS this AM and they said she had high blood pressure. Pt reports history of same, denies taking medications.    Slurred Speech     Pt reports she woke up at 0630 and was stuttering with some slurred speech. LKW 0100. Pt reports symptoms have resolved. -stroke scale       To obtain the most accurate data regarding the time called, and time patient seen, refer to the stroke run-sheet and chart.  For time of CT, refer to the radiology report. See A&P below for TPA Decision and door to needle time if and when applicable.    HPI: Adri Maldonado is a 49 y.o. F with history of hypertension (not taking medications), CHF, and migraines who presented with dysarthria and headache. On presentation the patient was in hypertensive urgency with systolics in excess of 240. On exam there are no lateralizing deficits, and speech is clear. Per the patient's  they went to bed at midnight and when he called her this morning at ~0630 the slurred speech was noticed. NCCTH negative for acute findings, there are calcifications adjacent to the right ventricle, as well as calcification in the left perimedullary cistern. CTA head and neck revealed a 5mm fusiform aneurysm in the V4 segment of the right vertebral artery. Case was discussed and imaging reviewed in detail with Neuro IR. There is no evidence of subarachnoid hemorrhage. Neurology has been consulted for further evaluation of the above.     Review of systems: In addition to what is detailed in the HPI above, all other systems reviewed and are negative.    Past Medical History:    has a past medical history of ASTHMA and Hypertension.    FHx:  family history is not on file.    SHx:   reports that she has quit smoking. Her smoking use  included cigarettes. She has never used smokeless tobacco. She reports that she does not currently use alcohol. She reports that she does not use drugs.    Allergies:  Allergies[1]    Medications:  Current Medications[2]    Physical Examination:    Vitals:    06/10/25 0824 06/10/25 0826 06/10/25 0903 06/10/25 0913   BP:  (!) 240/165 (!) 205/124 (!) 221/138   Pulse: 99  (!) 107 100   Resp:  (!) 26  (!) 27   Temp:       TempSrc:       SpO2: 96%  95% 95%   Weight:       Height:           General: Patient is awake and in no acute distress  Eye: Examination of optic disks not indicated at this time given acuity of consult  Neck: There is normal range of motion  CV: Regular rate   Extremities:  Clear, dry, intact, without peripheral edema    NEUROLOGICAL EXAM:     Mental status: Awake, alert and fully oriented  Speech and language: Speech is clear and fluent. The patient is able to name and repeat, and follow commands  Cranial nerve exam: Pupils are equal, round and reactive to light bilaterally. Visual fields are full. There is no nystagmus. Extraocular muscles are intact. Face is symmetric. Sensation in the face is intact to light touch. Palate elevates symmetrically. Tongue is midline.  Motor exam: There is sustained antigravity with no downward drift in bilateral arms and legs.  There is no pronator drift.  Tone is normal. No abnormal movements were seen on exam.  Sensory exam:  Reacts to tactile in all 4 distal extremities, there is no neglect to double stim.  Deep tendon reflexes:  2+ throughout. Toes down-going bilaterally.  Coordination: No ataxia on bilateral finger-to-nose testing.  Gait: Deferred due to patient preference.    NIHSS: National Institutes of Health Stroke Scale    [0] 1a:Level of Consciousness    0-alert 1-drowsy   2-stupor   3-coma  [0] 1b:LOC Questions                  0-both  1-one      2-neither  [0] 1c:LOC Commands                   0-both  1-one      2-neither  [0] 2: Best Gaze                      0-nl    1-partial  2-forced  [0] 3: Visual Fields                   0-nl    1-partial  2-complete 3-bilat  [0] 4: Facial Paresis                0-nl    1-minor    2-partial  3-full  MOTOR                       0-nl  [0] 5: Right Arm           1-drift  [0] 6: Left Arm             2-some effort vs gravity  [0] 7: Right Leg           3-no effort vs gravity  [0] 8: Left Leg             4-no movement                             x-untestable  [0] 9: Limb Ataxia                    0-abs   1-1_limb   2-2+_limbs       x-untestable  [0] 10:Sensory                        0-nl    1-partial  2-dense  [0] 11:Best Language/Aphasia         0-nl    1-mild/mod 2-severe   3-mute  [0] 12:Dysarthria                     0-nl    1-mild/mod 2-severe       x-untestable  [0] 13:Neglect/Inattention            0-none  1-partial  2-complete  [0] TOTAL    NIHSS Date/Time: 06/10/2025 @ 0910    Baseline Modified Clinton Scale (MRS): 0 = No symptoms    Objective Data:    Labs:  Lab Results   Component Value Date/Time    PROTHROMBTM 14.2 10/31/2021 05:01 PM    INR 1.19 (H) 10/31/2021 05:01 PM      Lab Results   Component Value Date/Time    WBC 7.2 06/10/2025 08:38 AM    RBC 8.08 (H) 06/10/2025 08:38 AM    HEMOGLOBIN 21.3 (H) 06/10/2025 08:38 AM    HEMATOCRIT 67.2 (HH) 06/10/2025 08:38 AM    MCV 83.2 06/10/2025 08:38 AM    MCH 26.4 (L) 06/10/2025 08:38 AM    MCHC 31.7 (L) 06/10/2025 08:38 AM    MPV 9.9 06/10/2025 08:38 AM    NEUTSPOLYS 67.60 06/10/2025 08:38 AM    LYMPHOCYTES 22.30 06/10/2025 08:38 AM    MONOCYTES 7.10 06/10/2025 08:38 AM    EOSINOPHILS 1.90 06/10/2025 08:38 AM    BASOPHILS 0.80 06/10/2025 08:38 AM    HYPOCHROMIA 1+ 11/05/2021 04:01 AM    ANISOCYTOSIS 1+ 01/11/2023 04:24 PM      Lab Results   Component Value Date/Time    SODIUM 138 06/10/2025 08:38 AM    POTASSIUM 3.9 06/10/2025 08:38 AM    CHLORIDE 98 06/10/2025 08:38 AM    CO2 31 06/10/2025 08:38 AM    GLUCOSE 136 (H) 06/10/2025 08:38 AM    BUN 15 06/10/2025 08:38 AM     CREATININE 0.73 06/10/2025 08:38 AM    CREATININE 0.6 05/28/2006 03:30 PM      Lab Results   Component Value Date/Time    TRIGLYCERIDE 161 (H) 11/02/2021 02:06 AM       Lab Results   Component Value Date/Time    ALKPHOSPHAT 102 (H) 06/10/2025 08:38 AM    ASTSGOT 31 06/10/2025 08:38 AM    ALTSGPT 26 06/10/2025 08:38 AM    TBILIRUBIN 0.8 06/10/2025 08:38 AM        Imaging/Testing:    I interpreted and/or reviewed the patient's neuroimaging    DX-CHEST-PORTABLE (1 VIEW)   Final Result      Cardiomegaly.      CT-CTA NECK WITH & W/O-POST PROCESSING   Final Result      1.  Small 5 mm fusiform or dissecting aneurysm of the V4 segment of the right vertebral artery.      CT-CTA HEAD WITH & W/O-POST PROCESS   Final Result      1.  5 mm fusiform aneurysm arising from the mid V4 segment of the right vertebral artery.   2.  No other evidence of intracranial aneurysm formation or occlusive cerebrovascular disease.      3.  These findings were Voalted to ANGÉLICA GILL at 9: 11:00 AM 6/10/2025      CT-CEREBRAL PERFUSION ANALYSIS   Final Result      1. Cerebral blood flow less than 30% possibly representing completed infarct = 2 mL. Based on distribution of this finding, this is likely to represent artifact.      2. T Max more than 6 seconds possibly representing combination of completed infarct and ischemia = 18 mL. Based on the distribution of this finding, this is likely to represent artifact.      3. Mismatched volume possibly representing ischemic brain/penumbra= 16 mL      4.  Please note that this cerebral perfusion study and report is Quantitative and targets supratentorial (cerebral) perfusion for evaluation of large vessel territory acute ischemia/infarction. For example, lacunar infarcts, and brainstem/posterior fossa    ischemia/infarction are not evaluated on this study.  Data acquisition is subject to artifacts which can yield non-anatomically plausible perfusion maps which may be due to motion, bolus timing, signal  "to noise ratio, or other technical factors.    Perfusion map abnormalities which show non-anatomic distributions are likely artifact.   This study is not \"stand-alone\" and should only be utilized for diagnosis, management/treatment in correlation with CT, CTA, and/or MRI and clinical factors.         CT-HEAD W/O   Final Result      1.  Small calcification or granuloma adjacent to the outlet of the right side of the fourth ventricle.      2.  Small curvilinear focus of increased attenuation in the left perimedullary cistern which may represent a small calcification, enhancing venous structure, or less likely minimal subarachnoid hemorrhage.                   Assessment and Plan:    49 y.o. F with history of poorly controlled HTN, CHF, and migraines presenting with transient dysarthria and headache. NCCTH negative for acute findings, there are calcifications adjacent to the right ventricle, as well as calcification in the left perimedullary cistern. CTA head and neck revealed a 5mm fusiform aneurysm in the V4 segment of the right vertebral artery. Upon discussed and review with Neuro IR it is determined that there is no SAH at this time. With the location of the aneurysm and the patient's elevated blood pressures Neuro IR will take the patient to the IR Suite for a diagnostic angio and anticipate pipeline stenting of the right vertebral artery out of an abundance of caution due to aneurysm location and uncontrolled hypertension. She will need to be admitted to RICU post operatively for close neuro monitoring and blood pressure management.     Problem list:  Right vertebral artery aneurysm  Hypertensive urgency    Recommendations:  - to cath lab for diagnostic anigo and stenting of the right vertebral artery  - admit to RICU post-operatively  - Q2h neuro checks  - expedite MRI brain without contrast  - BP goal -140, antihypertensives per ICU team   - stroke labs:  HgbA1c and lipid panel  - " Antithrombotics/antiplatelets per Neuro IR guidance   - DM management for goal HgbA1c < 7, acutely aim for FSBS   - PT/OT/SLP ok, even within first 24 hours post-operatively  - DVT: SCDs    Case reviewed and plan created with Dr. Prema Cassidy, Vascular Neurology, and Dr. Indy Lester, Emergency Medicine, and Dr. Raul Lozano, Neuro Interventional Radiology. Please call with any questions.      Vince OTOOLE  Vascular Neurology, [facility]  [phone]           [1] No Known Allergies  [2] No current facility-administered medications for this encounter.    Current Outpatient Medications:     budesonide-formoterol (SYMBICORT) 160-4.5 MCG/ACT Aerosol, Inhale 2 Puffs 2 times a day., Disp: 1 Each, Rfl: 2    albuterol 108 (90 Base) MCG/ACT Aero Soln inhalation aerosol, Inhale 2 Puffs every four hours as needed for Shortness of Breath., Disp: 8 g, Rfl: 2    albuterol (PROVENTIL) 2.5mg/3ml Nebu Soln solution for nebulization, Take 3 mL by nebulization every four hours as needed for Shortness of Breath., Disp: 30 Each, Rfl: 2    carvedilol (COREG) 25 MG Tab, TAKE 1 TABLET BY MOUTH 2 TIMES A DAY WITH MEALS. HOLD FOR SYSTOLIC BP <105 OR HEART RATE LESS THAN 55., Disp: 180 Tablet, Rfl: 3    cetirizine (ZYRTEC) 10 MG Tab, , Disp: , Rfl:     felodipine (PLENDIL) 10 MG TABLET SR 24 HR, Take 10 mg by mouth every day., Disp: , Rfl:     furosemide (LASIX) 40 MG Tab, Take 1 Tablet by mouth every day., Disp: 90 Tablet, Rfl: 3    lisinopril (PRINIVIL) 20 MG Tab, Take 20 mg by mouth 2 times a day., Disp: , Rfl:     albuterol (PROVENTIL) 2.5mg/3ml Nebu Soln solution for nebulization, Take 2.5 mg by nebulization every four hours as needed for Shortness of Breath., Disp: , Rfl:

## 2025-06-10 NOTE — DISCHARGE PLANNING
"TCN following. HTH/SCP chart review completed. Note pt currently in ED 2' to blood pressure problem and slurred speech.  Per Cass Arvizu R.N. note on 6/10/25 at 7:54 AM, patient \"wheeled to triage for clementina complaint with family. Pt reports she woke up with a sinus headache this morning and called EMS and they told her she has high blood pressure. Pt states she was stuttering and family noticed some slurred speech around 0630. LKW 0100. Symptoms resolved since. -stroke scale. -thinners\".      Per chart review, patient has a Non-[facility] PCP.  Per review, ambulation status unknown at this time but per chart review, she is ambulatory at baseline and is currently on RA.  At this time discharge plan is highly dependent on medical course (either directly from ED or after admission to [facility] if warranted).     If patient does not warrant admission/ inpatient status to [facility] and is unable to functionally discharge home, please reach out to TCN for assist with SCP auth to discharge directly from ED to [facility].     If patient admits to [facility], please reach out to TCN via VOALTE if post acute transitional care needs are warranted for dc planning.    "

## 2025-06-10 NOTE — ANESTHESIA PROCEDURE NOTES
Airway    Date/Time: 6/10/2025 12:29 PM    Performed by: Iker Harris III, M.D.  Authorized by: Iker Harris III, M.D.    Location:  OR  Urgency:  Elective  Difficult Airway: No    Indications for Airway Management:  Anesthesia      Spontaneous Ventilation: absent    Sedation Level:  Deep  Preoxygenated: Yes    Patient Position:  Sniffing  Final Airway Type:  Endotracheal airway  Final Endotracheal Airway:  ETT  Cuffed: Yes    Technique Used for Successful ETT Placement:  Direct laryngoscopy    Insertion Site:  Oral  Blade Type:  Delgado  Laryngoscope Blade/Videolaryngoscope Blade Size:  2  ETT Size (mm):  7.5  Measured from:  Lips  ETT to Lips (cm):  22  Placement Verified by: auscultation and capnometry    Cormack-Lehane Classification:  Grade III - view of epiglottis only  Number of Attempts at Approach:  1

## 2025-06-10 NOTE — ANESTHESIA PREPROCEDURE EVALUATION
Date/Time: 06/10/25 1045    Scheduled providers: Juliocesar Lozano M.D.; Ana Cristina Clemente M.D.    Procedure: IR-EMBOLIZE-NEURO-INTRACRANIAL    Indications: BLOOD PRESSURE PROBLEM, SLURRED SPEECH    Location: [facility]          50yo F with CVA and anuerysm on CT, to IR for emergent tx    Relevant Problems   ANESTHESIA   (positive) KIRIT (obstructive sleep apnea)      PULMONARY   (positive) Asthma   (positive) Moderate persistent asthma with acute exacerbation   (positive) Pneumonia due to COVID-19 virus      CARDIAC   (positive) Hypertensive emergency      Other   (positive) Polycythemia vera (HCC)       Physical Exam    Airway   Mallampati: II  TM distance: >3 FB  Neck ROM: full       Cardiovascular - normal exam  Rhythm: regular  Rate: normal    (-) murmur     Dental - normal exam           Pulmonary - normal examBreath sounds clear to auscultation     Abdominal    Neurological - abnormal exam                   Anesthesia Plan    ASA 5   ASA physical status 5 criteria: intracranial bleed with mass effect    Plan - general       Airway plan will be ETT          Induction: intravenous and rapid sequence    Postoperative Plan: Postoperative administration of opioids is intended.    Pertinent diagnostic labs and testing reviewed    Informed Consent:    Anesthetic plan and risks discussed with patient.    Use of blood products discussed with: patient whom consented to blood products.

## 2025-06-10 NOTE — ED NOTES
Med Rec complete per patient and family at bedside  Allergies reviewed  Antibiotics in the past 30 days:no  Anticoagulant in past 14 days:no  [facility]

## 2025-06-10 NOTE — ASSESSMENT & PLAN NOTE
On metformin outpatient  A1c 7.2    Can reinitiate home antiglycemic regimen versus home insulin if she is ammenable  Diabetic diet with goal glucose between 120 and 180

## 2025-06-10 NOTE — ASSESSMENT & PLAN NOTE
Appears euvolemic      Resume GDMT at low doses for the time being given she has been off all medications for some time  Strict ins/outs  Echocardiography

## 2025-06-10 NOTE — PROGRESS NOTES
CCM Progress Note:    Case discussed with neuro IR    Plan to transition off of Integrilin infusion this evening at 6 PM to oral dual antiplatelet therapy    -6 PM give 180 mg of Brilinta and 325 mg of aspirin  - 8 PM stop Integrilin drip  -Start 90 mg twice daily of Brilinta with 81 mg of aspirin starting tomorrow morning

## 2025-06-10 NOTE — PROGRESS NOTES
4 Eyes Skin Assessment Completed by LOGAN Catalan and LOGAN Perez.    Skin assessment is primarily focused on high risk bony prominences. Pay special attention to skin beneath and around medical devices, high risk bony prominences, skin to skin areas and areas where the patient lacks sensation to feel pain and areas where the patient previously had breakdown.     Head (Occipital):  WDL   Ears (Under Medical Devices): WDL   Nose (Under Medical Devices): WDL   Mouth:  WDL   Neck: WDL   Breast/Chest:  WDL   Shoulder Blades:  Scar   Spine:   WDL   (R) Arm/Elbow/Hand: WDL   (L) Arm/Elbow/Hand: WDL   Abdomen: Bruising   Pannus/Groin:  Incision   Sacrum/Coccyx:   WDL   (R) Ischial Tuberosity (Sit Bones):  WDL   (L) Ischial Tuberosity (Sit Bones):  WDL   (R) Leg:  WDL scars   (L) Leg:  WDL scars   (R) Heel:  Red and Blanching   (R) Foot/Toe: WDL   (L) Heel: Red and Blanching   (L) Foot/Toe:  WDL       DEVICES IN USE:   Respiratory Devices:  Nasal cannula  Feeding Devices:  N/A   Lines & BP Monitoring Devices:  Peripheral IV, Arterial line, BP cuff, and Pulse ox    Orthopedic Devices:  N/A  Miscellaneous Devices:  Telemetry monitor and SCDs Femstop in place for groin hemostasis    PROTOCOL INTERVENTIONS:   [facility]:  Applied this assessment  Float Heels with Pillows:  Applied this assessment  Q2 Turns with Pillows:  Applied this assessment  Glide Sheet:  Applied this assessment  Silicone Nasal Cannula Tubing:  Applied this assessment  Nasal Cannula with Gray Foams:  Applied this assessment    WOUND PHOTOS:   N/A no wounds identified    WOUND CONSULT:   N/A, no advanced wound care needs identified

## 2025-06-11 DIAGNOSIS — I72.6 ANEURYSM OF RIGHT VERTEBRAL ARTERY (HCC): Primary | ICD-10-CM

## 2025-06-11 PROBLEM — D45 POLYCYTHEMIA VERA (HCC): Status: RESOLVED | Noted: 2019-06-28 | Resolved: 2025-06-11

## 2025-06-11 PROBLEM — D75.1 POLYCYTHEMIA: Status: ACTIVE | Noted: 2025-06-11

## 2025-06-11 LAB
ALBUMIN SERPL BCP-MCNC: 2.9 G/DL (ref 3.2–4.9)
ALBUMIN/GLOB SERPL: 0.9 G/DL
ALP SERPL-CCNC: 77 U/L (ref 30–99)
ALT SERPL-CCNC: 20 U/L (ref 2–50)
ANION GAP SERPL CALC-SCNC: 9 MMOL/L (ref 7–16)
AST SERPL-CCNC: 30 U/L (ref 12–45)
BILIRUB SERPL-MCNC: 0.6 MG/DL (ref 0.1–1.5)
BUN SERPL-MCNC: 16 MG/DL (ref 8–22)
CALCIUM ALBUM COR SERPL-MCNC: 9.5 MG/DL (ref 8.5–10.5)
CALCIUM SERPL-MCNC: 8.6 MG/DL (ref 8.5–10.5)
CHLORIDE SERPL-SCNC: 101 MMOL/L (ref 96–112)
CO2 SERPL-SCNC: 25 MMOL/L (ref 20–33)
CREAT SERPL-MCNC: 0.69 MG/DL (ref 0.5–1.4)
ERYTHROCYTE [DISTWIDTH] IN BLOOD BY AUTOMATED COUNT: 59.3 FL (ref 35.9–50)
GFR SERPLBLD CREATININE-BSD FMLA CKD-EPI: 105 ML/MIN/1.73 M 2
GLOBULIN SER CALC-MCNC: 3.4 G/DL (ref 1.9–3.5)
GLUCOSE BLD STRIP.AUTO-MCNC: 110 MG/DL (ref 65–99)
GLUCOSE BLD STRIP.AUTO-MCNC: 120 MG/DL (ref 65–99)
GLUCOSE BLD STRIP.AUTO-MCNC: 139 MG/DL (ref 65–99)
GLUCOSE BLD STRIP.AUTO-MCNC: 161 MG/DL (ref 65–99)
GLUCOSE SERPL-MCNC: 152 MG/DL (ref 65–99)
HCT VFR BLD AUTO: 64.6 % (ref 37–47)
HGB BLD-MCNC: 19.7 G/DL (ref 12–16)
MAGNESIUM SERPL-MCNC: 1.5 MG/DL (ref 1.5–2.5)
MCH RBC QN AUTO: 26.1 PG (ref 27–33)
MCHC RBC AUTO-ENTMCNC: 30.5 G/DL (ref 32.2–35.5)
MCV RBC AUTO: 85.6 FL (ref 81.4–97.8)
PLATELET # BLD AUTO: 203 K/UL (ref 164–446)
PMV BLD AUTO: 9.8 FL (ref 9–12.9)
POTASSIUM SERPL-SCNC: 4.8 MMOL/L (ref 3.6–5.5)
PROT SERPL-MCNC: 6.3 G/DL (ref 6–8.2)
RBC # BLD AUTO: 7.55 M/UL (ref 4.2–5.4)
SODIUM SERPL-SCNC: 128 MMOL/L (ref 135–145)
SODIUM SERPL-SCNC: 135 MMOL/L (ref 135–145)
SODIUM SERPL-SCNC: 136 MMOL/L (ref 135–145)
WBC # BLD AUTO: 7.9 K/UL (ref 4.8–10.8)

## 2025-06-11 PROCEDURE — 99233 SBSQ HOSP IP/OBS HIGH 50: CPT | Performed by: EMERGENCY MEDICINE

## 2025-06-11 PROCEDURE — 80053 COMPREHEN METABOLIC PANEL: CPT

## 2025-06-11 PROCEDURE — 700105 HCHG RX REV CODE 258: Performed by: INTERNAL MEDICINE

## 2025-06-11 PROCEDURE — 99232 SBSQ HOSP IP/OBS MODERATE 35: CPT | Performed by: NURSE PRACTITIONER

## 2025-06-11 PROCEDURE — 82962 GLUCOSE BLOOD TEST: CPT | Performed by: HOSPITALIST

## 2025-06-11 PROCEDURE — 700102 HCHG RX REV CODE 250 W/ 637 OVERRIDE(OP): Performed by: EMERGENCY MEDICINE

## 2025-06-11 PROCEDURE — 85027 COMPLETE CBC AUTOMATED: CPT

## 2025-06-11 PROCEDURE — 700102 HCHG RX REV CODE 250 W/ 637 OVERRIDE(OP): Performed by: INTERNAL MEDICINE

## 2025-06-11 PROCEDURE — 92610 EVALUATE SWALLOWING FUNCTION: CPT

## 2025-06-11 PROCEDURE — 83735 ASSAY OF MAGNESIUM: CPT

## 2025-06-11 PROCEDURE — 700111 HCHG RX REV CODE 636 W/ 250 OVERRIDE (IP): Performed by: EMERGENCY MEDICINE

## 2025-06-11 PROCEDURE — 770020 HCHG ROOM/CARE - TELE (206)

## 2025-06-11 PROCEDURE — A9270 NON-COVERED ITEM OR SERVICE: HCPCS | Performed by: EMERGENCY MEDICINE

## 2025-06-11 PROCEDURE — 97162 PT EVAL MOD COMPLEX 30 MIN: CPT

## 2025-06-11 PROCEDURE — 97165 OT EVAL LOW COMPLEX 30 MIN: CPT

## 2025-06-11 PROCEDURE — 99255 IP/OBS CONSLTJ NEW/EST HI 80: CPT | Performed by: HOSPITALIST

## 2025-06-11 PROCEDURE — 92523 SPEECH SOUND LANG COMPREHEN: CPT

## 2025-06-11 PROCEDURE — 700111 HCHG RX REV CODE 636 W/ 250 OVERRIDE (IP): Mod: JZ | Performed by: INTERNAL MEDICINE

## 2025-06-11 PROCEDURE — 84295 ASSAY OF SERUM SODIUM: CPT

## 2025-06-11 PROCEDURE — 82962 GLUCOSE BLOOD TEST: CPT | Performed by: EMERGENCY MEDICINE

## 2025-06-11 PROCEDURE — A9270 NON-COVERED ITEM OR SERVICE: HCPCS | Performed by: INTERNAL MEDICINE

## 2025-06-11 RX ORDER — MAGNESIUM SULFATE HEPTAHYDRATE 40 MG/ML
4 INJECTION, SOLUTION INTRAVENOUS ONCE
Status: COMPLETED | OUTPATIENT
Start: 2025-06-11 | End: 2025-06-11

## 2025-06-11 RX ORDER — CARVEDILOL 6.25 MG/1
6.25 TABLET ORAL 2 TIMES DAILY WITH MEALS
Status: DISCONTINUED | OUTPATIENT
Start: 2025-06-11 | End: 2025-06-13

## 2025-06-11 RX ORDER — AMLODIPINE BESYLATE 5 MG/1
5 TABLET ORAL
Status: DISCONTINUED | OUTPATIENT
Start: 2025-06-12 | End: 2025-06-11

## 2025-06-11 RX ORDER — AMLODIPINE BESYLATE 5 MG/1
5 TABLET ORAL DAILY
Status: DISCONTINUED | OUTPATIENT
Start: 2025-06-12 | End: 2025-06-13 | Stop reason: HOSPADM

## 2025-06-11 RX ORDER — CARVEDILOL 12.5 MG/1
6.25 TABLET ORAL 2 TIMES DAILY WITH MEALS
Status: DISCONTINUED | OUTPATIENT
Start: 2025-06-11 | End: 2025-06-11

## 2025-06-11 RX ORDER — FUROSEMIDE 40 MG/1
40 TABLET ORAL DAILY
Status: DISCONTINUED | OUTPATIENT
Start: 2025-06-11 | End: 2025-06-13 | Stop reason: HOSPADM

## 2025-06-11 RX ORDER — AMLODIPINE BESYLATE 10 MG/1
10 TABLET ORAL DAILY
Status: DISCONTINUED | OUTPATIENT
Start: 2025-06-12 | End: 2025-06-11

## 2025-06-11 RX ORDER — CARVEDILOL 25 MG/1
25 TABLET ORAL 2 TIMES DAILY WITH MEALS
Status: DISCONTINUED | OUTPATIENT
Start: 2025-06-11 | End: 2025-06-11

## 2025-06-11 RX ADMIN — FUROSEMIDE 40 MG: 40 TABLET ORAL at 10:03

## 2025-06-11 RX ADMIN — HYDRALAZINE HYDROCHLORIDE 10 MG: 20 INJECTION, SOLUTION INTRAMUSCULAR; INTRAVENOUS at 05:31

## 2025-06-11 RX ADMIN — SODIUM CHLORIDE: 9 INJECTION, SOLUTION INTRAVENOUS at 03:51

## 2025-06-11 RX ADMIN — AMLODIPINE BESYLATE 2.5 MG: 5 TABLET ORAL at 05:27

## 2025-06-11 RX ADMIN — LISINOPRIL 5 MG: 5 TABLET ORAL at 05:27

## 2025-06-11 RX ADMIN — MAGNESIUM SULFATE HEPTAHYDRATE 4 G: 4 INJECTION, SOLUTION INTRAVENOUS at 08:01

## 2025-06-11 RX ADMIN — TICAGRELOR 90 MG: 90 TABLET ORAL at 05:27

## 2025-06-11 RX ADMIN — LABETALOL HYDROCHLORIDE 10 MG: 5 INJECTION, SOLUTION INTRAVENOUS at 02:10

## 2025-06-11 RX ADMIN — CARVEDILOL 3.12 MG: 3.12 TABLET, FILM COATED ORAL at 07:31

## 2025-06-11 RX ADMIN — TICAGRELOR 90 MG: 90 TABLET ORAL at 17:30

## 2025-06-11 RX ADMIN — ASPIRIN 81 MG: 81 TABLET, CHEWABLE ORAL at 05:31

## 2025-06-11 RX ADMIN — INSULIN LISPRO 1 UNITS: 100 INJECTION, SOLUTION INTRAVENOUS; SUBCUTANEOUS at 07:32

## 2025-06-11 RX ADMIN — CARVEDILOL 6.25 MG: 12.5 TABLET, FILM COATED ORAL at 17:30

## 2025-06-11 SDOH — ECONOMIC STABILITY: TRANSPORTATION INSECURITY
IN THE PAST 12 MONTHS, HAS LACK OF RELIABLE TRANSPORTATION KEPT YOU FROM MEDICAL APPOINTMENTS, MEETINGS, WORK OR FROM GETTING THINGS NEEDED FOR DAILY LIVING?: NO

## 2025-06-11 SDOH — ECONOMIC STABILITY: TRANSPORTATION INSECURITY
IN THE PAST 12 MONTHS, HAS THE LACK OF TRANSPORTATION KEPT YOU FROM MEDICAL APPOINTMENTS OR FROM GETTING MEDICATIONS?: NO

## 2025-06-11 ASSESSMENT — PAIN DESCRIPTION - PAIN TYPE
TYPE: ACUTE PAIN

## 2025-06-11 ASSESSMENT — COGNITIVE AND FUNCTIONAL STATUS - GENERAL
DAILY ACTIVITIY SCORE: 23
WALKING IN HOSPITAL ROOM: A LITTLE
TOILETING: A LITTLE
SUGGESTED CMS G CODE MODIFIER DAILY ACTIVITY: CH
SUGGESTED CMS G CODE MODIFIER MOBILITY: CI
SUGGESTED CMS G CODE MODIFIER MOBILITY: CH
DAILY ACTIVITIY SCORE: 24
SUGGESTED CMS G CODE MODIFIER DAILY ACTIVITY: CI
MOBILITY SCORE: 24
MOBILITY SCORE: 23

## 2025-06-11 ASSESSMENT — SOCIAL DETERMINANTS OF HEALTH (SDOH)
WITHIN THE LAST YEAR, HAVE YOU BEEN AFRAID OF YOUR PARTNER OR EX-PARTNER?: NO
WITHIN THE PAST 12 MONTHS, THE FOOD YOU BOUGHT JUST DIDN'T LAST AND YOU DIDN'T HAVE MONEY TO GET MORE: NEVER TRUE
IN THE PAST 12 MONTHS, HAS THE ELECTRIC, GAS, OIL, OR WATER COMPANY THREATENED TO SHUT OFF SERVICE IN YOUR HOME?: NO
WITHIN THE LAST YEAR, HAVE YOU BEEN KICKED, HIT, SLAPPED, OR OTHERWISE PHYSICALLY HURT BY YOUR PARTNER OR EX-PARTNER?: NO
WITHIN THE LAST YEAR, HAVE TO BEEN RAPED OR FORCED TO HAVE ANY KIND OF SEXUAL ACTIVITY BY YOUR PARTNER OR EX-PARTNER?: NO
WITHIN THE LAST YEAR, HAVE YOU BEEN HUMILIATED OR EMOTIONALLY ABUSED IN OTHER WAYS BY YOUR PARTNER OR EX-PARTNER?: NO
WITHIN THE PAST 12 MONTHS, YOU WORRIED THAT YOUR FOOD WOULD RUN OUT BEFORE YOU GOT THE MONEY TO BUY MORE: NEVER TRUE

## 2025-06-11 ASSESSMENT — ENCOUNTER SYMPTOMS
COUGH: 0
SHORTNESS OF BREATH: 0
BLURRED VISION: 0
SENSORY CHANGE: 0
TINGLING: 0
NEUROLOGICAL NEGATIVE: 1
NAUSEA: 0
CHILLS: 0
FOCAL WEAKNESS: 0
DOUBLE VISION: 0
VOMITING: 0
FEVER: 0
HEADACHES: 0
WEAKNESS: 0
SPEECH CHANGE: 0
ROS GI COMMENTS: TOLERATING A DIET
ABDOMINAL PAIN: 0

## 2025-06-11 ASSESSMENT — LIFESTYLE VARIABLES
TOTAL SCORE: 0
ON A TYPICAL DAY WHEN YOU DRINK ALCOHOL HOW MANY DRINKS DO YOU HAVE: 0
TOTAL SCORE: 0
EVER HAD A DRINK FIRST THING IN THE MORNING TO STEADY YOUR NERVES TO GET RID OF A HANGOVER: NO
HOW MANY TIMES IN THE PAST YEAR HAVE YOU HAD 5 OR MORE DRINKS IN A DAY: 0
AVERAGE NUMBER OF DAYS PER WEEK YOU HAVE A DRINK CONTAINING ALCOHOL: 0
EVER FELT BAD OR GUILTY ABOUT YOUR DRINKING: NO
DOES PATIENT WANT TO STOP DRINKING: NO
HAVE PEOPLE ANNOYED YOU BY CRITICIZING YOUR DRINKING: NO
HAVE YOU EVER FELT YOU SHOULD CUT DOWN ON YOUR DRINKING: NO
ALCOHOL_USE: NO
CONSUMPTION TOTAL: NEGATIVE
TOTAL SCORE: 0

## 2025-06-11 ASSESSMENT — ACTIVITIES OF DAILY LIVING (ADL): TOILETING: INDEPENDENT

## 2025-06-11 ASSESSMENT — GAIT ASSESSMENTS
DISTANCE (FEET): 200
GAIT LEVEL OF ASSIST: SUPERVISED

## 2025-06-11 ASSESSMENT — FIBROSIS 4 INDEX: FIB4 SCORE: 1.65

## 2025-06-11 ASSESSMENT — PAIN SCALES - GENERAL: PAIN_LEVEL: 0

## 2025-06-11 NOTE — THERAPY
Speech Language Pathology   Cognitive Evaluation     Patient Name:  Adri Maldonado  AGE:  49 y.o., SEX:  female  Medical Record #:  [id number]  Date of Service:  6/11/2025         History of Present Illness  Patient is a 49 y.o. female who presented on 6/10  for high blood pressure and a headache slurred speech. 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 S/p Cerebral angiogram w/ embolization 6/10.     PMHx: non-insulin-dependent diabetes, congestive heart failure, hypertension, and asthma    SLP Hx:  CSE 11/2021: rec PU4/Tn0    CXR:  Cardiomegaly.    CT-Head:  1.  Small calcification or granuloma adjacent to the outlet of the right side of the fourth ventricle.   2.  Small curvilinear focus of increased attenuation in the left perimedullary cistern which may represent a small calcification, enhancing venous structure, or less likely minimal subarachnoid hemorrhage.         General Information  Vitals  O2 (LPM): 3  O2 Delivery Device: Silicone Nasal Cannula  Level of Consciousness: Alert, Awake  Orientation: Self, General place, Situation, Current year  Follows Directives: Yes      Prior Living Situation & Level of Function  Housing / Facility: [address] [place] [address]  Lives with - Patient's Self Care Capacity: Spouse, Adult Children  Communication: WFL  Swallowing: WFL       Oral Mechanism Evaluation  Dentition: Natural dentition  Facial Symmetry: Equal  Facial Sensation: Equal  Labial Observations: WFL  Lingual Observations: Midline      Laryngeal Function Exam  Secretion Management: Adequate  Voice Quality: WFL  Cough: Perceptually WNL      Subjective  Patient denied noticing acute cognitive changes. Pt cooperated with SLP evaluation.           Assessment  The patient was seen this date for a cognitive-linguistic evaluation. Portions of the COGNISTAT (The Neurobehavioral Cognitive Status Examination), as well as non-standardized assessments were utilized. Results are as  "follows:      Cognistat  Orientation: Average  Attention: Average  Repetition: Average  Naming: Average  Memory: Average  Calculations: Average  Similarities: Average  Judgement: Average      Informal Assessment  Command Followin-step: 3/3, 2-step: 3/3, 3-step: 1/3  Yes/No Questions: Simple 3/3, Moderate 3/3, Complex 3/3       Medication Management  Patient denied taking daily medications; however, daughters at bedside reported she has medications that she is supposed to take but does not. Later pt reported she has a pill box that her  helps to organize. Pt unable to read presented written questions as glasses not avaiable; therefore SLP read questions allowed. Pt demo'd significant difficulty with synthesizing information despite multiple repetitions and simplification of instructions. Pt unable to accurately answer questions despite help. Pt did not appear to be aware of errors.      Comments:  Patient communicating with fluent intelligible speech. Pt scored \"average\" in formal attention task; however, observed to have deficits in executive attention throughout evaluation. Pt often noted to be impulsive with answers; often provided an incorrect answer quickly and subsequently required re-explanation of instructions. Memory: pt demo'd difficulty with immediate recall of words following initial presentation, required multiple repetitions. However, after provided delay, pt independently recalled 2/4 words; accuracy increased to 4/4 with category prompts. Pt's spouse reported noticing impaired short term memory; stated \"she will restart a conversation that we already had,\" and \"will ask us where we went right after we leave the room.\"          Clinical Impressions  Patient presents with impaired executive attention, synthesis of information, and problem solving, per non standardized assessments. Pt's spouse endorsed presentation is a consistent change from baseline. Pt would benefit from skilled speech " "therapy in the acute setting and at next level of care.       NOTE: It is not within the scope of practice of Speech-Language Pathologists to determine patient capacity. Please defer to the physician or psych to complete this assessment.       Recommendations  Supervision Needs Upons Discharge: Intermittent assistance with IADLs (see below)  IADLs: Medication management, Financial management, Appointment management, Cooking, Household chores         SLP Treatment Plan  Treatment Plan: Cognitive Treatment  SLP Frequency: 2x Per Week  Estimated Duration: Until Therapy Goals Met      Anticipated Discharge Needs  Discharge Recommendations: Recommend outpatient speech therapy services  Therapy Recommendations Upon DC: Cognitive-Linguistic Training, Community Re-Integration, Patient / Family / Caregiver Education      Patient / Family Goals  Patient / Family Goal #1: \"is this normal?\"  Short Term Goal # 1: Patient will complete hypothetical iADL tasks with 80% accuracy and modA  Short Term Goal # 2: Patient will complete 3-5 step sequencing tasks with 80% accuracy and modA      Eric Garland, ILIANA  "

## 2025-06-11 NOTE — THERAPY
Physical Therapy   Initial Evaluation     Patient Name:  Adri Maldonado  Age:  49 y.o., Sex:  female  Medical Record #:  [id number]  Today's Date: 6/11/2025     Precautions  Medical: Blood Pressure Parameters (see comments) (100-140)    Assessment  Patient is 49 y.o. female admitted w/ slurred speech and increased BP.  Speech issues have resolved per pt.  She was indep PTA, w/o AD.  She lives in a multi story house w/ her spouse and children.  Today, she is rec'd alert, in bed agreeable to work w/ PT.  She is able to mobilize at spv level as noted below.  No loss of balance.  No need of AD.  No need of physical assist.  PT for eval only.  Plan    Physical Therapy Initial Treatment Plan   Duration: Evaluation only    DC Equipment Recommendations: None  Discharge Recommendations: Anticipate that the patient will have no further physical therapy needs after discharge from the hospital          Objective       06/11/25 0921   Prior Living Situation   Housing / Facility 3 Story House   Steps Into Home 4   Steps In Home 12   Equipment Owned None   Lives with - Patient's Self Care Capacity Spouse;Adult Children   Prior Level of Functional Mobility   Bed Mobility Independent   Transfer Status Independent   Ambulation Independent   Assistive Devices Used None   Stairs Independent   Cognition    Level of Consciousness Alert   Strength Lower Body   Comments grossly wnl   Balance Assessment   Sitting Balance (Static) Fair +   Sitting Balance (Dynamic) Fair +   Standing Balance (Static) Fair   Standing Balance (Dynamic) Fair   Weight Shift Sitting Good   Weight Shift Standing Good   Bed Mobility    Supine to Sit Supervised   Sit to Supine Supervised   Gait Analysis   Gait Level Of Assist Supervised   Assistive Device None   Distance (Feet) 200   # of Stairs Climbed 5   Level of Assist with Stairs Supervised   Functional Mobility   Sit to Stand Supervised   Bed, Chair, Wheelchair Transfer Supervised   Physical Therapy Initial  Treatment Plan    Duration Evaluation only   Anticipated Discharge Equipment and Recommendations   DC Equipment Recommendations None   Discharge Recommendations Anticipate that the patient will have no further physical therapy needs after discharge from the hospital

## 2025-06-11 NOTE — THERAPY
Speech Language Pathology   Clinical Swallow Evaluation     Patient Name:  Adri Maldonado  AGE:  49 y.o., SEX:  female  Medical Record #:  [id number]  Date of Service:  6/11/2025         History of Present Illness  Patient is a 49 y.o. female who presented on 6/10  for high blood pressure and a headache slurred speech. 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 S/p Cerebral angiogram w/ embolization 6/10.     PMHx: non-insulin-dependent diabetes, congestive heart failure, hypertension, and asthma    SLP Hx:  CSE 11/2021: rec PU4/Tn0    CXR:  Cardiomegaly.    CT-Head:  1.  Small calcification or granuloma adjacent to the outlet of the right side of the fourth ventricle.   2.  Small curvilinear focus of increased attenuation in the left perimedullary cistern which may represent a small calcification, enhancing venous structure, or less likely minimal subarachnoid hemorrhage.         General Information:  Vitals  O2 (LPM): 3  O2 Delivery Device: Silicone Nasal Cannula  Level of Consciousness: Alert, Awake  Orientation: Self, General place, Situation, Current year  Follows Directives: Yes      Prior Living Situation & Level of Function:  Housing / Facility: [address] [place] [address]  Lives with - Patient's Self Care Capacity: Spouse, Adult Children  Communication: WFL  Swallowing: WFL       Oral Mechanism Evaluation:  Dentition: Natural dentition   Facial Symmetry: Equal  Facial Sensation: Equal     Labial Observations: WFL   Lingual Observations: Midline       Laryngeal Function:  Secretion Management: Adequate  Voice Quality: WFL  Cough: Perceptually WNL        Subjective  Patient received awake, sitting upright in chair. Denied history of dysphagia.        Assessment  Current Method of Nutrition: Oral diet (Regular solids/thin liquids)  Positioning: Sitting [facility]  Bolus Administration: Patient  O2 (LPM): 3 O2 Delivery Device: Silicone Nasal Cannula  Factor(s) Affecting Performance:  None  Tracheostomy : No          Swallowing Trials:  Swallowing Trials  Thin Liquid (TN0): WFL  Pureed (PU4): WFL  Regular (RG7): WFL      Comments: Patient independently feeding without difficulty. Adequate bite and functional mastication of regular solids (norma cracker). No notable oral bolus residue observed. Pt consumed sequential sips of water via cup and complete pudding container without difficulty. No overt s/sx of aspiration. No signs of esophageal dysfunction. Education provided regarding general aspiration precautions and signs. All questions addressed.        Clinical Impressions  Patient presents with a functional swallow. No overt s/sx of aspiration. Recommend continuation of oral diet. Further acute speech therapy intervention for dysphagia is not warranted; please re-consult with any changes.       Recommendations  Diet Consistency: Regular solids/thin liquids  Instrumentation: None indicated at this time  Medication: As tolerated  Supervision: Independent  Positioning: Fully upright and midline during oral intake  Risk Management : Small bites/sips, Slow rate of intake  Oral Care: BID         SLP Treatment Plan  Treatment Plan: Cognitive Treatment  SLP Frequency: 3x Per Week  Estimated Duration: Until Therapy Goals Met      Anticipated Discharge Needs  Discharge Recommendations: Recommend outpatient speech therapy services   Therapy Recommendations Upon DC: Cognitive-Linguistic Training, Patient / Family / Caregiver Education          Eric Garland, SLP

## 2025-06-11 NOTE — ANESTHESIA TIME REPORT
Anesthesia Start and Stop Event Times       Date Time Event    6/10/2025 1157 Ready for Procedure     1222 Anesthesia Start     1413 Anesthesia Stop          Responsible Staff  06/10/25      Name Role Begin End    Iker Harris III, M.D. Anesth 1222 1413          Overtime Reason:  no overtime (within assigned shift)    Comments:

## 2025-06-11 NOTE — PROGRESS NOTES
Neurology Progress Note  Vascular Neurology Service, [facility]    Referring Physician: Steven River M.D.    Chief Complaint   Patient presents with    Blood Pressure Problem     Pt reports she called EMS this AM and they said she had high blood pressure. Pt reports history of same, denies taking medications.    Slurred Speech     Pt reports she woke up at 0630 and was stuttering with some slurred speech. LKW 0100. Pt reports symptoms have resolved. -stroke scale       HPI: Refer to initial documented Neurology H&P, as detailed in the patient's chart.    Interval History 06/11/2025: No acute events overnight. S/p pipeline stenting of the right vertebral artery with Neuro IR. On Integrilin post operatively, bridged to DAPT with ASA and Brilinta    Review of systems: In addition to what is detailed in the HPI and/or updated in the interval history, all other systems reviewed and are negative.    Past Medical History:    has a past medical history of ASTHMA and Hypertension.    FHx:  family history is not on file.    SHx:   reports that she has quit smoking. Her smoking use included cigarettes. She has never used smokeless tobacco. She reports that she does not currently use alcohol. She reports that she does not use drugs.    Medications:  Current Medications[1]    Physical Examination:     Vitals:    06/11/25 0400 06/11/25 0500 06/11/25 0527 06/11/25 0600   BP: (!) 147/87 (!) 147/81 (!) 166/87 (!) 141/77   Pulse: 73 73 77 78   Resp: 14 12 14 13   Temp: 35.9 °C (96.7 °F)   36 °C (96.8 °F)   TempSrc: Temporal   Temporal   SpO2: 96% 97% 96% 97%   Weight:       Height:           General: Patient is awake and in no acute distress  Eyes: examination of optic disks not indicated at this time  CV: RRR    NEUROLOGICAL EXAM:     Mental status: Awake, alert and fully oriented, follows commands  Speech and language: speech is not dysarthric. The patient is able to name and repeat.  Cranial nerve  exam: Pupils are equal, round and reactive to light bilaterally. Visual fields are full. Extraocular muscles are intact. Sensation in the face is intact to light touch. Face is symmetric. Shoulder shrug is full. Tongue is midline.  Motor exam: Strength is 5/5 in all extremities both distally and proximally. Tone is normal. No abnormal movements were seen on exam.  Sensory exam: No sensory deficits identified   Deep tendon reflexes: 2+ and symmetric. Toes down-going bilaterally.  Coordination: no ataxia   Gait: deferred given patient preference    Objective Data:    Labs:  Lab Results   Component Value Date/Time    PROTHROMBTM 13.5 06/10/2025 09:35 AM    INR 1.02 06/10/2025 09:35 AM      Lab Results   Component Value Date/Time    WBC 7.9 06/11/2025 03:55 AM    RBC 7.55 (H) 06/11/2025 03:55 AM    HEMOGLOBIN 19.7 (H) 06/11/2025 03:55 AM    HEMATOCRIT 64.6 (H) 06/11/2025 03:55 AM    MCV 85.6 06/11/2025 03:55 AM    MCH 26.1 (L) 06/11/2025 03:55 AM    MCHC 30.5 (L) 06/11/2025 03:55 AM    MPV 9.8 06/11/2025 03:55 AM    NEUTSPOLYS 67.60 06/10/2025 08:38 AM    LYMPHOCYTES 22.30 06/10/2025 08:38 AM    MONOCYTES 7.10 06/10/2025 08:38 AM    EOSINOPHILS 1.90 06/10/2025 08:38 AM    BASOPHILS 0.80 06/10/2025 08:38 AM    HYPOCHROMIA 1+ 11/05/2021 04:01 AM    ANISOCYTOSIS 1+ 01/11/2023 04:24 PM      Lab Results   Component Value Date/Time    SODIUM 135 06/11/2025 03:55 AM    POTASSIUM 4.8 06/11/2025 03:55 AM    CHLORIDE 101 06/11/2025 03:55 AM    CO2 25 06/11/2025 03:55 AM    GLUCOSE 152 (H) 06/11/2025 03:55 AM    BUN 16 06/11/2025 03:55 AM    CREATININE 0.69 06/11/2025 03:55 AM    CREATININE 0.6 05/28/2006 03:30 PM      Lab Results   Component Value Date/Time    CHOLSTRLTOT 251 (H) 06/10/2025 08:38 AM     (H) 06/10/2025 08:38 AM    HDL 59 06/10/2025 08:38 AM    TRIGLYCERIDE 143 06/10/2025 08:38 AM       Lab Results   Component Value Date/Time    ALKPHOSPHAT 77 06/11/2025 03:55 AM    ASTSGOT 30 06/11/2025 03:55 AM    ALTSGPT  "20 06/11/2025 03:55 AM    TBILIRUBIN 0.6 06/11/2025 03:55 AM        Imaging/Testing:    I interpreted and/or reviewed the patient's neuroimaging    DX-CHEST-PORTABLE (1 VIEW)   Final Result      Cardiomegaly.      CT-CTA NECK WITH & W/O-POST PROCESSING   Final Result      1.  Small 5 mm fusiform or dissecting aneurysm of the V4 segment of the right vertebral artery.      CT-CTA HEAD WITH & W/O-POST PROCESS   Final Result      1.  5 mm fusiform aneurysm arising from the mid V4 segment of the right vertebral artery.   2.  No other evidence of intracranial aneurysm formation or occlusive cerebrovascular disease.      3.  These findings were Voalted to ANGÉLICA GILL at 9: 11:00 AM 6/10/2025      CT-CEREBRAL PERFUSION ANALYSIS   Final Result      1. Cerebral blood flow less than 30% possibly representing completed infarct = 2 mL. Based on distribution of this finding, this is likely to represent artifact.      2. T Max more than 6 seconds possibly representing combination of completed infarct and ischemia = 18 mL. Based on the distribution of this finding, this is likely to represent artifact.      3. Mismatched volume possibly representing ischemic brain/penumbra= 16 mL      4.  Please note that this cerebral perfusion study and report is Quantitative and targets supratentorial (cerebral) perfusion for evaluation of large vessel territory acute ischemia/infarction. For example, lacunar infarcts, and brainstem/posterior fossa    ischemia/infarction are not evaluated on this study.  Data acquisition is subject to artifacts which can yield non-anatomically plausible perfusion maps which may be due to motion, bolus timing, signal to noise ratio, or other technical factors.    Perfusion map abnormalities which show non-anatomic distributions are likely artifact.   This study is not \"stand-alone\" and should only be utilized for diagnosis, management/treatment in correlation with CT, CTA, and/or MRI and clinical factors.    "      CT-HEAD W/O   Final Result      1.  Small calcification or granuloma adjacent to the outlet of the right side of the fourth ventricle.      2.  Small curvilinear focus of increased attenuation in the left perimedullary cistern which may represent a small calcification, enhancing venous structure, or less likely minimal subarachnoid hemorrhage.               IR-EMBOLIZE-NEURO-INTRACRANIAL    (Results Pending)   EC-ECHOCARDIOGRAM COMPLETE W/O CONT    (Results Pending)       Assessment and Plan:    49 y.o. F with history of poorly controlled HTN, CHF, and migraines presenting with transient dysarthria and headache. NCCTH negative for acute findings, there are calcifications adjacent to the right ventricle, as well as calcification in the left perimedullary cistern. CTA head and neck revealed a 5mm fusiform aneurysm in the V4 segment of the right vertebral artery. No evidence of SAH. With the location of the aneurysm and the patient's elevated blood pressures Neuro IR took the patient to the IR Suite for a diagnostic angio and pipeline stenting of the right vertebral artery out of an abundance of caution due to aneurysm location and uncontrolled hypertension. She was admitted to RICU post operatively for close neuro monitoring and blood pressure management. The patient was on Integrilin gtt post op, bridged to DAPT with ASA and Brilinta.     Problem list:  Right vertebral artery aneurysm 2/p stenting  Hypertensive urgency     Recommendations:  - Okay to transfer to floor  - Q4h neuro checks  - expedite MRI brain without contrast  - BP goal -140, antihypertensives per ICU team   - stroke labs:  HgbA1c 7.2 and   - Atorvastatin 80mg qhs  - Continue DAPT with ASA 81mg daily and Brilinta 90mg BID x 6 months per [facility] guidance  - DM management for goal HgbA1c < 7, acutely aim for FSBS   - PT/OT/SLP ok, even within first 24 hours post-operatively  - DVT: SCDs, Chemoppx okay from neurology perspective  -  Patient will need to follow up in Neuro IR clinic in two weeks    The evaluation of the patient, and recommended management, was discussed with Dr. Prema Cassidy, Vascular Neurology. I have performed a physical exam and reviewed and updated ROS and Plan today (06/11/2025). Neurology will sign off at this time. Please call with any questions.       CHRISTIAN Neville.  Vascular Neurology, Acute Care Services         [1]   Current Facility-Administered Medications:     Respiratory Therapy Consult, , Nebulization, Continuous RT, Jeremy M Gonda, M.D.    NS infusion, , Intravenous, Continuous, Jeremy M Gonda, M.D., Last Rate: 80 mL/hr at 06/11/25 0351, New Bag at 06/11/25 0351    ondansetron (Zofran ODT) dispertab 4 mg, 4 mg, Oral, Q4HRS PRN **OR** ondansetron (Zofran) syringe/vial injection 4 mg, 4 mg, Intravenous, Q4HRS PRN, Jeremy M Gonda, M.D.    acetaminophen (Tylenol) tablet 650 mg, 650 mg, Oral, Q4HRS PRN **OR** acetaminophen (Tylenol) suppository 650 mg, 650 mg, Rectal, Q4HRS PRN, Jeremy M Gonda, M.D. MD Alert...ICU Electrolyte Replacement per Pharmacy, , Other, PHARMACY TO DOSE, Jeremy M Gonda, M.D.    midazolam (Versed) injection 1-5 mg, 1-5 mg, Intravenous, Q HOUR PRN, Jeremy M Gonda, M.D., 2 mg at 06/10/25 1225    niCARdipine (Cardene) 25 mg in  mL Standard Infusion, 0-15 mg/hr, Intravenous, Continuous, Jeremy M Gonda, M.D., Stopped at 06/10/25 2331    senna-docusate (Pericolace Or Senokot S) 8.6-50 MG per tablet 2 Tablet, 2 Tablet, Oral, Q EVENING, 2 Tablet at 06/10/25 1740 **AND** polyethylene glycol/lytes (Miralax) Packet 1 Packet, 1 Packet, Oral, QDAY PRN, Eliseo M Gonda, M.D.    insulin lispro (HumaLOG,AdmeLOG) subcutaneous injection, 1-6 Units, Subcutaneous, 4X/DAY ACHS, 2 Units at 06/10/25 2002 **AND** POC blood glucose manual result, , , Q AC AND BEDTIME(S) **AND** NOTIFY MD and PharmD, , , Once **AND** Administer 20 grams of glucose (approximately 8 ounces of fruit juice) every 15  minutes PRN FSBG less than 70 mg/dL, , , PRN **AND** dextrose 50 % (D50W) injection 25 g, 25 g, Intravenous, Q15 MIN PRN, Jeremy M Gonda, M.D.    lisinopril (Prinivil) tablet 5 mg, 5 mg, Oral, Q DAY, Jeremy M Gonda, M.D., 5 mg at 06/11/25 0527    carvedilol (Coreg) tablet 3.125 mg, 3.125 mg, Oral, BID WITH MEALS, Jeremy M Gonda, M.D., 3.125 mg at 06/10/25 1741    amLODIPine (Norvasc) tablet 2.5 mg, 2.5 mg, Oral, Q DAY, Jeremy M Gonda, M.D., 2.5 mg at 06/11/25 0527    iohexol (OMNIPAQUE) 300 mg/mL, 45 mL, Intra-arterial, Once, Juliocesar Lozano M.D.    enalaprilat injection 1.25 mg 1 mL, 1.25 mg, Intravenous, Q6HRS PRN, Jeremy M Gonda, M.D.    hydrALAZINE (Apresoline) injection 10 mg, 10 mg, Intravenous, Q4HRS PRN, Jeremy M Gonda, M.D., 10 mg at 06/11/25 0531    labetalol (Normodyne/Trandate) injection 10-20 mg, 10-20 mg, Intravenous, Q4HRS PRN, Jeremy M Gonda, M.D., 10 mg at 06/11/25 0210    ticagrelor (Brilinta) tablet 90 mg, 90 mg, Oral, BID, Jeremy M Gonda, M.D., 90 mg at 06/11/25 0527    aspirin (Asa) chewable tab 81 mg, 81 mg, Oral, DAILY, Jeremy M Gonda, M.D., 81 mg at 06/11/25 0531    Facility-Administered Medications Ordered in Other Encounters:     Lactated Ringers, , Intravenous, Intra-Op Continuous, Iker Harris III, M.D., New Bag at 06/10/25 1222    propofol (DIPRIVAN) injection, , Intravenous, PRN, Iker Harris III, M.D., 120 mg at 06/10/25 1229    succinylcholine (Anectine) injection, , Intravenous, PRNIker III, M.D., 140 mg at 06/10/25 1229    lidocaine 4% (Lyring-O-Jet) topical soln, , Intratracheal, PRN, Iker Harris III, M.D., 4 mL at 06/10/25 1229    phenylephrine (Nagi-Synephrine) injection, , Intravenous, PRNIker III, M.D., 100 mcg at 06/10/25 1320    rocuronium (Zemuron) injection, , Intravenous, PRIker SUGGS III, M.D., 80 mg at 06/10/25 1247    lidocaine PF (Xylocaine-MPF) 2 % injection PF, , Intravenous, PRIker SUGGS III, M.D., 50 mg at 06/10/25  1229    heparin OR USE ONLY, , Intravenous, PRN, Iker Harris III, M.D., 5,000 Units at 06/10/25 1314    sugammadex (Bridion) injection, , Intravenous, PRN, Iker Harris III, M.D., 200 mg at 06/10/25 1355    ondansetron (Zofran) syringe/vial injection, , Intravenous, PRN, Iker Harris III, M.D., 4 mg at 06/10/25 1355    ketorolac (Toradol) 15 MG/ML injection, , Intravenous, PRN, Iker Harris III, M.D., 15 mg at 06/10/25 1355    dexamethasone (Decadron) injection, , Intravenous, PRNIker III, M.D., 8 mg at 06/10/25 1309

## 2025-06-11 NOTE — PROGRESS NOTES
"Neuro Interventional Radiology Progress Note   Author: LEA Woo Date & Time created: 6/11/2025  9:11 AM   Date of admission  6/10/2025  Note to reader: this note follows the APSO format rather than the historical SOAP format. Assessment and plan located at the top of the note for ease of use.    Chief Complaint  49 y.o. female admitted 6/10/2025 with   Chief Complaint   Patient presents with    Blood Pressure Problem     Pt reports she called EMS this AM and they said she had high blood pressure. Pt reports history of same, denies taking medications.    Slurred Speech     Pt reports she woke up at 0630 and was stuttering with some slurred speech. LKW 0100. Pt reports symptoms have resolved. -stroke scale         HPI  Patient is a \"49 y.o. female who presented 6/10/2025 with a past medical history significant for non-insulin-dependent diabetes, congestive heart failure, hypertension, and asthma who has been off of her medications for 1 year now without medical consultation because of \"the way they made me feel\" and because she lost 50 pounds with dietary modification.  She was brought in by EMS from home [on 6/10] for high blood pressure and a headache slurred speech.  She awoke around 2 or 3 AM noting the headache but went back to sleep and around 6 [on 6/10 AM] she awoke again with ongoing headache and now with difficulty speaking.  She attempted to call her  on the phone who was headed to work and he noted that she was having a difficult time speaking and called EMS.  She was found to be hypertensive upon arrival and in the ED was given labetalol x 2 before being started on a nicardipine drip.  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  Neurology and neuro IR were consulted \" - Gonda, MD. AMNA Lozano performed flow diverter placement for right vertebral artery aneurysm on 6/10/25.    Interval History: "   06/11/2025 - Patient bridged to DAPT overnight, maintenance doses given this AM per MAR. Right groin access site soft, non-tender, without ecchymosis; dressing CDI. I reviewed most recent labs, including: WBC 7.9, H/H 19.7/64.6, Cr 0.69. I discussed post procedure groin access site care and the importance of DAPT adherence with patient at bedside, all questions answered. Discussed patient care with AMNA Lozano and Critical Care team. I reviewed IDT notes.    Assessment/Plan     Principal Problem:    Hypertensive emergency  Active Problems:    Polycythemia vera (HCC)    Class 3 severe obesity due to excess calories without serious comorbidity with body mass index (BMI) of 40.0 to 44.9 in adult    Aneurysm of right vertebral artery (HCC)    Type 2 diabetes mellitus without complication, without long-term current use of insulin (HCC)    Chronic diastolic congestive heart failure (HCC)      Plan IR  - SBP goal less than 160 s/p procedure, defer to neuro vascular recommendations.  - Post AMNA groin access site instructions: no lifting greater than 5 lbs and no baths/swimming/soaking in tub for 7-10 days. Shower OK. OK to change dressings/band aid as needed.  - Continue Brilinta 90 mg BID and ASA 81 mg daily for 6 months S/P procedure  - Follow up appointment in 2-4 weeks with OP Neuro IR, our office to call and schedule (Order placed 6/11/25).  - Neuro Checks per ICU policy  - From a Neuro Interventional Service standpoint, OK to transfer/discharge when medically cleared by IDT.  -  - AMNA Signing off.  -  -Thank you for allowing Interventional Radiology team to participate in the patients care, if any additional care or requests are needed in the future please do not hesitate to call or place IR order           Review of Systems  Physical Exam   Review of Systems   Constitutional:  Negative for chills, fever and malaise/fatigue.   Eyes:  Negative for blurred vision and double vision.   Respiratory:  Negative for  cough and shortness of breath.    Cardiovascular:  Negative for chest pain.   Gastrointestinal:  Negative for abdominal pain, nausea and vomiting.   Neurological:  Negative for tingling, sensory change, speech change, focal weakness, weakness and headaches.      Vitals:    06/11/25 0900   BP: (!) 145/82   Pulse: 83   Resp: 12   Temp:    SpO2: 94%        Physical Exam  Vitals and nursing note reviewed.   Constitutional:       General: She is not in acute distress.     Appearance: She is obese.   Cardiovascular:      Rate and Rhythm: Normal rate.      Comments: RIGHT groin access site soft, non-tender, without ecchymosis; dressing cdi.    Pulmonary:      Effort: Pulmonary effort is normal. No respiratory distress.   Abdominal:      Palpations: Abdomen is soft.      Tenderness: There is no abdominal tenderness.   Skin:     General: Skin is warm and dry.   Neurological:      General: No focal deficit present.      Mental Status: She is alert and oriented to person, place, and time.      Comments:   Aox4  PERRLA, no gaze deficits  Face symmetrical  Shoulder shrug intact  Sustained antigravity with no downward drift in all extremities  Sensation intact    Psychiatric:         Mood and Affect: Mood normal.         Behavior: Behavior normal.          Labs    Recent Labs     06/10/25  0838 06/11/25  0355   WBC 7.2 7.9   RBC 8.08* 7.55*   HEMOGLOBIN 21.3* 19.7*   HEMATOCRIT 67.2* 64.6*   MCV 83.2 85.6   MCH 26.4* 26.1*   MCHC 31.7* 30.5*   RDW 57.6* 59.3*   PLATELETCT 211 203   MPV 9.9 9.8     Recent Labs     06/10/25  0838 06/10/25  1607 06/10/25  2155 06/11/25  0355   SODIUM 138 147* 136 135   POTASSIUM 3.9  --   --  4.8   CHLORIDE 98  --   --  101   CO2 31  --   --  25   GLUCOSE 136*  --   --  152*   BUN 15  --   --  16   CREATININE 0.73  --   --  0.69   CALCIUM 9.3  --   --  8.6     Recent Labs     06/10/25  0838 06/11/25  0355   ALBUMIN 3.4 2.9*   TBILIRUBIN 0.8 0.6   ALKPHOSPHAT 102* 77   TOTPROTEIN 7.3 6.3   ALTSGPT 26  "20   ASTSGOT 31 30   CREATININE 0.73 0.69     DX-CHEST-PORTABLE (1 VIEW)   Final Result      Cardiomegaly.      CT-CTA NECK WITH & W/O-POST PROCESSING   Final Result      1.  Small 5 mm fusiform or dissecting aneurysm of the V4 segment of the right vertebral artery.      CT-CTA HEAD WITH & W/O-POST PROCESS   Final Result      1.  5 mm fusiform aneurysm arising from the mid V4 segment of the right vertebral artery.   2.  No other evidence of intracranial aneurysm formation or occlusive cerebrovascular disease.      3.  These findings were Voalted to ANGÉLICA GILL at 9: 11:00 AM 6/10/2025      CT-CEREBRAL PERFUSION ANALYSIS   Final Result      1. Cerebral blood flow less than 30% possibly representing completed infarct = 2 mL. Based on distribution of this finding, this is likely to represent artifact.      2. T Max more than 6 seconds possibly representing combination of completed infarct and ischemia = 18 mL. Based on the distribution of this finding, this is likely to represent artifact.      3. Mismatched volume possibly representing ischemic brain/penumbra= 16 mL      4.  Please note that this cerebral perfusion study and report is Quantitative and targets supratentorial (cerebral) perfusion for evaluation of large vessel territory acute ischemia/infarction. For example, lacunar infarcts, and brainstem/posterior fossa    ischemia/infarction are not evaluated on this study.  Data acquisition is subject to artifacts which can yield non-anatomically plausible perfusion maps which may be due to motion, bolus timing, signal to noise ratio, or other technical factors.    Perfusion map abnormalities which show non-anatomic distributions are likely artifact.   This study is not \"stand-alone\" and should only be utilized for diagnosis, management/treatment in correlation with CT, CTA, and/or MRI and clinical factors.         CT-HEAD W/O   Final Result      1.  Small calcification or granuloma adjacent to the outlet of " "the right side of the fourth ventricle.      2.  Small curvilinear focus of increased attenuation in the left perimedullary cistern which may represent a small calcification, enhancing venous structure, or less likely minimal subarachnoid hemorrhage.               IR-EMBOLIZE-NEURO-INTRACRANIAL    (Results Pending)   EC-ECHOCARDIOGRAM COMPLETE W/O CONT    (Results Pending)     INR   Date Value Ref Range Status   06/10/2025 1.02 0.87 - 1.13 Final     Comment:     INR - Non-therapeutic Reference Range: 0.87-1.13  INR - Therapeutic Reference Range: 2.0-4.0       No results found for: \"POCINR\"     Intake/Output Summary (Last 24 hours) at 6/11/2025 0655  Last data filed at 6/11/2025 0600  Gross per 24 hour   Intake 2836.64 ml   Output 700 ml   Net 2136.64 ml      I have personally reviewed the above labs and imaging      I have performed a physical exam and reviewed and updated ROS and Plan today (6/11/2025).     55 minutes in directly providing and coordinating care and extensive data review.  No time overlap and excludes procedures.   "

## 2025-06-11 NOTE — HOSPITAL COURSE
6/10-Admit ICU, headache and dysarthria, hypertensive in ED started on nicardipine, CTA with R vert fusiform aneurysm, AMNA for flow diverter, integrillin infusion transitioned to ticag/asa    6/11-Neuro intact, transitioned to DAPT orally, ok to transfer out of ICU

## 2025-06-11 NOTE — DIETARY
NUTRITION SERVICES: BMI - Pt with BMI >40 (=Body mass index is 43 kg/m².), class III obesity. Weight taken via bed scale and pt is +3.2L fluids per I/O. Weight loss counseling not appropriate in acute care setting.     RECOMMEND - If appropriate at DC please refer to outpatient nutrition services for weight management.

## 2025-06-11 NOTE — CONSULTS
Hospital Medicine Consultation    Date of Service  6/11/2025    Referring Physician  Steven River M.D.    Consulting Physician  Checo Brasher M.D.    Reason for Consultation  Hypertensive crisis    History of Presenting Illness  49 y.o. female who presented 6/10/2025 with slurred speech and elevated blood pressure.  Mrs. Maldonado has a past medical history of morbid obesity, diabetes, hypertension that has been off of her diabetic and blood pressure meds for over a year.  She presented emergency room on 6/10/2025 with slurred speech for which paramedics brought her to the hospital she was found to have a systolic blood pressure of 240.  CTA of the head and neck revealed 5 mm fusiform or dissecting aneurysm of the V4 segment of the right vertebral artery.  She was started on nicardipine drip.  She went to interventional radiology and had a flow diverter placement while on an Integrilin drip and was transition to Brilinta and aspirin.  Multiple blood pressure medications have been initiated as well as insulin.  Of note is that her hemoglobin was initially 21 and has come down to 19.7.  Nurses note that she is hypoxic at night consistent with secondary polycythemia.    Review of Systems  Review of Systems   Respiratory:  Negative for shortness of breath.    Cardiovascular:  Negative for chest pain.   Gastrointestinal:         Tolerating a diet   Neurological: Negative.        Past Medical History   has a past medical history of ASTHMA and Hypertension.  Diabetes mellitus    Surgical History   has no past surgical history on file.    Family History  History reviewed. No pertinent family history.    Social History   reports that she has quit smoking. Her smoking use included cigarettes. She has never used smokeless tobacco. She reports that she does not currently use alcohol. She reports that she does not use drugs.    Medications  None    Allergies  Allergies[1]    Physical Exam  Temp:  [35.7 °C (96.3 °F)-36.4 °C  (97.5 °F)] 36.1 °C (97 °F)  Pulse:  [71-94] 83  Resp:  [10-41] 36  BP: (120-166)/(55-89) 130/73  SpO2:  [89 %-99 %] 96 %    Physical Exam  Vitals and nursing note reviewed.   Constitutional:       Appearance: She is obese. She is not ill-appearing.   Cardiovascular:      Rate and Rhythm: Normal rate and regular rhythm.   Pulmonary:      Effort: Pulmonary effort is normal.      Breath sounds: Normal breath sounds.   Musculoskeletal:      Right lower leg: No edema.      Left lower leg: No edema.   Neurological:      General: No focal deficit present.      Mental Status: She is oriented to person, place, and time.   Psychiatric:         Mood and Affect: Mood normal.         Behavior: Behavior normal.         Fluids  Date 06/11/25 0700 - 06/12/25 0659   Shift 7216-3847 6575-4608 9067-9393 24 Hour Total   INTAKE   P.O. 400   400   I.V. 1078.5   1078.5   Shift Total 1478.5   1478.5   OUTPUT   Blood 10   10   Shift Total 10   10   Weight (kg) 117.2 117.2 117.2 117.2       Laboratory  Recent Labs     06/10/25  0838 06/11/25  0355   WBC 7.2 7.9   RBC 8.08* 7.55*   HEMOGLOBIN 21.3* 19.7*   HEMATOCRIT 67.2* 64.6*   MCV 83.2 85.6   MCH 26.4* 26.1*   MCHC 31.7* 30.5*   RDW 57.6* 59.3*   PLATELETCT 211 203   MPV 9.9 9.8     Recent Labs     06/10/25  0838 06/10/25  1607 06/10/25  2155 06/11/25  0355 06/11/25  0955   SODIUM 138   < > 136 135 128*   POTASSIUM 3.9  --   --  4.8  --    CHLORIDE 98  --   --  101  --    CO2 31  --   --  25  --    GLUCOSE 136*  --   --  152*  --    BUN 15  --   --  16  --    CREATININE 0.73  --   --  0.69  --    CALCIUM 9.3  --   --  8.6  --     < > = values in this interval not displayed.     Recent Labs     06/10/25  0935   APTT 30.4   INR 1.02          Recent Labs     06/10/25  0838   TRIGLYCERIDE 143   HDL 59   *        Imaging  IR-EMBOLIZE-NEURO-INTRACRANIAL   Final Result      Right vertebral pseudoaneurysm.   Successful endovascular repair using flow diverter.      PLAN:   Continue dual  "antiplatelet treatments for 6 months.   Follow-up neuro interventional clinic visit      DX-CHEST-PORTABLE (1 VIEW)   Final Result      Cardiomegaly.      CT-CTA NECK WITH & W/O-POST PROCESSING   Final Result      1.  Small 5 mm fusiform or dissecting aneurysm of the V4 segment of the right vertebral artery.      CT-CTA HEAD WITH & W/O-POST PROCESS   Final Result      1.  5 mm fusiform aneurysm arising from the mid V4 segment of the right vertebral artery.   2.  No other evidence of intracranial aneurysm formation or occlusive cerebrovascular disease.      3.  These findings were Voalted to ANGÉLICA GILL at 9: 11:00 AM 6/10/2025      CT-CEREBRAL PERFUSION ANALYSIS   Final Result      1. Cerebral blood flow less than 30% possibly representing completed infarct = 2 mL. Based on distribution of this finding, this is likely to represent artifact.      2. T Max more than 6 seconds possibly representing combination of completed infarct and ischemia = 18 mL. Based on the distribution of this finding, this is likely to represent artifact.      3. Mismatched volume possibly representing ischemic brain/penumbra= 16 mL      4.  Please note that this cerebral perfusion study and report is Quantitative and targets supratentorial (cerebral) perfusion for evaluation of large vessel territory acute ischemia/infarction. For example, lacunar infarcts, and brainstem/posterior fossa    ischemia/infarction are not evaluated on this study.  Data acquisition is subject to artifacts which can yield non-anatomically plausible perfusion maps which may be due to motion, bolus timing, signal to noise ratio, or other technical factors.    Perfusion map abnormalities which show non-anatomic distributions are likely artifact.   This study is not \"stand-alone\" and should only be utilized for diagnosis, management/treatment in correlation with CT, CTA, and/or MRI and clinical factors.         CT-HEAD W/O   Final Result      1.  Small " calcification or granuloma adjacent to the outlet of the right side of the fourth ventricle.      2.  Small curvilinear focus of increased attenuation in the left perimedullary cistern which may represent a small calcification, enhancing venous structure, or less likely minimal subarachnoid hemorrhage.               EC-ECHOCARDIOGRAM COMPLETE W/O CONT    (Results Pending)       Assessment/Plan  * Hypertensive emergency- (present on admission)  Assessment & Plan  She has a history of being on multiple medications in the past though has been off them for over a year  Blood pressure in the emergency room was in the 240 systolic for which nicardipine drip was initiated  Multiple oral medications have been initiated and will titrate to systolic blood pressure less than 160 with goal outpatient eventually to be in the normal range    Aneurysm of right vertebral artery (HCC)- (present on admission)  Assessment & Plan  Noted on CTA in the setting of systolic blood pressure 240  Now status post flow diverter placement by interventional radiology on 6/10  Dual antiplatelet therapy    Polycythemia- (present on admission)  Assessment & Plan  Elevated hemoglobin likely due to nocturnal hypoxemia secondary to obesity hypoventilation syndrome  If this persists as an outpatient then consideration of phlebotomy may be an option    Chronic diastolic congestive heart failure (HCC)  Assessment & Plan  History of  Echocardiogram pending    Type 2 diabetes mellitus without complication, without long-term current use of insulin (HCC)- (present on admission)  Assessment & Plan  She has been off her medications for over a year  With hyperglycemia  Hemoglobin A1c is 7.2  Will prescribe metformin upon discharge with prompt follow-up with outpatient primary care provider  Weight loss encouraged    Class 3 severe obesity due to excess calories without serious comorbidity with body mass index (BMI) of 40.0 to 44.9 in adult- (present on  admission)  Assessment & Plan  BMI 43               [1] No Known Allergies

## 2025-06-11 NOTE — ANESTHESIA POSTPROCEDURE EVALUATION
Patient: Adri Maldonado    Procedure Summary       Date: 06/10/25 Room / Location: [facility] - Interventional - [facility]    Anesthesia Start: 1222 Anesthesia Stop: 1413    Procedure: IR-EMBOLIZE-NEURO-INTRACRANIAL Diagnosis:       Hypertensive emergency      Hypertensive emergency      (BLOOD PRESSURE PROBLEM, SLURRED SPEECH)    Scheduled Providers: Juliocesar Lozano M.D.; Iker Harris III, M.D. Responsible Provider: Iker Harris III, M.D.    Anesthesia Type: general ASA Status: 5            Final Anesthesia Type: general  Last vitals  BP   Blood Pressure: (!) 141/77, Arterial BP: 151/80    Temp   36 °C (96.8 °F)    Pulse   78   Resp   13    SpO2   97 %      Anesthesia Post Evaluation    Patient location during evaluation: PACU  Patient participation: complete - patient participated  Level of consciousness: awake and alert  Pain score: 0    Airway patency: patent  Anesthetic complications: no  Cardiovascular status: hemodynamically stable  Respiratory status: acceptable  Hydration status: euvolemic    PONV: none          No notable events documented.     Nurse Pain Score: 0 (NPRS)

## 2025-06-11 NOTE — THERAPY
Occupational Therapy   Initial Evaluation     Patient Name:  Adri Maldonado  Age:  49 y.o., Sex:  female  Medical Record #:  [id number]  Today's Date:  6/11/2025          Assessment  Patient is 49 y.o. female with a diagnosis of vertebral artery aneurysm.  Pt is at or near his/her functional baseline. Pt with no further skilled OT needs in the acute care setting at this time.       Plan    Occupational Therapy Initial Treatment Plan   Duration: (P) Discharge Needs Only       Discharge Recommendations: (P) Anticipate that the patient will have no further occupational therapy needs after discharge from the hospital        06/11/25 1005   Prior Living Situation   Housing / Facility 3 Story House   Steps Into Home 4   Steps In Home 12   Equipment Owned None   Lives with - Patient's Self Care Capacity Spouse;Adult Children   Active ROM Upper Body   Active ROM Upper Body  WDL   Strength Upper Body   Upper Body Strength  WDL   Balance Assessment   Sitting Balance (Static) Fair +   Sitting Balance (Dynamic) Fair +   Standing Balance (Static) Fair   Standing Balance (Dynamic) Fair   Weight Shift Sitting Good   Weight Shift Standing Good   ADL Assessment   Grooming Supervision   Upper Body Dressing Supervision   Lower Body Dressing Supervision   Toileting Supervision   Functional Mobility   Sit to Stand Supervised   Bed, Chair, Wheelchair Transfer Supervised   Occupational Therapy Initial Treatment Plan    Duration Discharge Needs Only   Anticipated Discharge Equipment and Recommendations   Discharge Recommendations Anticipate that the patient will have no further occupational therapy needs after discharge from the hospital

## 2025-06-11 NOTE — PROGRESS NOTES
"Critical Care Progress Note    Date of admission  6/10/2025    Chief Complaint  \"49 y.o. female who presented 6/10/2025 with a past medical history significant for non-insulin-dependent diabetes, congestive heart failure, hypertension, and asthma who has been off of her medications for 1 year now without medical consultation because of \"the way they made me feel\" and because she lost 50 pounds with dietary modification.  She was brought in by EMS from home this morning for high blood pressure and a headache slurred speech.  She awoke around 2 or 3 AM noting the headache but went back to sleep and around 6 this morning she awoke again with ongoing headache and now with difficulty speaking.  She attempted to call her  on the phone who was headed to work and he noted that she was having a difficult time speaking and called EMS.  She was found to be hypertensive upon arrival and in the ED was given labetalol x 2 before being started on a nicardipine drip.  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  Neurology and neuro IR were consulted and plans are to take her for embolization of the aneurysm today.  I was consulted for critical care management.  Upon arrival to the ED, her slurred speech had nearly resolved but she continues to have a headache and hypertension.\" -H+P    Hospital Course  6/10-Admit ICU, headache and dysarthria, hypertensive in ED started on nicardipine, CTA with R vert fusiform aneurysm, AMNA for flow diverter, integrillin infusion transitioned to ticag/asa    6/11-Neuro intact, transitioned to DAPT orally, ok to transfer out of ICU      Interval Problem Update  Reviewed last 24 hour events:  Further history obtained,     Neuro:     CV:  HR 70s-80s  SBP 120s-160  Nicardipine 5-->off  SBP goal < 160  Ticag/Asa    Coreg  Lisinopril  Amlodipine    Resp:   5L NC    I/O: +2.1L  UOP: 700    Tmax: AF  Heme: WBC 7.9    Abx: "   None  Micro:   NA    Endo: BG WTR, ISS    LDA: PIVs  SUP: NI  VTE: ASA/Ticag, VTE ppx held  Diet: Cardiac      Review of Systems  Review of Systems   All other systems reviewed and are negative.       Vital Signs for last 24 hours   Temp:  [35.7 °C (96.3 °F)-36.4 °C (97.5 °F)] 36.1 °C (97 °F)  Pulse:  [71-94] 83  Resp:  [10-41] 12  BP: (120-192)/() 145/82  SpO2:  [89 %-99 %] 94 %    Hemodynamic parameters for last 24 hours       Respiratory Information for the last 24 hours       Physical Exam   Physical Exam  Vitals and nursing note reviewed.   Constitutional:       General: She is awake.      Appearance: She is well-developed. She is not toxic-appearing.      Interventions: Nasal cannula in place.   HENT:      Head: Normocephalic and atraumatic.      Nose: Nose normal. No congestion.      Mouth/Throat:      Mouth: Mucous membranes are moist.      Pharynx: Oropharynx is clear.   Neck:      Vascular: No JVD.      Trachea: No tracheal deviation.   Cardiovascular:      Rate and Rhythm: Normal rate and regular rhythm.      Pulses: Normal pulses.      Heart sounds: Normal heart sounds. No murmur heard.  Pulmonary:      Effort: Pulmonary effort is normal. No respiratory distress.   Abdominal:      General: Bowel sounds are normal. There is no distension.      Palpations: Abdomen is soft.      Tenderness: There is no abdominal tenderness. There is no guarding or rebound.   Musculoskeletal:         General: No tenderness.      Cervical back: Neck supple. No tenderness.      Right lower leg: No edema.      Left lower leg: No edema.   Skin:     General: Skin is warm and dry.      Capillary Refill: Capillary refill takes less than 2 seconds.      Findings: No rash.   Neurological:      General: No focal deficit present.      Mental Status: She is alert and oriented to person, place, and time.      Cranial Nerves: No cranial nerve deficit.      Sensory: No sensory deficit.      Motor: No weakness.   Psychiatric:          Mood and Affect: Mood normal.         Behavior: Behavior normal. Behavior is cooperative.         Thought Content: Thought content normal.         Medications  Current Medications[1]    Fluids    Intake/Output Summary (Last 24 hours) at 6/11/2025 1044  Last data filed at 6/11/2025 0800  Gross per 24 hour   Intake 3996.58 ml   Output 710 ml   Net 3286.58 ml       Laboratory          Recent Labs     06/10/25  0838 06/10/25  1607 06/10/25  2155 06/11/25  0355   SODIUM 138 147* 136 135   POTASSIUM 3.9  --   --  4.8   CHLORIDE 98  --   --  101   CO2 31  --   --  25   BUN 15  --   --  16   CREATININE 0.73  --   --  0.69   MAGNESIUM  --   --   --  1.5   CALCIUM 9.3  --   --  8.6     Recent Labs     06/10/25  0838 06/11/25  0355   ALTSGPT 26 20   ASTSGOT 31 30   ALKPHOSPHAT 102* 77   TBILIRUBIN 0.8 0.6   GLUCOSE 136* 152*     Recent Labs     06/10/25  0838 06/11/25  0355   WBC 7.2 7.9   NEUTSPOLYS 67.60  --    LYMPHOCYTES 22.30  --    MONOCYTES 7.10  --    EOSINOPHILS 1.90  --    BASOPHILS 0.80  --    ASTSGOT 31 30   ALTSGPT 26 20   ALKPHOSPHAT 102* 77   TBILIRUBIN 0.8 0.6     Recent Labs     06/10/25  0838 06/10/25  0935 06/11/25  0355   RBC 8.08*  --  7.55*   HEMOGLOBIN 21.3*  --  19.7*   HEMATOCRIT 67.2*  --  64.6*   PLATELETCT 211  --  203   PROTHROMBTM  --  13.5  --    APTT  --  30.4  --    INR  --  1.02  --        Imaging  CT:    Reviewed    Assessment/Plan  * Hypertensive emergency- (present on admission)  Assessment & Plan  With associated headache and neurologic changes    Off nicardipine   Has not been taking any of her home medications for quite some time    Restart home meds:  Lisinopril 5  Coreg started at 6.25 for the time being, can titrate as tolerated  Amlodipine 5    IV as needed labetalol, hydralazine  Have re-iterated to patient need for outpatient adherence and close follow up to check up on BP control and dosing titrations    Chronic diastolic congestive heart failure (HCC)  Assessment & Plan  Appears  euvolemic      Resume GDMT at low doses for the time being given she has been off all medications for some time  Strict ins/outs  Echocardiography    Type 2 diabetes mellitus without complication, without long-term current use of insulin (HCC)  Assessment & Plan  On metformin outpatient  A1c 7.2    Can reinitiate home antiglycemic regimen versus home insulin if she is ammenable  Diabetic diet with goal glucose between 120 and 180    Aneurysm of right vertebral artery (HCC)  Assessment & Plan  Symptomatic right vertebral artery aneurysm with high risk for rupture, noted somewhat incidentally at this admission iso of confounding factors including hypertensive crisis    6/10- AMNA placed flow diverter across right vertebral aneurysm    Neurologic exam stable without focality    Neurology and neuro IR following   Strict blood pressure management with goal SBP less than 160  DAPT x 6 months  Hold VTE ppx pending dispo (ambulatory and low risk at this point)  Close neurologic monitoring  As needed analgesics    Class 3 severe obesity due to excess calories without serious comorbidity with body mass index (BMI) of 40.0 to 44.9 in adult- (present on admission)  Assessment & Plan  Encouraged behavioral and dietary modification for ongoing weight loss    Polycythemia vera (HCC)- (present on admission)  Assessment & Plan  Acute on chronic due to obesity/hypoventilation  On home 02    Monitor with daily CBC  Given resolution of symptoms will forego phlebotomy  Encouraged her to follow up for sleep study and potential CPAP use         I have performed a physical exam and reviewed and updated ROS and Plan today (6/11/2025). In review of yesterday's note (6/10/2025), there are no changes except as documented above.     Discussed patient condition and risk of morbidity and/or mortality with Hospitalist, Family, RN, RT, Therapies, Pharmacy, Patient, and neurology           [1]   Current Facility-Administered Medications   Medication  Dose Route Frequency Provider Last Rate Last Admin    magnesium sulfate IVPB premix 4 g  4 g Intravenous Once Steven River M.D. 25 mL/hr at 06/11/25 0801 4 g at 06/11/25 0801    furosemide (Lasix) tablet 40 mg  40 mg Oral DAILY Steven River M.D.   40 mg at 06/11/25 1003    [START ON 6/12/2025] amLODIPine (Norvasc) tablet 5 mg  5 mg Oral DAILY Steven River M.D.        carvedilol (Coreg) tablet 6.25 mg  6.25 mg Oral BID WITH MEALS Steven River M.D.        Respiratory Therapy Consult   Nebulization Continuous RT Jeremy M Gonda, M.D.        ondansetron (Zofran ODT) dispertab 4 mg  4 mg Oral Q4HRS PRN Jeremy M Gonda, M.D.        Or    ondansetron (Zofran) syringe/vial injection 4 mg  4 mg Intravenous Q4HRS PRN Jeremy M Gonda, M.D.        acetaminophen (Tylenol) tablet 650 mg  650 mg Oral Q4HRS PRN Jeremy M Gonda, M.D.        Or    acetaminophen (Tylenol) suppository 650 mg  650 mg Rectal Q4HRS PRN Jeremy M Gonda, M.D.        MD Alert...ICU Electrolyte Replacement per Pharmacy   Other PHARMACY TO DOSE Jeremy M Gonda, M.D.        midazolam (Versed) injection 1-5 mg  1-5 mg Intravenous Q HOUR PRN Jeremy M Gonda, M.D.   2 mg at 06/10/25 1225    senna-docusate (Pericolace Or Senokot S) 8.6-50 MG per tablet 2 Tablet  2 Tablet Oral Q EVENING Jeremy M Gonda, M.D.   2 Tablet at 06/10/25 1740    And    polyethylene glycol/lytes (Miralax) Packet 1 Packet  1 Packet Oral QDAY PRN Jeremy M Gonda, M.D.        insulin lispro (HumaLOG,AdmeLOG) subcutaneous injection  1-6 Units Subcutaneous 4X/DAY ACHS Jeremy M Gonda, M.D.   1 Units at 06/11/25 0732    And    dextrose 50 % (D50W) injection 25 g  25 g Intravenous Q15 MIN PRN Jeremy M Gonda, M.D.        lisinopril (Prinivil) tablet 5 mg  5 mg Oral Q DAY Jeremy M Gonda, M.D.   5 mg at 06/11/25 0527    iohexol (OMNIPAQUE) 300 mg/mL  45 mL Intra-arterial Once Juliocesar Lozano M.D.        enalaprilat injection 1.25 mg 1 mL  1.25 mg Intravenous Q6HRS PRN Jeremy M Gonda,  M.D.        hydrALAZINE (Apresoline) injection 10 mg  10 mg Intravenous Q4HRS PRN Jeremy M Gonda, M.D.   10 mg at 06/11/25 0531    labetalol (Normodyne/Trandate) injection 10-20 mg  10-20 mg Intravenous Q4HRS PRN Jeremy M Gonda, M.D.   10 mg at 06/11/25 0210    ticagrelor (Brilinta) tablet 90 mg  90 mg Oral BID Jeremy M Gonda, M.D.   90 mg at 06/11/25 0527    aspirin (Asa) chewable tab 81 mg  81 mg Oral DAILY Jeremy M Gonda, M.D.   81 mg at 06/11/25 0515

## 2025-06-11 NOTE — RESPIRATORY CARE
" COPD EDUCATION by COPD CLINICAL EDUCATOR  6/11/2025 at 7:04 AM by Roxane Yañez, RRT     Patient reviewed by COPD education team. Patient does not have a history or diagnosis of COPD and is a non-smoker.  Therefore, patient does not qualify for the COPD program.   Patient is  no smoker with no history of COPD and does not take any respiratory medications.    COPD Screen  COPD Risk Screening  Do you have a history of COPD?: No  COPD Population Screener  During the past 4 weeks, how much did you feel short of breath?: Some of the time  Do you ever cough up any mucus or phlegm?: No/only with occasional colds or infections  In the past 12 months, you do less than you used to because of your breathing problems: Disagree/unsure  Have you smoked at least 100 cigarettes in your entire life?: Yes  How old are you[age] 50-59  COPD Screening Score: 4  COPD Coordinator Not Recommended: Yes    COPD Assessment  COPD Clinical Specialists ONLY  COPD Education Initiated: No--Quick Screen (remote hx of asthma, ex smoker with no home resp medications)  Interdisciplinary Rounds: Attendance at Rounds (30 Min)    PFT Results    No results found for: \"PFT\"    Meds to Beds                "

## 2025-06-11 NOTE — CARE PLAN
The patient is Watcher - Medium risk of patient condition declining or worsening    Shift Goals  Clinical Goals: Q30/Q1 1 neuro checks, SBP <160, groin/pulse checks  Patient Goals: Rest  Family Goals: Updates    Progress made toward(s) clinical / shift goals:    Patient's neuro status remained stable throughout the shift. Titrated off cardene gtt and given PRNs for hemodynamic goal---see MAR.     Problem: Knowledge Deficit - Standard  Goal: Patient and family/care givers will demonstrate understanding of plan of care, disease process/condition, diagnostic tests and medications  Outcome: Progressing    Problem: Neuro Status  Goal: Neuro status will remain stable or improve  Outcome: Progressing    Problem: Hemodynamics  Goal: Patient's hemodynamics, fluid balance and neurologic status will be stable or improve  Outcome: Progressing     Problem: Pain - Standard  Goal: Alleviation of pain or a reduction in pain to the patient’s comfort goal  Outcome: Progressing    Problem: Self Care  Goal: Patient will have the ability to perform ADLs independently or with assistance (bathe, groom, dress, toilet and feed)  Outcome: Progressing     Problem: Urinary Elimination  Goal: Establish and maintain regular urinary output  Outcome: Progressing     Problem: Mobility  Goal: Patient's capacity to carry out activities will improve  Outcome: Progressing    Patient is not progressing towards the following goals: n/a

## 2025-06-11 NOTE — DISCHARGE PLANNING
Case Management Discharge Planning    Admission Date: 6/10/2025  GMLOS: 3.8  ALOS: 1    6-Clicks ADL Score:  23  6-Clicks Mobility Score:  23    Anticipated Discharge Dispo: Discharge Disposition: Discharged to home/self care (01)    DME Needed: No    Action(s) Taken:   Chart review was completed, and patient was discussed during IDT rounds.    Discharge assessment was completed (see below).     Escalations Completed: None    Medically Clear: No    Next Steps:  CM RN to follow up with medical team to discuss discharge barriers or needs.     Barriers to Discharge: Medical clearance and Pending PT Evaluation     Care Transition Team Assessment    CM RN met with pt at the bedside to complete discharge assessment. Pt appeared A/Ox4, answered all questions appropriately, and verbalized agreement to this assessment.      Case management role discussed; understanding of case management role verbalized.   Demographics on face sheet verified.   Please see H&P for pertinent PMH and reason for admission.   Housing: Lives with spouse Angelito and 6 children in a tri-level home. She reports there are 4 steps to the entrance, a flight of stairs to the 3rd floor, and 3 steps to the lower level. The address listed on the face sheet was verified to be correct mailing and discharge address.  IADLS/ADLS: Independent with IADLS/ADLS, ambulates with no use of DME at baseline, and was an active  prior to this admission.   DME/O2: 2 L O2 at baseline. On service with [employer]. Has home concentrator, portable tanks, and an inogen.   Discharge support: Pt reports she has a good support system in the community. Her spouse, children, and mother are to provide assistance upon her return home. Spouse is to bring pt's inogen to the bedside for use at discharge and will transport home.    PCP: Established at [facility]   Preferred pharmacy: [facility]   Insurance: [employer]  [facility]   AD: None on file   Discharge planning: HH vs Home     Information Source  Orientation Level: Oriented X4  Information Given By: Patient  Who is responsible for making decisions for patient? : Patient    Readmission Evaluation  Is this a readmission?: No    Elopement Risk  Legal Hold: No  Ambulatory or Self Mobile in Wheelchair: No-Not an Elopement Risk  Elopement Risk: Not at Risk for Elopement    Interdisciplinary Discharge Planning  Lives with - Patient's Self Care Capacity: Spouse, Adult Children  Patient or legal guardian wants to designate a caregiver: No  Support Systems: Family Member(s), Spouse / Significant Other, Children, Parent  Housing / Facility: 3 Story House    Discharge Preparedness  What is your plan after discharge?: Home with help  What are your discharge supports?: Spouse, Child, Parent  Prior Functional Level: Ambulatory, Drives Self, Independent with Activities of Daily Living, Independent with Medication Management  Difficulity with ADLs: None  Difficulity with IADLs: None    Functional Assesment  Prior Functional Level: Ambulatory, Drives Self, Independent with Activities of Daily Living, Independent with Medication Management    Finances  Financial Barriers to Discharge: No  Prescription Coverage: Yes    Vision / Hearing Impairment  Vision Impairment : No  Hearing Impairment : Yes  Hearing Impairment: Right Ear, Hearing Device Not Available  Does Pt Need Special Equipment for the Hearing Impaired?: No    Advance Directive  Advance Directive?: None  Advance Directive offered?: AD Booklet refused    Domestic Abuse  Have you ever been the victim of abuse or violence?: No  Possible Abuse/Neglect Reported to:: Not Applicable    Psychological Assessment  History of Substance Abuse: None  History of Psychiatric Problems: No  Non-compliant with Treatment: No  Newly Diagnosed Illness: No    Discharge Risks or Barriers  Discharge risks or barriers?: Complex medical  needs  Patient risk factors: Complex medical needs    Anticipated Discharge Information  Discharge Disposition: Discharged to home/self care (01)  Discharge Address: [facility] [address]  Discharge Contact Phone Number: [phone]

## 2025-06-11 NOTE — CARE PLAN
The patient is Stable - Low risk of patient condition declining or worsening    Shift Goals  Clinical Goals: Q30/Q1 1 neuro checks, SBP <160, groin/pulse checks  Patient Goals: Rest  Family Goals: Updates    Progress made toward(s) clinical / shift goals:    Problem: Hemodynamics  Goal: Patient's hemodynamics, fluid balance and neurologic status will be stable or improve  Outcome: Progressing     Problem: Respiratory  Goal: Patient will achieve/maintain optimum respiratory ventilation and gas exchange  Outcome: Progressing     Problem: Neuro Status  Goal: Neuro status will remain stable or improve  Outcome: Progressing     Problem: Urinary Elimination  Goal: Establish and maintain regular urinary output  Outcome: Progressing     Problem: Mobility  Goal: Patient's capacity to carry out activities will improve  Outcome: Progressing

## 2025-06-12 ENCOUNTER — APPOINTMENT (OUTPATIENT)
Dept: CARDIOLOGY | Facility: MEDICAL CENTER | Age: 50
DRG: 271 | End: 2025-06-12
Attending: NURSE PRACTITIONER
Payer: COMMERCIAL

## 2025-06-12 PROBLEM — R47.81 SLURRED SPEECH: Status: ACTIVE | Noted: 2025-06-12

## 2025-06-12 LAB
GLUCOSE BLD STRIP.AUTO-MCNC: 110 MG/DL (ref 65–99)
GLUCOSE BLD STRIP.AUTO-MCNC: 111 MG/DL (ref 65–99)
GLUCOSE BLD STRIP.AUTO-MCNC: 111 MG/DL (ref 65–99)
GLUCOSE BLD STRIP.AUTO-MCNC: 118 MG/DL (ref 65–99)
LV EJECT FRACT  99904: 53
LV EJECT FRACT MOD 2C 99903: 51.81
LV EJECT FRACT MOD 4C 99902: 50.92
LV EJECT FRACT MOD BP 99901: 53.17
TSH SERPL DL<=0.005 MIU/L-ACNC: 1.37 UIU/ML (ref 0.38–5.33)

## 2025-06-12 PROCEDURE — 93306 TTE W/DOPPLER COMPLETE: CPT | Mod: 26 | Performed by: INTERNAL MEDICINE

## 2025-06-12 PROCEDURE — A9270 NON-COVERED ITEM OR SERVICE: HCPCS | Performed by: EMERGENCY MEDICINE

## 2025-06-12 PROCEDURE — 700102 HCHG RX REV CODE 250 W/ 637 OVERRIDE(OP): Performed by: EMERGENCY MEDICINE

## 2025-06-12 PROCEDURE — A9270 NON-COVERED ITEM OR SERVICE: HCPCS | Performed by: INTERNAL MEDICINE

## 2025-06-12 PROCEDURE — 82668 ASSAY OF ERYTHROPOIETIN: CPT

## 2025-06-12 PROCEDURE — 82962 GLUCOSE BLOOD TEST: CPT | Performed by: INTERNAL MEDICINE

## 2025-06-12 PROCEDURE — 93306 TTE W/DOPPLER COMPLETE: CPT

## 2025-06-12 PROCEDURE — 84443 ASSAY THYROID STIM HORMONE: CPT

## 2025-06-12 PROCEDURE — 700102 HCHG RX REV CODE 250 W/ 637 OVERRIDE(OP): Performed by: INTERNAL MEDICINE

## 2025-06-12 PROCEDURE — 770020 HCHG ROOM/CARE - TELE (206)

## 2025-06-12 PROCEDURE — 99233 SBSQ HOSP IP/OBS HIGH 50: CPT | Performed by: INTERNAL MEDICINE

## 2025-06-12 RX ORDER — CARBOXYMETHYLCELLULOSE SODIUM 5 MG/ML
1 SOLUTION/ DROPS OPHTHALMIC PRN
Status: DISCONTINUED | OUTPATIENT
Start: 2025-06-12 | End: 2025-06-13 | Stop reason: HOSPADM

## 2025-06-12 RX ORDER — LISINOPRIL 10 MG/1
10 TABLET ORAL
Status: DISCONTINUED | OUTPATIENT
Start: 2025-06-13 | End: 2025-06-13

## 2025-06-12 RX ORDER — ENOXAPARIN SODIUM 100 MG/ML
40 INJECTION SUBCUTANEOUS DAILY
Status: DISCONTINUED | OUTPATIENT
Start: 2025-06-12 | End: 2025-06-13 | Stop reason: HOSPADM

## 2025-06-12 RX ORDER — LISINOPRIL 5 MG/1
5 TABLET ORAL ONCE
Status: COMPLETED | OUTPATIENT
Start: 2025-06-12 | End: 2025-06-12

## 2025-06-12 RX ADMIN — ASPIRIN 81 MG: 81 TABLET, CHEWABLE ORAL at 05:33

## 2025-06-12 RX ADMIN — FUROSEMIDE 40 MG: 40 TABLET ORAL at 05:33

## 2025-06-12 RX ADMIN — LISINOPRIL 5 MG: 5 TABLET ORAL at 15:43

## 2025-06-12 RX ADMIN — TICAGRELOR 90 MG: 90 TABLET ORAL at 17:47

## 2025-06-12 RX ADMIN — CARVEDILOL 6.25 MG: 12.5 TABLET, FILM COATED ORAL at 07:41

## 2025-06-12 RX ADMIN — TICAGRELOR 90 MG: 90 TABLET ORAL at 05:33

## 2025-06-12 RX ADMIN — AMLODIPINE BESYLATE 5 MG: 5 TABLET ORAL at 05:33

## 2025-06-12 RX ADMIN — CARVEDILOL 6.25 MG: 12.5 TABLET, FILM COATED ORAL at 17:47

## 2025-06-12 RX ADMIN — LISINOPRIL 5 MG: 5 TABLET ORAL at 05:34

## 2025-06-12 RX ADMIN — DOCUSATE SODIUM 50 MG AND SENNOSIDES 8.6 MG 2 TABLET: 8.6; 5 TABLET, FILM COATED ORAL at 17:47

## 2025-06-12 ASSESSMENT — COGNITIVE AND FUNCTIONAL STATUS - GENERAL
SUGGESTED CMS G CODE MODIFIER DAILY ACTIVITY: CH
SUGGESTED CMS G CODE MODIFIER MOBILITY: CH
MOBILITY SCORE: 24
DAILY ACTIVITIY SCORE: 24

## 2025-06-12 ASSESSMENT — ENCOUNTER SYMPTOMS
SPEECH CHANGE: 0
PALPITATIONS: 0
CONSTIPATION: 0
NAUSEA: 0
DIZZINESS: 0
DOUBLE VISION: 0
PHOTOPHOBIA: 0
MYALGIAS: 0
NECK PAIN: 0
FEVER: 0
COUGH: 0
ABDOMINAL PAIN: 0
HEMOPTYSIS: 0
WEIGHT LOSS: 0
VOMITING: 0
CHILLS: 0
DIARRHEA: 0
CLAUDICATION: 0
WEAKNESS: 0
BLURRED VISION: 0
ORTHOPNEA: 0

## 2025-06-12 ASSESSMENT — FIBROSIS 4 INDEX: FIB4 SCORE: 1.65

## 2025-06-12 ASSESSMENT — PAIN DESCRIPTION - PAIN TYPE: TYPE: ACUTE PAIN

## 2025-06-12 NOTE — ASSESSMENT & PLAN NOTE
She has a history of being on multiple medications in the past though has been off them for over a year  Blood pressure in the emergency room was in the 240 systolic for which nicardipine drip was initiated  Multiple oral medications have been initiated and will titrate to systolic blood pressure less than 160 with goal outpatient eventually to be in the normal range

## 2025-06-12 NOTE — PROGRESS NOTES
Monitor Summary  Rhythm: Normal Sinus Rhythm  Rate: 72-78  Ectopy: None Noted  .18 / .08 / .42

## 2025-06-12 NOTE — ASSESSMENT & PLAN NOTE
Echo showed diastolic dysfunction with normal EF   encouraged the patient to lose weight    Coreg, lisinopril and amlodipine Lasix as needed

## 2025-06-12 NOTE — PROGRESS NOTES
Bedside report received from off going RN/tech: Rubina, assumed care of patient.     Fall Risk Score: LOW RISK  Fall risk interventions in place: Place yellow fall risk ID band on patient, Provide patient/family education based on risk assessment, and Educate patient/family to call staff for assistance when getting out of bed  Bed type: Regular (Baldo Score less than 17 interventions in place)  Patient on cardiac monitor: Yes  IVF/IV medications: Not Applicable   Oxygen: How many liters 2L  Bedside sitter: Not Applicable   Isolation: Not applicable

## 2025-06-12 NOTE — CARE PLAN
The patient is Stable - Low risk of patient condition declining or worsening    Shift Goals  Clinical Goals: sbp < 160, monitor vitals, nuero checks  Patient Goals: BP control  Family Goals: maria antonia    Progress made toward(s) clinical / shift goals:    Problem: Pain - Standard  Goal: Alleviation of pain or a reduction in pain to the patient’s comfort goal  Outcome: Progressing     Problem: Knowledge Deficit - Standard  Goal: Patient and family/care givers will demonstrate understanding of plan of care, disease process/condition, diagnostic tests and medications  Outcome: Progressing     Problem: Neuro Status  Goal: Neuro status will remain stable or improve  Outcome: Progressing       Patient is not progressing towards the following goals:

## 2025-06-12 NOTE — PROGRESS NOTES
4 Eyes Skin Assessment Completed by LOGAN Cespedes and BERLIN Navarro.    Skin assessment is primarily focused on high risk bony prominences. Pay special attention to skin beneath and around medical devices, high risk bony prominences, skin to skin areas and areas where the patient lacks sensation to feel pain and areas where the patient previously had breakdown.     Head (Occipital):  WDL   Ears (Under Medical Devices): WDL   Nose (Under Medical Devices): WDL   Mouth:  WDL   Neck: WDL   Breast/Chest:  WDL   Shoulder Blades:  WDL   Spine:   WDL   (R) Arm/Elbow/Hand: Bruising, Edema, and Incision   (L) Arm/Elbow/Hand: Bruising and Edema   Abdomen: WDL   Pannus/Groin:  WDL   Sacrum/Coccyx:   Red and Blanching   (R) Ischial Tuberosity (Sit Bones):  WDL   (L) Ischial Tuberosity (Sit Bones):  WDL   (R) Leg:  Bruising and Edema   (L) Leg:  Bruising and Edema   (R) Heel:  Red and Blanching   (R) Foot/Toe: WDL   (L) Heel: Red and Blanching   (L) Foot/Toe:  WDL       DEVICES IN USE:   Respiratory Devices:  Nasal cannula  Feeding Devices:  N/A   Lines & BP Monitoring Devices:  Peripheral IV    Orthopedic Devices:  N/A  Miscellaneous Devices:  Telemetry monitor    PROTOCOL INTERVENTIONS:   Standard/Trauma Bed:  Already in place  Nasal Cannula with Gray Foams:  Already in place    WOUND PHOTOS:   Completed and in EPIC     WOUND CONSULT:   N/A, no advanced wound care needs identified

## 2025-06-12 NOTE — DISCHARGE PLANNING
[facility] Transitional Care Coordination    No Physiatry consult per protocol.  Current documentation does not reflect ongoing acute therapy need in 2 of 3 disciplines meeting CMS criteria for IRF level care.  If there are interval changes, please reach out to Rehab [facility] [phone].  Please reach out if PMR consult requested for medical management.

## 2025-06-12 NOTE — PROGRESS NOTES
Hospital Medicine Daily Progress Note    Date of Service  6/12/2025    Chief Complaint  Adri Maldonado is a 49 y.o. female admitted 6/10/2025 with slurred speech    Hospital Course  49-year-old female with history of obesity, sleep apnea, diabetes and hypertension who presented 6/10 with slurred speech.  Suddenly patient had slurred speech and not able to talk, on admission patient was found to have elevated blood pressure 240, CTA for head and neck showed 5 mm fusiform or dissecting aneurysm of the V4 segment of the right vertebral artery.  Nicardipine drip was initiated and patient was admitted to [facility].  Interventional radiologist evaluated the patient and underwent fluid diverter placement while she was on Integrilin drip and later was switched to Brilinta and aspirin.  Amlodipine and lisinopril were initiated for blood pressure control.  Her A1c was 7.2    Interval Problem Update  -Evaluated examined the patient at bedside, denied any new symptoms, improving on her speech and no neurofocal deficit.  - Blood pressure still not controlled, increase lisinopril  - Echo showed diastolic dysfunction, continue Lasix at this time  - MRI was not ordered before, order MRI urgent  - Continue aspirin and Brilinta      I have discussed this patient's plan of care and discharge plan at IDT rounds today with Case Management, Nursing, Nursing leadership, and other members of the IDT team.    Consultants/Specialty  Neurology and neuro interventional radiologist    Code Status  Full Code    Disposition  The patient is not medically cleared for discharge to home or a post-acute facility.  Anticipate discharge to: home with close outpatient follow-up    I have placed the appropriate orders for post-discharge needs.    Review of Systems  Review of Systems   Constitutional:  Positive for malaise/fatigue. Negative for chills, fever and weight loss.   HENT:  Negative for ear pain, hearing loss and tinnitus.    Eyes:  Negative for  blurred vision, double vision and photophobia.   Respiratory:  Negative for cough and hemoptysis.    Cardiovascular:  Negative for chest pain, palpitations, orthopnea and claudication.   Gastrointestinal:  Negative for abdominal pain, constipation, diarrhea, nausea and vomiting.   Genitourinary:  Negative for dysuria, frequency and urgency.   Musculoskeletal:  Negative for myalgias and neck pain.   Skin:  Negative for rash.   Neurological:  Negative for dizziness, speech change and weakness.        Physical Exam  Temp:  [36.1 °C (97 °F)-36.5 °C (97.7 °F)] 36.5 °C (97.7 °F)  Pulse:  [65-92] 65  Resp:  [16-18] 16  BP: (118-170)/() 156/99  SpO2:  [93 %-98 %] 98 %    Physical Exam  Constitutional:       General: She is not in acute distress.     Appearance: She is obese. She is not ill-appearing.   HENT:      Head: Normocephalic and atraumatic.   Eyes:      General: No scleral icterus.  Cardiovascular:      Rate and Rhythm: Normal rate.      Heart sounds: No murmur heard.  Pulmonary:      Effort: No respiratory distress.      Breath sounds: No wheezing.   Abdominal:      General: There is no distension.      Palpations: Abdomen is soft.      Tenderness: There is no abdominal tenderness.   Musculoskeletal:         General: No deformity.      Right lower leg: No edema.      Left lower leg: No edema.   Skin:     Coloration: Skin is not jaundiced or pale.      Findings: No bruising.   Neurological:      General: No focal deficit present.      Mental Status: She is oriented to person, place, and time. Mental status is at baseline.         Fluids    Intake/Output Summary (Last 24 hours) at 6/12/2025 1541  Last data filed at 6/12/2025 0800  Gross per 24 hour   Intake 400.42 ml   Output 850 ml   Net -449.58 ml        Laboratory  Recent Labs     06/10/25  0838 06/11/25  0355   WBC 7.2 7.9   RBC 8.08* 7.55*   HEMOGLOBIN 21.3* 19.7*   HEMATOCRIT 67.2* 64.6*   MCV 83.2 85.6   MCH 26.4* 26.1*   MCHC 31.7* 30.5*   RDW 57.6*  59.3*   PLATELETCT 211 203   MPV 9.9 9.8     Recent Labs     06/10/25  0838 06/10/25  1607 06/11/25  0355 06/11/25  0955 06/11/25  1432   SODIUM 138   < > 135 128* 136   POTASSIUM 3.9  --  4.8  --   --    CHLORIDE 98  --  101  --   --    CO2 31  --  25  --   --    GLUCOSE 136*  --  152*  --   --    BUN 15  --  16  --   --    CREATININE 0.73  --  0.69  --   --    CALCIUM 9.3  --  8.6  --   --     < > = values in this interval not displayed.     Recent Labs     06/10/25  0935   APTT 30.4   INR 1.02         Recent Labs     06/10/25  0838   TRIGLYCERIDE 143   HDL 59   *       Imaging  EC-ECHOCARDIOGRAM COMPLETE W/O CONT   Final Result      IR-EMBOLIZE-NEURO-INTRACRANIAL   Final Result      Right vertebral pseudoaneurysm.   Successful endovascular repair using flow diverter.      PLAN:   Continue dual antiplatelet treatments for 6 months.   Follow-up neuro interventional clinic visit      DX-CHEST-PORTABLE (1 VIEW)   Final Result      Cardiomegaly.      CT-CTA NECK WITH & W/O-POST PROCESSING   Final Result      1.  Small 5 mm fusiform or dissecting aneurysm of the V4 segment of the right vertebral artery.      CT-CTA HEAD WITH & W/O-POST PROCESS   Final Result      1.  5 mm fusiform aneurysm arising from the mid V4 segment of the right vertebral artery.   2.  No other evidence of intracranial aneurysm formation or occlusive cerebrovascular disease.      3.  These findings were Voalted to ANGÉLICA GILL at 9: 11:00 AM 6/10/2025      CT-CEREBRAL PERFUSION ANALYSIS   Final Result      1. Cerebral blood flow less than 30% possibly representing completed infarct = 2 mL. Based on distribution of this finding, this is likely to represent artifact.      2. T Max more than 6 seconds possibly representing combination of completed infarct and ischemia = 18 mL. Based on the distribution of this finding, this is likely to represent artifact.      3. Mismatched volume possibly representing ischemic brain/penumbra= 16 mL     "  4.  Please note that this cerebral perfusion study and report is Quantitative and targets supratentorial (cerebral) perfusion for evaluation of large vessel territory acute ischemia/infarction. For example, lacunar infarcts, and brainstem/posterior fossa    ischemia/infarction are not evaluated on this study.  Data acquisition is subject to artifacts which can yield non-anatomically plausible perfusion maps which may be due to motion, bolus timing, signal to noise ratio, or other technical factors.    Perfusion map abnormalities which show non-anatomic distributions are likely artifact.   This study is not \"stand-alone\" and should only be utilized for diagnosis, management/treatment in correlation with CT, CTA, and/or MRI and clinical factors.         CT-HEAD W/O   Final Result      1.  Small calcification or granuloma adjacent to the outlet of the right side of the fourth ventricle.      2.  Small curvilinear focus of increased attenuation in the left perimedullary cistern which may represent a small calcification, enhancing venous structure, or less likely minimal subarachnoid hemorrhage.               MR-BRAIN-W/O    (Results Pending)        Assessment/Plan  * Slurred speech  Assessment & Plan  MRI is pending  Neurocheck every 4 hours  Aspirin and Brilinta with atorvastatin    Polycythemia- (present on admission)  Assessment & Plan  Likely related to chronic hypoxia  Check erythropoietin   Hematology referral    Chronic diastolic congestive heart failure (HCC)  Assessment & Plan  Echo showed diastolic dysfunction with normal EF   encouraged the patient to lose weight    Coreg, lisinopril and amlodipine Lasix as needed      Type 2 diabetes mellitus without complication, without long-term current use of insulin (HCC)- (present on admission)  Assessment & Plan  She has been off her medications for over a year  With hyperglycemia  Hemoglobin A1c is 7.2  Will prescribe metformin upon discharge with prompt follow-up " with outpatient primary care provider  Weight loss encouraged and consider Ozempic as outpatient    Aneurysm of right vertebral artery (HCC)- (present on admission)  Assessment & Plan  Noted on CTA in the setting of systolic blood pressure 240  Now status post flow diverter placement by interventional radiology on 6/10  Dual antiplatelet therapy aspirin and Brilinta for 6 months  Follow-up with neurointerventional radiologist as outpatient, order was placed by [facility]    Hypertensive emergency- (present on admission)  Assessment & Plan  She has a history of being on multiple medications in the past though has been off them for over a year  Blood pressure in the emergency room was in the 240 systolic for which nicardipine drip was initiated  Multiple oral medications have been initiated and will titrate to systolic blood pressure less than 160 with goal outpatient eventually to be in the normal range    Class 3 severe obesity due to excess calories without serious comorbidity with body mass index (BMI) of 40.0 to 44.9 in adult- (present on admission)  Assessment & Plan  BMI 43  CPAP at night         VTE prophylaxis:   SCDs/TEDs   enoxaparin ppx      I have performed a physical exam and reviewed and updated ROS and Plan today (6/12/2025). In review of yesterday's note (6/11/2025), there are no changes except as documented above.      Greater than 51 minutes spent prepping to see patient (e.g. review of tests) obtaining and/or reviewing separately obtained history. Performing a medically appropriate examination and/ evaluation.  Counseling and educating the patient/family/caregiver.  Ordering medications, tests, or procedures.  Referring and communicating with other health care professionals.  Documenting clinical information in EPIC.  Independently interpreting results and communicating results to patient/family/caregiver.  Care coordination

## 2025-06-12 NOTE — CARE PLAN
The patient is Watcher - Medium risk of patient condition declining or worsening    Shift Goals  Clinical Goals: monitor vs and labs, neuro checks, sbp < 160  Patient Goals: go home  Family Goals: updates    Progress made toward(s) clinical / shift goals:    Problem: Knowledge Deficit - Standard  Goal: Patient and family/care givers will demonstrate understanding of plan of care, disease process/condition, diagnostic tests and medications  Outcome: Progressing  Note: Discussed plan of care, pt able to actively participate in the learning process and demonstrates understanding by return demonstration or return explanation. Improved ability to communicate regarding their healthcare and current admission. Improved ability to identify barriers to learning and care.       Problem: Hemodynamics  Goal: Patient's hemodynamics, fluid balance and neurologic status will be stable or improve  Outcome: Progressing  Note: Vital signs stable, CMS intact, signs of bleeding assessed. I/O monitored. IV fluids not in use. Oral intake adequate. Peripheral pulses assessed and monitored. Capillary refill assessed. PRN blood pressure control meds given as needed.           Problem: Neuro Status  Goal: Neuro status will remain stable or improve  Outcome: Progressing  Note: Neuro status stable and monitored. Q4 hour neurologic assessments completed and documented: LOC and orientation stable, speech clarity stable, facial symmetry stable, pupils are equal, round, reactive to light, and accomodation @3mm. Muscle strength stable.

## 2025-06-12 NOTE — ASSESSMENT & PLAN NOTE
She has been off her medications for over a year  With hyperglycemia  Hemoglobin A1c is 7.2  Will prescribe metformin upon discharge with prompt follow-up with outpatient primary care provider  Weight loss encouraged and consider Ozempic as outpatient

## 2025-06-12 NOTE — PROGRESS NOTES
Bedside report received from ICU RN. Pt is currently A&Ox4, SR, breathing at a normal rate and rhythm, warm, dry, and skin color appropriate for ethnicity, and in no visible emotional or physical distress. Bed locked in lowest position, call light is within reach, fall prevention measures in place. Pt instructed to use call light. Pt is on a cardiac monitor, order verified, transmitter connected appropriately, and leads placed correctly, rhythm and rate assessed by RN, monitor staff updated of changes and parameters as necessary.  No further needs at this time. Assumed care of pt. Report given to assisting staff - CNA/CCT/ACT.

## 2025-06-12 NOTE — ASSESSMENT & PLAN NOTE
Noted on CTA in the setting of systolic blood pressure 240  Now status post flow diverter placement by interventional radiology on 6/10  Dual antiplatelet therapy aspirin and Brilinta for 6 months  Follow-up with neurointerventional radiologist as outpatient, order was placed by [facility]

## 2025-06-13 ENCOUNTER — PHARMACY VISIT (OUTPATIENT)
Dept: PHARMACY | Facility: MEDICAL CENTER | Age: 50
End: 2025-06-13
Payer: COMMERCIAL

## 2025-06-13 ENCOUNTER — APPOINTMENT (OUTPATIENT)
Dept: RADIOLOGY | Facility: MEDICAL CENTER | Age: 50
DRG: 271 | End: 2025-06-13
Attending: INTERNAL MEDICINE
Payer: COMMERCIAL

## 2025-06-13 VITALS
BODY MASS INDEX: 41.07 KG/M2 | OXYGEN SATURATION: 97 % | RESPIRATION RATE: 18 BRPM | HEIGHT: 65 IN | SYSTOLIC BLOOD PRESSURE: 163 MMHG | WEIGHT: 246.47 LBS | TEMPERATURE: 97.4 F | HEART RATE: 73 BPM | DIASTOLIC BLOOD PRESSURE: 106 MMHG

## 2025-06-13 LAB
ALBUMIN SERPL BCP-MCNC: 3.2 G/DL (ref 3.2–4.9)
ANION GAP SERPL CALC-SCNC: 9 MMOL/L (ref 7–16)
BASOPHILS # BLD AUTO: 1.5 % (ref 0–1.8)
BASOPHILS # BLD: 0.14 K/UL (ref 0–0.12)
BUN SERPL-MCNC: 25 MG/DL (ref 8–22)
CALCIUM ALBUM COR SERPL-MCNC: 10.1 MG/DL (ref 8.5–10.5)
CALCIUM SERPL-MCNC: 9.5 MG/DL (ref 8.5–10.5)
CHLORIDE SERPL-SCNC: 93 MMOL/L (ref 96–112)
CO2 SERPL-SCNC: 35 MMOL/L (ref 20–33)
CREAT SERPL-MCNC: 0.82 MG/DL (ref 0.5–1.4)
EOSINOPHIL # BLD AUTO: 0.12 K/UL (ref 0–0.51)
EOSINOPHIL NFR BLD: 1.3 % (ref 0–6.9)
EPO SERPL-ACNC: 4 MU/ML (ref 4–27)
ERYTHROCYTE [DISTWIDTH] IN BLOOD BY AUTOMATED COUNT: 59.6 FL (ref 35.9–50)
GFR SERPLBLD CREATININE-BSD FMLA CKD-EPI: 87 ML/MIN/1.73 M 2
GLUCOSE BLD STRIP.AUTO-MCNC: 129 MG/DL (ref 65–99)
GLUCOSE BLD STRIP.AUTO-MCNC: 181 MG/DL (ref 65–99)
GLUCOSE SERPL-MCNC: 116 MG/DL (ref 65–99)
HCT VFR BLD AUTO: 65.7 % (ref 37–47)
HGB BLD-MCNC: 20.2 G/DL (ref 12–16)
IMM GRANULOCYTES # BLD AUTO: 0.04 K/UL (ref 0–0.11)
IMM GRANULOCYTES NFR BLD AUTO: 0.4 % (ref 0–0.9)
LYMPHOCYTES # BLD AUTO: 1.91 K/UL (ref 1–4.8)
LYMPHOCYTES NFR BLD: 20 % (ref 22–41)
MAGNESIUM SERPL-MCNC: 1.7 MG/DL (ref 1.5–2.5)
MCH RBC QN AUTO: 25.9 PG (ref 27–33)
MCHC RBC AUTO-ENTMCNC: 30.7 G/DL (ref 32.2–35.5)
MCV RBC AUTO: 84.2 FL (ref 81.4–97.8)
MONOCYTES # BLD AUTO: 0.98 K/UL (ref 0–0.85)
MONOCYTES NFR BLD AUTO: 10.3 % (ref 0–13.4)
NEUTROPHILS # BLD AUTO: 6.34 K/UL (ref 1.82–7.42)
NEUTROPHILS NFR BLD: 66.5 % (ref 44–72)
NRBC # BLD AUTO: 0 K/UL
NRBC BLD-RTO: 0 /100 WBC (ref 0–0.2)
PHOSPHATE SERPL-MCNC: 4.2 MG/DL (ref 2.5–4.5)
PLATELET # BLD AUTO: 222 K/UL (ref 164–446)
PMV BLD AUTO: 9.7 FL (ref 9–12.9)
POTASSIUM SERPL-SCNC: 4.2 MMOL/L (ref 3.6–5.5)
RBC # BLD AUTO: 7.8 M/UL (ref 4.2–5.4)
SODIUM SERPL-SCNC: 137 MMOL/L (ref 135–145)
WBC # BLD AUTO: 9.5 K/UL (ref 4.8–10.8)

## 2025-06-13 PROCEDURE — 83735 ASSAY OF MAGNESIUM: CPT

## 2025-06-13 PROCEDURE — 700102 HCHG RX REV CODE 250 W/ 637 OVERRIDE(OP): Performed by: INTERNAL MEDICINE

## 2025-06-13 PROCEDURE — 81270 JAK2 GENE: CPT

## 2025-06-13 PROCEDURE — 85025 COMPLETE CBC W/AUTO DIFF WBC: CPT

## 2025-06-13 PROCEDURE — RXMED WILLOW AMBULATORY MEDICATION CHARGE: Performed by: INTERNAL MEDICINE

## 2025-06-13 PROCEDURE — 81338 MPL GENE COMMON VARIANTS: CPT

## 2025-06-13 PROCEDURE — A9270 NON-COVERED ITEM OR SERVICE: HCPCS | Performed by: PHYSICIAN ASSISTANT

## 2025-06-13 PROCEDURE — 81219 CALR GENE COM VARIANTS: CPT

## 2025-06-13 PROCEDURE — 82962 GLUCOSE BLOOD TEST: CPT | Performed by: INTERNAL MEDICINE

## 2025-06-13 PROCEDURE — 99239 HOSP IP/OBS DSCHRG MGMT >30: CPT | Performed by: INTERNAL MEDICINE

## 2025-06-13 PROCEDURE — 80069 RENAL FUNCTION PANEL: CPT

## 2025-06-13 PROCEDURE — A9270 NON-COVERED ITEM OR SERVICE: HCPCS | Performed by: INTERNAL MEDICINE

## 2025-06-13 PROCEDURE — 700102 HCHG RX REV CODE 250 W/ 637 OVERRIDE(OP): Performed by: EMERGENCY MEDICINE

## 2025-06-13 PROCEDURE — A9270 NON-COVERED ITEM OR SERVICE: HCPCS | Performed by: EMERGENCY MEDICINE

## 2025-06-13 PROCEDURE — 700102 HCHG RX REV CODE 250 W/ 637 OVERRIDE(OP): Performed by: PHYSICIAN ASSISTANT

## 2025-06-13 PROCEDURE — 70551 MRI BRAIN STEM W/O DYE: CPT

## 2025-06-13 PROCEDURE — 700111 HCHG RX REV CODE 636 W/ 250 OVERRIDE (IP): Mod: JZ | Performed by: INTERNAL MEDICINE

## 2025-06-13 RX ORDER — LISINOPRIL 20 MG/1
20 TABLET ORAL
Status: DISCONTINUED | OUTPATIENT
Start: 2025-06-14 | End: 2025-06-13 | Stop reason: HOSPADM

## 2025-06-13 RX ORDER — LISINOPRIL 20 MG/1
10 TABLET ORAL DAILY
Qty: 50 TABLET | Refills: 3 | Status: SHIPPED | OUTPATIENT
Start: 2025-06-14 | End: 2026-07-19

## 2025-06-13 RX ORDER — ATORVASTATIN CALCIUM 20 MG/1
20 TABLET, FILM COATED ORAL NIGHTLY
Qty: 100 TABLET | Refills: 3 | Status: SHIPPED | OUTPATIENT
Start: 2025-06-13 | End: 2026-07-18

## 2025-06-13 RX ORDER — CARVEDILOL 12.5 MG/1
12.5 TABLET ORAL 2 TIMES DAILY WITH MEALS
Status: DISCONTINUED | OUTPATIENT
Start: 2025-06-13 | End: 2025-06-13 | Stop reason: HOSPADM

## 2025-06-13 RX ORDER — LISINOPRIL 10 MG/1
10 TABLET ORAL ONCE
Status: COMPLETED | OUTPATIENT
Start: 2025-06-13 | End: 2025-06-13

## 2025-06-13 RX ORDER — TICAGRELOR 90 MG/1
90 TABLET, FILM COATED ORAL 2 TIMES DAILY
Qty: 180 TABLET | Refills: 3 | Status: SHIPPED | OUTPATIENT
Start: 2025-06-13

## 2025-06-13 RX ORDER — ASPIRIN 81 MG/1
81 TABLET, CHEWABLE ORAL DAILY
Qty: 90 TABLET | Refills: 3 | Status: SHIPPED | OUTPATIENT
Start: 2025-06-14

## 2025-06-13 RX ORDER — CARVEDILOL 6.25 MG/1
6.25 TABLET ORAL ONCE
Status: COMPLETED | OUTPATIENT
Start: 2025-06-13 | End: 2025-06-13

## 2025-06-13 RX ADMIN — INSULIN LISPRO 1 UNITS: 100 INJECTION, SOLUTION INTRAVENOUS; SUBCUTANEOUS at 11:40

## 2025-06-13 RX ADMIN — LISINOPRIL 10 MG: 10 TABLET ORAL at 05:26

## 2025-06-13 RX ADMIN — TICAGRELOR 90 MG: 90 TABLET ORAL at 05:26

## 2025-06-13 RX ADMIN — AMLODIPINE BESYLATE 5 MG: 5 TABLET ORAL at 05:26

## 2025-06-13 RX ADMIN — CARVEDILOL 6.25 MG: 6.25 TABLET, FILM COATED ORAL at 09:18

## 2025-06-13 RX ADMIN — LABETALOL HYDROCHLORIDE 10 MG: 5 INJECTION, SOLUTION INTRAVENOUS at 00:42

## 2025-06-13 RX ADMIN — FUROSEMIDE 40 MG: 40 TABLET ORAL at 05:26

## 2025-06-13 RX ADMIN — HYDRALAZINE HYDROCHLORIDE 10 MG: 20 INJECTION, SOLUTION INTRAMUSCULAR; INTRAVENOUS at 03:46

## 2025-06-13 RX ADMIN — CARBOXYMETHYLCELLULOSE SODIUM 1 DROP: 5 SOLUTION/ DROPS OPHTHALMIC at 00:44

## 2025-06-13 RX ADMIN — ASPIRIN 81 MG: 81 TABLET, CHEWABLE ORAL at 05:26

## 2025-06-13 RX ADMIN — CARVEDILOL 6.25 MG: 12.5 TABLET, FILM COATED ORAL at 07:37

## 2025-06-13 RX ADMIN — LISINOPRIL 10 MG: 10 TABLET ORAL at 09:18

## 2025-06-13 ASSESSMENT — PAIN DESCRIPTION - PAIN TYPE: TYPE: ACUTE PAIN

## 2025-06-13 ASSESSMENT — FIBROSIS 4 INDEX: FIB4 SCORE: 1.65

## 2025-06-13 NOTE — DISCHARGE SUMMARY
Discharge Summary    CHIEF COMPLAINT ON ADMISSION  Chief Complaint   Patient presents with    Blood Pressure Problem     Pt reports she called EMS this AM and they said she had high blood pressure. Pt reports history of same, denies taking medications.    Slurred Speech     Pt reports she woke up at 0630 and was stuttering with some slurred speech. LKW 0100. Pt reports symptoms have resolved. -stroke scale       Reason for Admission  Uncontrolled hypertension and slurred speech  Cerebral aneurysm    Admission Date  6/10/2025    CODE STATUS  Full Code    HPI & HOSPITAL COURSE    49-year-old female with history of obesity, sleep apnea, diabetes and hypertension who presented 6/10 with slurred speech. Suddenly patient had slurred speech and not able to talk, on admission patient was found to have elevated blood pressure 240, CTA for head and neck showed 5 mm fusiform no dissecting aneurysm of the V4 segment of the right vertebral artery. Nicardipine drip was initiated and patient was admitted to [facility]. Interventional radiologist evaluated the patient and underwent fluid diverter placement while she was on Integrilin drip and later was switched to Brilinta and aspirin. Amlodipine and lisinopril were initiated for blood pressure control. Her A1c was 7.2 her blood pressure was improved however still around 150 and 160, patient wanted to go home and she feels better and less anxiety, she states her blood pressure is controlled with medication at home.  Patient will be discharged with Coreg, amlodipine and lisinopril, encouraged her to check her blood sugar and blood pressure closely at home and follow-up with PCP to adjust the medication if needed.  Also patient will be discharged with metformin.    Encouraged the patient to continue taking Eliquis and presented for 6 weeks and follow-up with interventional radiologist as outpatient.    Polycythemia was found with hematocrit more than 20,  erythropoietin and JAK2 is  pending, encouraged the patient to follow-up with PCP, using CPAP and oxygen therapy at night and follow-up with hematologist and PCP as outpatient.    Her LDL was more than 160 and since patient has history of hypertension and diabetes, atorvastatin was initiated.    Patient has sleep apnea not using CPAP, encouraged the patient to follow-up with CPAP clinic, discussed the importance of CPAP and sleep apnea treatment, she understood and agreed.  Also discussed the importance of losing weight and following healthy diet.    On the day of discharge the patient was alert and oriented x 4, patient was on room air, had long discussion with the patient and  at bedside about the importance of taking her medications, controlling blood pressure to avoid intracranial bleeding also improving blood sugar, using CPAP and follow-up closely with PCP, interventional radiologist and hematologist as outpatient, patient and  understood and agreed.  Patient is on high risk for readmission due to comorbidities and noncompliance with uncontrolled hypertension and diabetes.    Therefore, she is discharged in good and stable condition to home with close outpatient follow-up.    The patient met 2-midnight criteria for an inpatient stay at the time of discharge.    Discharge Date  06/13/25      FOLLOW UP ITEMS POST DISCHARGE  Follow-up with PCP  Follow-up with interventional radiologist    DISCHARGE DIAGNOSES  Principal Problem:    Slurred speech (POA: Unknown)  Active Problems:    Class 3 severe obesity due to excess calories without serious comorbidity with body mass index (BMI) of 40.0 to 44.9 in adult (POA: Yes)    Hypertensive emergency (POA: Yes)    Aneurysm of right vertebral artery (HCC) (POA: Yes)    Type 2 diabetes mellitus without complication, without long-term current use of insulin (HCC) (POA: Yes)    Chronic diastolic congestive heart failure (HCC) (POA: Unknown)    Polycythemia (POA: Yes)  Resolved Problems:     Polycythemia vera (HCC) (POA: Yes)      FOLLOW UP  No future appointments.  Lorna Desir P.A.-C.  [address] [facility] [address]  [phone]    Follow up in 1 week(s)        MEDICATIONS ON DISCHARGE     Medication List        START taking these medications        Instructions   aspirin 81 MG Chew chewable tablet  Start taking on: June 14, 2025  Commonly known as: Asa   Chew 1 Tablet every day.  Dose: 81 mg     atorvastatin 20 MG Tabs  Commonly known as: Lipitor   Take 1 Tablet by mouth every evening.  Dose: 20 mg     lisinopril 20 MG Tabs  Start taking on: June 14, 2025  Commonly known as: Prinivil   Take 0.5 Tablets by mouth every day.  Dose: 10 mg     metFORMIN 500 MG Tabs  Commonly known as: Glucophage   Take 1 Tablet by mouth every day.  Dose: 500 mg     ticagrelor 90 MG Tabs tablet  Commonly known as: Brilinta   Take 1 Tablet by mouth 2 times a day.  Dose: 90 mg            CONTINUE taking these medications        Instructions   acetaminophen 500 MG Tabs  Commonly known as: Tylenol   Take 1,000 mg by mouth one time as needed for Mild Pain (headache). 1,000 mg = 2 tabs  Dose: 1,000 mg     amLODIPine 10 MG Tabs  Commonly known as: Norvasc   Take 10 mg by mouth every day.  Dose: 10 mg     carvedilol 25 MG Tabs  Commonly known as: Coreg   Take 25 mg by mouth 2 times a day with meals.  Dose: 25 mg     cetirizine 10 MG Tabs  Commonly known as: ZyrTEC   Take 10 mg by mouth 1 time a day as needed for Allergies.  Dose: 10 mg     furosemide 40 MG Tabs  Commonly known as: Lasix   Take 40 mg by mouth every day.  Dose: 40 mg     potassium chloride 20 MEQ Pack  Commonly known as: Klor-Con   Take 20 mEq by mouth every day.  Dose: 20 mEq              Allergies  Allergies[1]    DIET  Orders Placed This Encounter   Procedures    Diet Order Diet: Cardiac; Second Modifier: (optional): Consistent CHO (Diabetic)     Standing Status:   Standing     Number of Occurrences:   1     Diet::   Cardiac [6]     Second Modifier:  (optional):   Consistent CHO (Diabetic) [4]       ACTIVITY  As tolerated.  Weight bearing as tolerated    CONSULTATIONS  Interventional radiologist    PROCEDURES   Flow diverter placement by Dr. Juliocesar Lozano, on 6/10    LABORATORY  Lab Results   Component Value Date    SODIUM 137 06/13/2025    POTASSIUM 4.2 06/13/2025    CHLORIDE 93 (L) 06/13/2025    CO2 35 (H) 06/13/2025    GLUCOSE 116 (H) 06/13/2025    BUN 25 (H) 06/13/2025    CREATININE 0.82 06/13/2025    CREATININE 0.6 05/28/2006        Lab Results   Component Value Date    WBC 9.5 06/13/2025    HEMOGLOBIN 20.2 (H) 06/13/2025    HEMATOCRIT 65.7 (HH) 06/13/2025    PLATELETCT 222 06/13/2025        Total time of the discharge process exceeds 36 minutes.       [1] No Known Allergies

## 2025-06-13 NOTE — CARE PLAN
The patient is Stable - Low risk of patient condition declining or worsening    Shift Goals  Clinical Goals: monitor BP, maintain SBP < 160, saftey  Patient Goals: bp control, go home  Family Goals: get pt home    Progress made toward(s) clinical / shift goals:    Problem: Pain - Standard  Goal: Alleviation of pain or a reduction in pain to the patient’s comfort goal  Outcome: Progressing     Problem: Hemodynamics  Goal: Patient's hemodynamics, fluid balance and neurologic status will be stable or improve  Outcome: Progressing     Problem: Neuro Status  Goal: Neuro status will remain stable or improve  Outcome: Progressing       Patient is not progressing towards the following goals:

## 2025-06-13 NOTE — PROGRESS NOTES
Bedside report received from off going RN/tech: Fidelia assumed care of patient.     Fall Risk Score: LOW RISK  Fall risk interventions in place: Provide patient/family education based on risk assessment, Educate patient/family to call staff for assistance when getting out of bed, Place fall precaution signage outside patient door, and Place patient in room close to nursing station  Bed type: Regular (Baldo Score less than 17 interventions in place)  Patient on cardiac monitor: Yes  IVF/IV medications: Not Applicable   Oxygen: Room Air  Bedside sitter: Not Applicable   Isolation: Not applicable

## 2025-06-13 NOTE — PROGRESS NOTES
Critical lab called regarding hematocrit 65.7. Read back confirmed. MD notified.    Left message for patient to return call.

## 2025-06-13 NOTE — PROGRESS NOTES
Monitor Summary  Rhythm: Normal Sinus Rhythm and Sinus Tachycardia  Rate:   Ectopy: PVCs, PACs  .18 / .08 / .40

## 2025-06-13 NOTE — CARE PLAN
The patient is Watcher - Medium risk of patient condition declining or worsening    Shift Goals  Clinical Goals: SBP <160, nuero checks, monitor vs and labs  Patient Goals: bp control  Family Goals: updates    Progress made toward(s) clinical / shift goals:    Problem: Knowledge Deficit - Standard  Goal: Patient and family/care givers will demonstrate understanding of plan of care, disease process/condition, diagnostic tests and medications  Outcome: Progressing  Note: Discussed plan of care, pt able to actively participate in the learning process and demonstrates understanding by return demonstration or return explanation. Improved ability to communicate regarding their healthcare and current admission. Improved ability to identify barriers to learning and care.       Problem: Neuro Status  Goal: Neuro status will remain stable or improve  Outcome: Progressing  Note: Neuro status stable and monitored. Q4 hour neurologic assessments completed and documented: LOC and orientation stable, speech clarity stable, facial symmetry stable, pupils are equal, round, reactive to light, and accomodation @3mm. Muscle strength stable.

## 2025-06-13 NOTE — PROGRESS NOTES
Bedside report received from off going RN/tech: Rubina, assumed care of patient.     Fall Risk Score: LOW RISK  Fall risk interventions in place: Place yellow fall risk ID band on patient and Provide patient/family education based on risk assessment  Bed type: Regular (Baldo Score less than 17 interventions in place)  Patient on cardiac monitor: Yes  IVF/IV medications: Not Applicable   Oxygen: Room Air  Bedside sitter: Not Applicable   Isolation: Not applicable

## 2025-06-13 NOTE — PROGRESS NOTES
Monitor Summary:   Rhythm: SR  Rate: 65-87  Measurement: .17/.09/.42  Ectopy: r pac, r pvc

## 2025-06-13 NOTE — PROGRESS NOTES
Discharge orders received.  Patient arrived to the discharge lounge.  PIV removed by discharge RN. Meds to beds medications verified by discharge   Instructions given, medications reviewed and general discharge education provided to patient.  Follow up appointments discussed.  Patient verbalized understanding of dc instructions and prescriptions.  Patient signed discharge instructions.  Patient verbalized she had all belongings with her, Denied having any home medications locked in our inpatient pharmacy that  she needs back. Patient ambulated  from discharge lounge to private vehicle. Patient left via car with family to home in stable condition.

## 2025-06-19 LAB
JAK2 P.V617F BLD/T QL: NOT DETECTED
SPECIMEN SOURCE: NORMAL

## 2025-06-20 LAB
MPL P.W515 BLD/T QL: NOT DETECTED
SPECIMEN SOURCE: NORMAL

## 2025-06-23 LAB
GENE XXX MUT ANL BLD/T: NOT DETECTED
SPECIMEN SOURCE: NORMAL

## 2025-07-10 PROCEDURE — RXMED WILLOW AMBULATORY MEDICATION CHARGE: Performed by: INTERNAL MEDICINE

## 2025-07-11 ENCOUNTER — PHARMACY VISIT (OUTPATIENT)
Dept: PHARMACY | Facility: MEDICAL CENTER | Age: 50
End: 2025-07-11
Payer: COMMERCIAL
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
## 2025-07-11 NOTE — Clinical Note
REFERRAL APPROVAL NOTICE         Sent on July 11, 2025                   Adri Schmid Erica  [address] [place] [address]                   Dear Ms. Maldonado,    After a careful review of the medical information and benefit coverage, [facility] has processed your referral. See below for additional details.    If applicable, you must be actively enrolled with your insurance for coverage of the authorized service. If you have any questions regarding your coverage, please contact your insurance directly.    REFERRAL INFORMATION   Referral #:  [id number]  Referred-To Department    Referred-By Provider:  Pulmonary and Sleep Medicine    Silva Stark M.D.   Pulmonary/sleep [facility]      [address]  [place] [address]  [phone] [address], [address]  [place] [id number] [phone]    Referral Start Date:  07/09/2025  Referral End Date:   06/20/2026           SCHEDULING  If you do not already have an appointment, please call [phone] to make an appointment.   MORE INFORMATION  As a reminder, [facility] ownership has changed, meaning this location is now owned and operated by [facility]. As such, we want to clarify that our patients should expect to receive two separate bills for the services received at [facility] - one representing the [facility] facility fees as the owner of the establishment, and the other to represent the physician's services and subsequent fees. You can speak with your insurance carrier for a pricing estimate by calling the customer service number on the back of your card and ask about charges for a hospital outpatient visit.  If you do not already have a [employer] account, sign up at: [email or web address]  You can access your medical information, make appointments, see lab results, billing information, and  more.  If you have questions regarding this referral, please contact  the [facility] department at:             [phone]. Monday - Friday 7:30AM - 5:00PM.      Sincerely,  [facility]

## 2025-07-14 ENCOUNTER — HOSPITAL ENCOUNTER (OUTPATIENT)
Dept: RADIOLOGY | Facility: MEDICAL CENTER | Age: 50
End: 2025-07-14
Payer: COMMERCIAL

## 2025-07-14 DIAGNOSIS — I72.6 ANEURYSM OF RIGHT VERTEBRAL ARTERY (HCC): ICD-10-CM

## 2025-07-14 RX ORDER — TICAGRELOR 90 MG/1
90 TABLET, FILM COATED ORAL 2 TIMES DAILY
Qty: 60 TABLET | Refills: 2 | Status: SHIPPED | OUTPATIENT
Start: 2025-07-14 | End: 2025-10-12

## 2025-07-14 ASSESSMENT — ENCOUNTER SYMPTOMS
WEIGHT LOSS: 0
SHORTNESS OF BREATH: 1
FEVER: 0
CHILLS: 0
FOCAL WEAKNESS: 0
SENSORY CHANGE: 0
WEAKNESS: 0
DIAPHORESIS: 0
CONSTITUTIONAL NEGATIVE: 1
CARDIOVASCULAR NEGATIVE: 1
GASTROINTESTINAL NEGATIVE: 1
SPEECH CHANGE: 0

## 2025-07-14 ASSESSMENT — LIFESTYLE VARIABLES: SUBSTANCE_ABUSE: 0

## 2025-08-08 ENCOUNTER — PHARMACY VISIT (OUTPATIENT)
Dept: PHARMACY | Facility: MEDICAL CENTER | Age: 50
End: 2025-08-08
Payer: COMMERCIAL

## 2025-08-08 PROCEDURE — RXMED WILLOW AMBULATORY MEDICATION CHARGE: Performed by: INTERNAL MEDICINE

## 2025-08-11 ENCOUNTER — OFFICE VISIT (OUTPATIENT)
Dept: SLEEP MEDICINE | Facility: MEDICAL CENTER | Age: 50
End: 2025-08-11
Attending: NURSE PRACTITIONER
Payer: COMMERCIAL

## 2025-08-11 VITALS
WEIGHT: 242 LBS | DIASTOLIC BLOOD PRESSURE: 64 MMHG | HEIGHT: 65 IN | HEART RATE: 69 BPM | OXYGEN SATURATION: 93 % | RESPIRATION RATE: 16 BRPM | BODY MASS INDEX: 40.32 KG/M2 | SYSTOLIC BLOOD PRESSURE: 108 MMHG

## 2025-08-11 DIAGNOSIS — G47.34 NOCTURNAL HYPOXIA: ICD-10-CM

## 2025-08-11 DIAGNOSIS — G47.33 OSA (OBSTRUCTIVE SLEEP APNEA): Primary | ICD-10-CM

## 2025-08-11 DIAGNOSIS — J45.41 MODERATE PERSISTENT ASTHMA WITH ACUTE EXACERBATION: ICD-10-CM

## 2025-08-11 PROCEDURE — 99215 OFFICE O/P EST HI 40 MIN: CPT | Performed by: NURSE PRACTITIONER

## 2025-08-11 PROCEDURE — 3074F SYST BP LT 130 MM HG: CPT | Performed by: NURSE PRACTITIONER

## 2025-08-11 PROCEDURE — 3078F DIAST BP <80 MM HG: CPT | Performed by: NURSE PRACTITIONER

## 2025-08-11 PROCEDURE — 99204 OFFICE O/P NEW MOD 45 MIN: CPT | Performed by: NURSE PRACTITIONER

## 2025-08-11 ASSESSMENT — ENCOUNTER SYMPTOMS: SLEEP DISTURBANCE: 0

## 2025-08-11 ASSESSMENT — FIBROSIS 4 INDEX: FIB4 SCORE: 1.54

## 2025-09-22 ENCOUNTER — APPOINTMENT (OUTPATIENT)
Dept: SLEEP MEDICINE | Facility: MEDICAL CENTER | Age: 50
End: 2025-09-22
Attending: NURSE PRACTITIONER
Payer: COMMERCIAL